# Patient Record
Sex: MALE | Race: WHITE | NOT HISPANIC OR LATINO | Employment: FULL TIME | URBAN - METROPOLITAN AREA
[De-identification: names, ages, dates, MRNs, and addresses within clinical notes are randomized per-mention and may not be internally consistent; named-entity substitution may affect disease eponyms.]

---

## 2021-01-01 ENCOUNTER — VIRTUAL VISIT (OUTPATIENT)
Dept: ONCOLOGY | Facility: CLINIC | Age: 61
End: 2021-01-01
Payer: COMMERCIAL

## 2021-01-01 ENCOUNTER — PATIENT OUTREACH (OUTPATIENT)
Dept: ONCOLOGY | Facility: CLINIC | Age: 61
End: 2021-01-01
Payer: COMMERCIAL

## 2021-01-01 ENCOUNTER — PATIENT OUTREACH (OUTPATIENT)
Dept: ONCOLOGY | Facility: CLINIC | Age: 61
End: 2021-01-01

## 2021-01-01 ENCOUNTER — LAB (OUTPATIENT)
Dept: ONCOLOGY | Facility: CLINIC | Age: 61
End: 2021-01-01
Payer: COMMERCIAL

## 2021-01-01 ENCOUNTER — INFUSION THERAPY VISIT (OUTPATIENT)
Dept: ONCOLOGY | Facility: CLINIC | Age: 61
End: 2021-01-01
Attending: PHYSICIAN ASSISTANT
Payer: COMMERCIAL

## 2021-01-01 ENCOUNTER — HEALTH MAINTENANCE LETTER (OUTPATIENT)
Age: 61
End: 2021-01-01

## 2021-01-01 ENCOUNTER — INFUSION THERAPY VISIT (OUTPATIENT)
Dept: INFUSION THERAPY | Facility: CLINIC | Age: 61
End: 2021-01-01
Payer: COMMERCIAL

## 2021-01-01 ENCOUNTER — APPOINTMENT (OUTPATIENT)
Dept: GENERAL RADIOLOGY | Facility: CLINIC | Age: 61
DRG: 436 | End: 2021-01-01
Attending: PHYSICIAN ASSISTANT
Payer: COMMERCIAL

## 2021-01-01 ENCOUNTER — ONCOLOGY VISIT (OUTPATIENT)
Dept: ONCOLOGY | Facility: CLINIC | Age: 61
End: 2021-01-01
Attending: INTERNAL MEDICINE
Payer: COMMERCIAL

## 2021-01-01 ENCOUNTER — HOME INFUSION (PRE-WILLOW HOME INFUSION) (OUTPATIENT)
Dept: PHARMACY | Facility: CLINIC | Age: 61
End: 2021-01-01

## 2021-01-01 ENCOUNTER — RESEARCH ENCOUNTER (OUTPATIENT)
Dept: ONCOLOGY | Facility: CLINIC | Age: 61
End: 2021-01-01

## 2021-01-01 ENCOUNTER — INFUSION THERAPY VISIT (OUTPATIENT)
Dept: ONCOLOGY | Facility: CLINIC | Age: 61
End: 2021-01-01
Payer: COMMERCIAL

## 2021-01-01 ENCOUNTER — TELEPHONE (OUTPATIENT)
Dept: ONCOLOGY | Facility: CLINIC | Age: 61
End: 2021-01-01

## 2021-01-01 ENCOUNTER — DOCUMENTATION ONLY (OUTPATIENT)
Dept: PHARMACY | Facility: CLINIC | Age: 61
End: 2021-01-01

## 2021-01-01 ENCOUNTER — HOME INFUSION (PRE-WILLOW HOME INFUSION) (OUTPATIENT)
Dept: PHARMACY | Facility: CLINIC | Age: 61
End: 2021-01-01
Payer: COMMERCIAL

## 2021-01-01 ENCOUNTER — MYC REFILL (OUTPATIENT)
Dept: ONCOLOGY | Facility: CLINIC | Age: 61
End: 2021-01-01

## 2021-01-01 ENCOUNTER — ANCILLARY PROCEDURE (OUTPATIENT)
Dept: CT IMAGING | Facility: CLINIC | Age: 61
End: 2021-01-01
Attending: INTERNAL MEDICINE
Payer: COMMERCIAL

## 2021-01-01 ENCOUNTER — ANCILLARY PROCEDURE (OUTPATIENT)
Dept: GENERAL RADIOLOGY | Facility: CLINIC | Age: 61
End: 2021-01-01
Payer: COMMERCIAL

## 2021-01-01 ENCOUNTER — MYC MEDICAL ADVICE (OUTPATIENT)
Dept: ONCOLOGY | Facility: CLINIC | Age: 61
End: 2021-01-01
Payer: COMMERCIAL

## 2021-01-01 ENCOUNTER — DOCUMENTATION ONLY (OUTPATIENT)
Facility: CLINIC | Age: 61
End: 2021-01-01
Payer: COMMERCIAL

## 2021-01-01 ENCOUNTER — APPOINTMENT (OUTPATIENT)
Dept: LAB | Facility: CLINIC | Age: 61
End: 2021-01-01
Attending: INTERNAL MEDICINE
Payer: COMMERCIAL

## 2021-01-01 ENCOUNTER — NURSE TRIAGE (OUTPATIENT)
Dept: NURSING | Facility: CLINIC | Age: 61
End: 2021-01-01

## 2021-01-01 ENCOUNTER — DOCUMENTATION ONLY (OUTPATIENT)
Dept: ONCOLOGY | Facility: CLINIC | Age: 61
End: 2021-01-01

## 2021-01-01 ENCOUNTER — APPOINTMENT (OUTPATIENT)
Dept: LAB | Facility: CLINIC | Age: 61
End: 2021-01-01
Payer: COMMERCIAL

## 2021-01-01 ENCOUNTER — APPOINTMENT (OUTPATIENT)
Dept: LAB | Facility: CLINIC | Age: 61
End: 2021-01-01
Attending: PHYSICIAN ASSISTANT
Payer: COMMERCIAL

## 2021-01-01 ENCOUNTER — PRE VISIT (OUTPATIENT)
Dept: ONCOLOGY | Facility: CLINIC | Age: 61
End: 2021-01-01

## 2021-01-01 ENCOUNTER — MYC MEDICAL ADVICE (OUTPATIENT)
Dept: ONCOLOGY | Facility: CLINIC | Age: 61
End: 2021-01-01

## 2021-01-01 ENCOUNTER — HOSPITAL ENCOUNTER (INPATIENT)
Facility: CLINIC | Age: 61
LOS: 5 days | Discharge: HOME | DRG: 436 | End: 2021-12-04
Attending: STUDENT IN AN ORGANIZED HEALTH CARE EDUCATION/TRAINING PROGRAM | Admitting: STUDENT IN AN ORGANIZED HEALTH CARE EDUCATION/TRAINING PROGRAM
Payer: COMMERCIAL

## 2021-01-01 ENCOUNTER — LAB (OUTPATIENT)
Dept: LAB | Facility: CLINIC | Age: 61
End: 2021-01-01
Payer: COMMERCIAL

## 2021-01-01 ENCOUNTER — PATIENT OUTREACH (OUTPATIENT)
Dept: CARE COORDINATION | Facility: CLINIC | Age: 61
End: 2021-01-01

## 2021-01-01 ENCOUNTER — TRANSCRIBE ORDERS (OUTPATIENT)
Dept: OTHER | Age: 61
End: 2021-01-01

## 2021-01-01 ENCOUNTER — NURSE TRIAGE (OUTPATIENT)
Dept: NURSING | Facility: CLINIC | Age: 61
End: 2021-01-01
Payer: COMMERCIAL

## 2021-01-01 ENCOUNTER — APPOINTMENT (OUTPATIENT)
Dept: CT IMAGING | Facility: CLINIC | Age: 61
DRG: 436 | End: 2021-01-01
Attending: STUDENT IN AN ORGANIZED HEALTH CARE EDUCATION/TRAINING PROGRAM
Payer: COMMERCIAL

## 2021-01-01 ENCOUNTER — NURSE TRIAGE (OUTPATIENT)
Dept: ONCOLOGY | Facility: CLINIC | Age: 61
End: 2021-01-01

## 2021-01-01 ENCOUNTER — HOSPITAL ENCOUNTER (OUTPATIENT)
Facility: CLINIC | Age: 61
Discharge: HOME | End: 2021-06-04
Attending: RADIOLOGY | Admitting: RADIOLOGY
Payer: COMMERCIAL

## 2021-01-01 ENCOUNTER — APPOINTMENT (OUTPATIENT)
Dept: INTERVENTIONAL RADIOLOGY/VASCULAR | Facility: CLINIC | Age: 61
End: 2021-01-01
Attending: INTERNAL MEDICINE
Payer: COMMERCIAL

## 2021-01-01 ENCOUNTER — APPOINTMENT (OUTPATIENT)
Dept: GENERAL RADIOLOGY | Facility: CLINIC | Age: 61
End: 2021-01-01
Payer: COMMERCIAL

## 2021-01-01 ENCOUNTER — HOSPITAL ENCOUNTER (OUTPATIENT)
Dept: PHYSICAL THERAPY | Facility: CLINIC | Age: 61
Setting detail: THERAPIES SERIES
End: 2021-07-15
Attending: INTERNAL MEDICINE
Payer: COMMERCIAL

## 2021-01-01 ENCOUNTER — HOSPITAL ENCOUNTER (OUTPATIENT)
Facility: CLINIC | Age: 61
Discharge: HOME | End: 2021-06-14
Payer: COMMERCIAL

## 2021-01-01 ENCOUNTER — HOSPITAL ENCOUNTER (OUTPATIENT)
Facility: AMBULATORY SURGERY CENTER | Age: 61
End: 2021-01-01
Attending: RADIOLOGY
Payer: COMMERCIAL

## 2021-01-01 VITALS
WEIGHT: 187 LBS | OXYGEN SATURATION: 97 % | BODY MASS INDEX: 25.36 KG/M2 | SYSTOLIC BLOOD PRESSURE: 108 MMHG | RESPIRATION RATE: 18 BRPM | HEART RATE: 77 BPM | DIASTOLIC BLOOD PRESSURE: 70 MMHG | TEMPERATURE: 98.4 F

## 2021-01-01 VITALS
TEMPERATURE: 97.6 F | RESPIRATION RATE: 16 BRPM | SYSTOLIC BLOOD PRESSURE: 116 MMHG | DIASTOLIC BLOOD PRESSURE: 80 MMHG | HEART RATE: 57 BPM | OXYGEN SATURATION: 99 %

## 2021-01-01 VITALS
DIASTOLIC BLOOD PRESSURE: 74 MMHG | OXYGEN SATURATION: 96 % | TEMPERATURE: 98.2 F | WEIGHT: 195.8 LBS | RESPIRATION RATE: 16 BRPM | BODY MASS INDEX: 25.83 KG/M2 | SYSTOLIC BLOOD PRESSURE: 114 MMHG | HEART RATE: 65 BPM

## 2021-01-01 VITALS
SYSTOLIC BLOOD PRESSURE: 111 MMHG | WEIGHT: 199.5 LBS | HEART RATE: 60 BPM | OXYGEN SATURATION: 95 % | DIASTOLIC BLOOD PRESSURE: 74 MMHG | BODY MASS INDEX: 27.06 KG/M2 | TEMPERATURE: 97.4 F | RESPIRATION RATE: 17 BRPM

## 2021-01-01 VITALS
SYSTOLIC BLOOD PRESSURE: 109 MMHG | DIASTOLIC BLOOD PRESSURE: 72 MMHG | HEART RATE: 98 BPM | OXYGEN SATURATION: 95 % | TEMPERATURE: 97.8 F | WEIGHT: 179 LBS | RESPIRATION RATE: 16 BRPM | BODY MASS INDEX: 24.28 KG/M2

## 2021-01-01 VITALS
WEIGHT: 166.89 LBS | RESPIRATION RATE: 16 BRPM | BODY MASS INDEX: 22.63 KG/M2 | SYSTOLIC BLOOD PRESSURE: 101 MMHG | TEMPERATURE: 97.4 F | OXYGEN SATURATION: 94 % | DIASTOLIC BLOOD PRESSURE: 59 MMHG | HEART RATE: 93 BPM

## 2021-01-01 VITALS
TEMPERATURE: 97.4 F | RESPIRATION RATE: 16 BRPM | DIASTOLIC BLOOD PRESSURE: 67 MMHG | HEART RATE: 88 BPM | WEIGHT: 189 LBS | SYSTOLIC BLOOD PRESSURE: 102 MMHG | OXYGEN SATURATION: 96 % | BODY MASS INDEX: 25.63 KG/M2

## 2021-01-01 VITALS
TEMPERATURE: 97.5 F | WEIGHT: 190.7 LBS | RESPIRATION RATE: 18 BRPM | SYSTOLIC BLOOD PRESSURE: 111 MMHG | OXYGEN SATURATION: 98 % | HEART RATE: 78 BPM | BODY MASS INDEX: 25.86 KG/M2 | DIASTOLIC BLOOD PRESSURE: 68 MMHG

## 2021-01-01 VITALS
OXYGEN SATURATION: 95 % | RESPIRATION RATE: 18 BRPM | DIASTOLIC BLOOD PRESSURE: 78 MMHG | SYSTOLIC BLOOD PRESSURE: 125 MMHG | HEART RATE: 69 BPM | BODY MASS INDEX: 26.04 KG/M2 | TEMPERATURE: 98 F | WEIGHT: 197.4 LBS

## 2021-01-01 VITALS
DIASTOLIC BLOOD PRESSURE: 71 MMHG | TEMPERATURE: 98 F | OXYGEN SATURATION: 98 % | HEART RATE: 75 BPM | WEIGHT: 197.2 LBS | SYSTOLIC BLOOD PRESSURE: 115 MMHG | HEIGHT: 73 IN | RESPIRATION RATE: 16 BRPM | BODY MASS INDEX: 26.14 KG/M2

## 2021-01-01 VITALS
HEART RATE: 81 BPM | DIASTOLIC BLOOD PRESSURE: 68 MMHG | TEMPERATURE: 98.2 F | SYSTOLIC BLOOD PRESSURE: 102 MMHG | WEIGHT: 188.4 LBS | OXYGEN SATURATION: 97 % | BODY MASS INDEX: 25.55 KG/M2

## 2021-01-01 VITALS
DIASTOLIC BLOOD PRESSURE: 80 MMHG | TEMPERATURE: 98.2 F | SYSTOLIC BLOOD PRESSURE: 123 MMHG | WEIGHT: 189.1 LBS | BODY MASS INDEX: 24.95 KG/M2 | HEART RATE: 82 BPM | OXYGEN SATURATION: 97 %

## 2021-01-01 VITALS — DIASTOLIC BLOOD PRESSURE: 79 MMHG | OXYGEN SATURATION: 100 % | SYSTOLIC BLOOD PRESSURE: 121 MMHG | HEART RATE: 70 BPM

## 2021-01-01 VITALS
RESPIRATION RATE: 16 BRPM | TEMPERATURE: 98.1 F | SYSTOLIC BLOOD PRESSURE: 98 MMHG | OXYGEN SATURATION: 99 % | HEART RATE: 73 BPM | DIASTOLIC BLOOD PRESSURE: 65 MMHG

## 2021-01-01 VITALS
BODY MASS INDEX: 24.79 KG/M2 | OXYGEN SATURATION: 96 % | TEMPERATURE: 97.5 F | DIASTOLIC BLOOD PRESSURE: 74 MMHG | RESPIRATION RATE: 16 BRPM | HEIGHT: 72 IN | SYSTOLIC BLOOD PRESSURE: 110 MMHG | HEART RATE: 94 BPM | WEIGHT: 183 LBS

## 2021-01-01 VITALS
SYSTOLIC BLOOD PRESSURE: 106 MMHG | HEIGHT: 72 IN | HEART RATE: 70 BPM | OXYGEN SATURATION: 97 % | RESPIRATION RATE: 16 BRPM | BODY MASS INDEX: 25.73 KG/M2 | WEIGHT: 190 LBS | DIASTOLIC BLOOD PRESSURE: 74 MMHG | TEMPERATURE: 98 F

## 2021-01-01 VITALS
BODY MASS INDEX: 25.79 KG/M2 | HEART RATE: 71 BPM | OXYGEN SATURATION: 97 % | DIASTOLIC BLOOD PRESSURE: 77 MMHG | WEIGHT: 195.5 LBS | RESPIRATION RATE: 16 BRPM | SYSTOLIC BLOOD PRESSURE: 119 MMHG | TEMPERATURE: 98 F

## 2021-01-01 VITALS
SYSTOLIC BLOOD PRESSURE: 110 MMHG | WEIGHT: 187.6 LBS | HEART RATE: 74 BPM | DIASTOLIC BLOOD PRESSURE: 72 MMHG | RESPIRATION RATE: 12 BRPM | TEMPERATURE: 98 F | OXYGEN SATURATION: 98 % | BODY MASS INDEX: 25.44 KG/M2

## 2021-01-01 VITALS — BODY MASS INDEX: 27.09 KG/M2 | HEIGHT: 72 IN | WEIGHT: 200 LBS

## 2021-01-01 VITALS
TEMPERATURE: 97.7 F | BODY MASS INDEX: 26.1 KG/M2 | HEART RATE: 69 BPM | SYSTOLIC BLOOD PRESSURE: 131 MMHG | OXYGEN SATURATION: 99 % | DIASTOLIC BLOOD PRESSURE: 78 MMHG | WEIGHT: 197.8 LBS

## 2021-01-01 DIAGNOSIS — R11.2 NAUSEA AND VOMITING, INTRACTABILITY OF VOMITING NOT SPECIFIED, UNSPECIFIED VOMITING TYPE: ICD-10-CM

## 2021-01-01 DIAGNOSIS — C78.7 LIVER METASTASES: ICD-10-CM

## 2021-01-01 DIAGNOSIS — C25.2 MALIGNANT NEOPLASM OF TAIL OF PANCREAS (H): Primary | ICD-10-CM

## 2021-01-01 DIAGNOSIS — E83.52 HYPERCALCEMIA: ICD-10-CM

## 2021-01-01 DIAGNOSIS — T45.1X5A MOUTH SORE SECONDARY TO CHEMOTHERAPY: Primary | ICD-10-CM

## 2021-01-01 DIAGNOSIS — C79.9 METASTASIS FROM PANCREATIC CANCER (H): Primary | ICD-10-CM

## 2021-01-01 DIAGNOSIS — K13.79 MOUTH SORE SECONDARY TO CHEMOTHERAPY: Primary | ICD-10-CM

## 2021-01-01 DIAGNOSIS — C25.2 MALIGNANT NEOPLASM OF TAIL OF PANCREAS (H): ICD-10-CM

## 2021-01-01 DIAGNOSIS — T45.1X5A CHEMOTHERAPY-INDUCED NEUTROPENIA (H): ICD-10-CM

## 2021-01-01 DIAGNOSIS — C25.9 PANCREATIC CANCER METASTASIZED TO LIVER (H): Primary | ICD-10-CM

## 2021-01-01 DIAGNOSIS — C25.9 PRIMARY MALIGNANT NEOPLASM OF PANCREAS WITH METASTASIS TO OTHER SITE (H): ICD-10-CM

## 2021-01-01 DIAGNOSIS — C78.7 PANCREATIC CANCER METASTASIZED TO LIVER (H): Primary | ICD-10-CM

## 2021-01-01 DIAGNOSIS — Z11.59 ENCOUNTER FOR SCREENING FOR OTHER VIRAL DISEASES: ICD-10-CM

## 2021-01-01 DIAGNOSIS — R65.10 SIRS (SYSTEMIC INFLAMMATORY RESPONSE SYNDROME) (H): ICD-10-CM

## 2021-01-01 DIAGNOSIS — C78.7 PANCREATIC CANCER METASTASIZED TO LIVER (H): ICD-10-CM

## 2021-01-01 DIAGNOSIS — R19.7 DIARRHEA, UNSPECIFIED TYPE: ICD-10-CM

## 2021-01-01 DIAGNOSIS — C25.9 PANCREATIC CANCER (H): Primary | ICD-10-CM

## 2021-01-01 DIAGNOSIS — C78.7 LIVER METASTASES: Primary | ICD-10-CM

## 2021-01-01 DIAGNOSIS — Z20.822 ENCOUNTER FOR LABORATORY TESTING FOR COVID-19 VIRUS: ICD-10-CM

## 2021-01-01 DIAGNOSIS — R05.9 COUGH: Primary | ICD-10-CM

## 2021-01-01 DIAGNOSIS — T45.1X5A CHEMOTHERAPY-INDUCED NEUTROPENIA (H): Primary | ICD-10-CM

## 2021-01-01 DIAGNOSIS — D64.9 SYMPTOMATIC ANEMIA: ICD-10-CM

## 2021-01-01 DIAGNOSIS — D64.9 SYMPTOMATIC ANEMIA: Primary | ICD-10-CM

## 2021-01-01 DIAGNOSIS — D70.1 CHEMOTHERAPY-INDUCED NEUTROPENIA (H): Primary | ICD-10-CM

## 2021-01-01 DIAGNOSIS — D70.1 CHEMOTHERAPY-INDUCED NEUTROPENIA (H): ICD-10-CM

## 2021-01-01 DIAGNOSIS — K92.0 HEMATEMESIS WITH NAUSEA: ICD-10-CM

## 2021-01-01 DIAGNOSIS — K92.2 ACUTE GI BLEEDING: ICD-10-CM

## 2021-01-01 DIAGNOSIS — C25.9 PANCREATIC CANCER METASTASIZED TO LIVER (H): ICD-10-CM

## 2021-01-01 DIAGNOSIS — C25.9 METASTASIS FROM PANCREATIC CANCER (H): Primary | ICD-10-CM

## 2021-01-01 DIAGNOSIS — C25.9 PRIMARY MALIGNANT NEOPLASM OF PANCREAS WITH METASTASIS TO OTHER SITE (H): Primary | ICD-10-CM

## 2021-01-01 DIAGNOSIS — Z20.822 LAB TEST NEGATIVE FOR COVID-19 VIRUS: ICD-10-CM

## 2021-01-01 DIAGNOSIS — G47.09 OTHER INSOMNIA: ICD-10-CM

## 2021-01-01 DIAGNOSIS — K92.1 MELENA: ICD-10-CM

## 2021-01-01 LAB
ABO/RH(D): NORMAL
ABO/RH(D): NORMAL
ALBUMIN SERPL-MCNC: 2 G/DL (ref 3.4–5)
ALBUMIN SERPL-MCNC: 2 G/DL (ref 3.4–5)
ALBUMIN SERPL-MCNC: 2.2 G/DL (ref 3.4–5)
ALBUMIN SERPL-MCNC: 2.8 G/DL (ref 3.4–5)
ALBUMIN SERPL-MCNC: 2.8 G/DL (ref 3.4–5)
ALBUMIN SERPL-MCNC: 3 G/DL (ref 3.4–5)
ALBUMIN SERPL-MCNC: 3.1 G/DL (ref 3.4–5)
ALBUMIN SERPL-MCNC: 3.2 G/DL (ref 3.4–5)
ALBUMIN SERPL-MCNC: 3.3 G/DL (ref 3.4–5)
ALBUMIN SERPL-MCNC: 3.4 G/DL (ref 3.4–5)
ALBUMIN UR-MCNC: NEGATIVE MG/DL
ALBUMIN UR-MCNC: NEGATIVE MG/DL
ALP SERPL-CCNC: 100 U/L (ref 40–150)
ALP SERPL-CCNC: 104 U/L (ref 40–150)
ALP SERPL-CCNC: 122 U/L (ref 40–150)
ALP SERPL-CCNC: 132 U/L (ref 40–150)
ALP SERPL-CCNC: 277 U/L (ref 40–150)
ALP SERPL-CCNC: 71 U/L (ref 40–150)
ALP SERPL-CCNC: 80 U/L (ref 40–150)
ALP SERPL-CCNC: 85 U/L (ref 40–150)
ALP SERPL-CCNC: 90 U/L (ref 40–150)
ALP SERPL-CCNC: 92 U/L (ref 40–150)
ALP SERPL-CCNC: 93 U/L (ref 40–150)
ALP SERPL-CCNC: 95 U/L (ref 40–150)
ALT SERPL W P-5'-P-CCNC: 14 U/L (ref 0–70)
ALT SERPL W P-5'-P-CCNC: 18 U/L (ref 0–70)
ALT SERPL W P-5'-P-CCNC: 23 U/L (ref 0–70)
ALT SERPL W P-5'-P-CCNC: 26 U/L (ref 0–70)
ALT SERPL W P-5'-P-CCNC: 26 U/L (ref 0–70)
ALT SERPL W P-5'-P-CCNC: 27 U/L (ref 0–70)
ALT SERPL W P-5'-P-CCNC: 28 U/L (ref 0–70)
ALT SERPL W P-5'-P-CCNC: 29 U/L (ref 0–70)
ALT SERPL W P-5'-P-CCNC: 31 U/L (ref 0–70)
ALT SERPL W P-5'-P-CCNC: 31 U/L (ref 0–70)
ALT SERPL W P-5'-P-CCNC: 32 U/L (ref 0–70)
ALT SERPL W P-5'-P-CCNC: 33 U/L (ref 0–70)
ALT SERPL W P-5'-P-CCNC: 33 U/L (ref 0–70)
AMORPH CRY #/AREA URNS HPF: ABNORMAL /HPF
ANION GAP SERPL CALCULATED.3IONS-SCNC: 2 MMOL/L (ref 3–14)
ANION GAP SERPL CALCULATED.3IONS-SCNC: 3 MMOL/L (ref 3–14)
ANION GAP SERPL CALCULATED.3IONS-SCNC: 4 MMOL/L (ref 3–14)
ANION GAP SERPL CALCULATED.3IONS-SCNC: 5 MMOL/L (ref 3–14)
ANION GAP SERPL CALCULATED.3IONS-SCNC: 6 MMOL/L (ref 3–14)
ANION GAP SERPL CALCULATED.3IONS-SCNC: 6 MMOL/L (ref 3–14)
ANION GAP SERPL CALCULATED.3IONS-SCNC: 7 MMOL/L (ref 3–14)
ANION GAP SERPL CALCULATED.3IONS-SCNC: 8 MMOL/L (ref 3–14)
ANTIBODY SCREEN: NEGATIVE
ANTIBODY SCREEN: NEGATIVE
APPEARANCE UR: CLEAR
APPEARANCE UR: CLEAR
APTT PPP: 24 SECONDS (ref 22–38)
AST SERPL W P-5'-P-CCNC: 17 U/L (ref 0–45)
AST SERPL W P-5'-P-CCNC: 19 U/L (ref 0–45)
AST SERPL W P-5'-P-CCNC: 21 U/L (ref 0–45)
AST SERPL W P-5'-P-CCNC: 23 U/L (ref 0–45)
AST SERPL W P-5'-P-CCNC: 24 U/L (ref 0–45)
AST SERPL W P-5'-P-CCNC: 24 U/L (ref 0–45)
AST SERPL W P-5'-P-CCNC: 25 U/L (ref 0–45)
AST SERPL W P-5'-P-CCNC: 26 U/L (ref 0–45)
AST SERPL W P-5'-P-CCNC: 26 U/L (ref 0–45)
AST SERPL W P-5'-P-CCNC: 27 U/L (ref 0–45)
AST SERPL W P-5'-P-CCNC: 27 U/L (ref 0–45)
AST SERPL W P-5'-P-CCNC: 29 U/L (ref 0–45)
AST SERPL W P-5'-P-CCNC: 37 U/L (ref 0–45)
ATRIAL RATE - MUSE: 83 BPM
BACTERIA BLD CULT: NO GROWTH
BASOPHILS # BLD AUTO: 0 10E3/UL (ref 0–0.2)
BASOPHILS # BLD AUTO: 0 10E9/L (ref 0–0.2)
BASOPHILS # BLD AUTO: 0.1 10E3/UL (ref 0–0.2)
BASOPHILS # BLD MANUAL: 0 10E3/UL (ref 0–0.2)
BASOPHILS NFR BLD AUTO: 0 %
BASOPHILS NFR BLD AUTO: 0.3 %
BASOPHILS NFR BLD AUTO: 0.3 %
BASOPHILS NFR BLD AUTO: 0.7 %
BASOPHILS NFR BLD AUTO: 1 %
BASOPHILS NFR BLD MANUAL: 0 %
BILIRUB SERPL-MCNC: 0.2 MG/DL (ref 0.2–1.3)
BILIRUB SERPL-MCNC: 0.3 MG/DL (ref 0.2–1.3)
BILIRUB SERPL-MCNC: 0.3 MG/DL (ref 0.2–1.3)
BILIRUB SERPL-MCNC: 0.4 MG/DL (ref 0.2–1.3)
BILIRUB SERPL-MCNC: 0.5 MG/DL (ref 0.2–1.3)
BILIRUB SERPL-MCNC: 0.5 MG/DL (ref 0.2–1.3)
BILIRUB SERPL-MCNC: 0.6 MG/DL (ref 0.2–1.3)
BILIRUB SERPL-MCNC: 0.6 MG/DL (ref 0.2–1.3)
BILIRUB SERPL-MCNC: 0.7 MG/DL (ref 0.2–1.3)
BILIRUB SERPL-MCNC: 0.7 MG/DL (ref 0.2–1.3)
BILIRUB UR QL STRIP: NEGATIVE
BILIRUB UR QL STRIP: NEGATIVE
BKR LAB AP TR RXN INTERPRETATION: NORMAL
BKR LAB AP TR RXN INTERPRETATION: NORMAL
BKR LAB AP TRAN RXN RECOMMENDATION: NORMAL
BKR LAB AP TRAN RXN RECOMMENDATION: NORMAL
BKR LAB AP TRANS SIGNS AND SX: NORMAL
BKR LAB AP TRANS SIGNS AND SX: NORMAL
BKR LAB AP TRANSFUSION REACTION: NORMAL
BKR LAB AP TRANSFUSION REACTION: NORMAL
BLD PROD TYP BPU: NORMAL
BLOOD COMPONENT TYPE: NORMAL
BUN SERPL-MCNC: 11 MG/DL (ref 7–30)
BUN SERPL-MCNC: 12 MG/DL (ref 7–30)
BUN SERPL-MCNC: 14 MG/DL (ref 7–30)
BUN SERPL-MCNC: 15 MG/DL (ref 7–30)
BUN SERPL-MCNC: 15 MG/DL (ref 7–30)
BUN SERPL-MCNC: 17 MG/DL (ref 7–30)
BUN SERPL-MCNC: 17 MG/DL (ref 7–30)
BUN SERPL-MCNC: 18 MG/DL (ref 7–30)
BUN SERPL-MCNC: 19 MG/DL (ref 7–30)
BUN SERPL-MCNC: 20 MG/DL (ref 7–30)
BUN SERPL-MCNC: 21 MG/DL (ref 7–30)
BUN SERPL-MCNC: 23 MG/DL (ref 7–30)
BUN SERPL-MCNC: 30 MG/DL (ref 7–30)
CALCIUM SERPL-MCNC: 10.4 MG/DL (ref 8.5–10.1)
CALCIUM SERPL-MCNC: 10.6 MG/DL (ref 8.5–10.1)
CALCIUM SERPL-MCNC: 11 MG/DL (ref 8.5–10.1)
CALCIUM SERPL-MCNC: 11 MG/DL (ref 8.5–10.1)
CALCIUM SERPL-MCNC: 11.4 MG/DL (ref 8.5–10.1)
CALCIUM SERPL-MCNC: 11.6 MG/DL (ref 8.5–10.1)
CALCIUM SERPL-MCNC: 12.7 MG/DL (ref 8.5–10.1)
CALCIUM SERPL-MCNC: 8.1 MG/DL (ref 8.5–10.1)
CALCIUM SERPL-MCNC: 8.3 MG/DL (ref 8.5–10.1)
CALCIUM SERPL-MCNC: 8.6 MG/DL (ref 8.5–10.1)
CALCIUM SERPL-MCNC: 8.7 MG/DL (ref 8.5–10.1)
CALCIUM SERPL-MCNC: 8.8 MG/DL (ref 8.5–10.1)
CALCIUM SERPL-MCNC: 8.9 MG/DL (ref 8.5–10.1)
CALCIUM SERPL-MCNC: 9.1 MG/DL (ref 8.5–10.1)
CANCER AG19-9 SERPL IA-ACNC: 240 U/ML
CANCER AG19-9 SERPL IA-ACNC: 242 U/ML
CANCER AG19-9 SERPL IA-ACNC: 252 U/ML
CANCER AG19-9 SERPL IA-ACNC: 283 U/ML
CANCER AG19-9 SERPL IA-ACNC: 457 U/ML
CANCER AG19-9 SERPL IA-ACNC: 569 U/ML
CANCER AG19-9 SERPL-ACNC: 1153 U/ML (ref 0–37)
CANCER AG19-9 SERPL-ACNC: 1186 U/ML (ref 0–37)
CANCER AG19-9 SERPL-ACNC: 661 U/ML (ref 0–37)
CHLORIDE BLD-SCNC: 100 MMOL/L (ref 94–109)
CHLORIDE BLD-SCNC: 101 MMOL/L (ref 94–109)
CHLORIDE BLD-SCNC: 102 MMOL/L (ref 94–109)
CHLORIDE BLD-SCNC: 103 MMOL/L (ref 94–109)
CHLORIDE BLD-SCNC: 105 MMOL/L (ref 94–109)
CHLORIDE BLD-SCNC: 105 MMOL/L (ref 94–109)
CHLORIDE BLD-SCNC: 108 MMOL/L (ref 94–109)
CHLORIDE BLD-SCNC: 109 MMOL/L (ref 94–109)
CHLORIDE BLD-SCNC: 109 MMOL/L (ref 94–109)
CHLORIDE BLD-SCNC: 110 MMOL/L (ref 94–109)
CHLORIDE BLD-SCNC: 99 MMOL/L (ref 94–109)
CHLORIDE SERPL-SCNC: 103 MMOL/L (ref 94–109)
CHLORIDE SERPL-SCNC: 106 MMOL/L (ref 94–109)
CHLORIDE SERPL-SCNC: 109 MMOL/L (ref 94–109)
CO COMPONENTS: NORMAL
CO COMPONENTS: NORMAL
CO2 SERPL-SCNC: 27 MMOL/L (ref 20–32)
CO2 SERPL-SCNC: 27 MMOL/L (ref 20–32)
CO2 SERPL-SCNC: 28 MMOL/L (ref 20–32)
CO2 SERPL-SCNC: 29 MMOL/L (ref 20–32)
CO2 SERPL-SCNC: 30 MMOL/L (ref 20–32)
CODING SYSTEM: NORMAL
COLOR UR AUTO: YELLOW
COLOR UR AUTO: YELLOW
COMMENTS: NORMAL
COMMENTS: NORMAL
COPATH REPORT: NORMAL
CREAT BLD-MCNC: 0.8 MG/DL (ref 0.66–1.25)
CREAT SERPL-MCNC: 0.69 MG/DL (ref 0.66–1.25)
CREAT SERPL-MCNC: 0.69 MG/DL (ref 0.66–1.25)
CREAT SERPL-MCNC: 0.72 MG/DL (ref 0.66–1.25)
CREAT SERPL-MCNC: 0.75 MG/DL (ref 0.66–1.25)
CREAT SERPL-MCNC: 0.78 MG/DL (ref 0.66–1.25)
CREAT SERPL-MCNC: 0.79 MG/DL (ref 0.66–1.25)
CREAT SERPL-MCNC: 0.8 MG/DL (ref 0.66–1.25)
CREAT SERPL-MCNC: 0.8 MG/DL (ref 0.66–1.25)
CREAT SERPL-MCNC: 0.82 MG/DL (ref 0.66–1.25)
CREAT SERPL-MCNC: 0.84 MG/DL (ref 0.66–1.25)
CREAT SERPL-MCNC: 0.84 MG/DL (ref 0.66–1.25)
CREAT SERPL-MCNC: 0.85 MG/DL (ref 0.66–1.25)
CREAT SERPL-MCNC: 0.88 MG/DL (ref 0.66–1.25)
CREAT SERPL-MCNC: 0.9 MG/DL (ref 0.66–1.25)
CREAT SERPL-MCNC: 0.91 MG/DL (ref 0.66–1.25)
CREAT SERPL-MCNC: 0.92 MG/DL (ref 0.66–1.25)
CROSSMATCH: NORMAL
DIASTOLIC BLOOD PRESSURE - MUSE: NORMAL MMHG
DIFFERENTIAL METHOD BLD: ABNORMAL
ELLIPTOCYTES BLD QL SMEAR: SLIGHT
EOSINOPHIL # BLD AUTO: 0 10E3/UL (ref 0–0.7)
EOSINOPHIL # BLD AUTO: 0 10E9/L (ref 0–0.7)
EOSINOPHIL # BLD AUTO: 0.1 10E3/UL (ref 0–0.7)
EOSINOPHIL # BLD AUTO: 0.1 10E9/L (ref 0–0.7)
EOSINOPHIL # BLD AUTO: 0.1 10E9/L (ref 0–0.7)
EOSINOPHIL # BLD AUTO: 0.2 10E3/UL (ref 0–0.7)
EOSINOPHIL # BLD MANUAL: 0 10E3/UL (ref 0–0.7)
EOSINOPHIL NFR BLD AUTO: 0 %
EOSINOPHIL NFR BLD AUTO: 1 %
EOSINOPHIL NFR BLD AUTO: 1.4 %
EOSINOPHIL NFR BLD AUTO: 1.8 %
EOSINOPHIL NFR BLD AUTO: 2 %
EOSINOPHIL NFR BLD AUTO: 2 %
EOSINOPHIL NFR BLD AUTO: 3 %
EOSINOPHIL NFR BLD MANUAL: 0 %
ERYTHROCYTE [DISTWIDTH] IN BLOOD BY AUTOMATED COUNT: 12.5 % (ref 10–15)
ERYTHROCYTE [DISTWIDTH] IN BLOOD BY AUTOMATED COUNT: 12.6 % (ref 10–15)
ERYTHROCYTE [DISTWIDTH] IN BLOOD BY AUTOMATED COUNT: 12.7 % (ref 10–15)
ERYTHROCYTE [DISTWIDTH] IN BLOOD BY AUTOMATED COUNT: 13.4 % (ref 10–15)
ERYTHROCYTE [DISTWIDTH] IN BLOOD BY AUTOMATED COUNT: 14.2 % (ref 10–15)
ERYTHROCYTE [DISTWIDTH] IN BLOOD BY AUTOMATED COUNT: 15.2 % (ref 10–15)
ERYTHROCYTE [DISTWIDTH] IN BLOOD BY AUTOMATED COUNT: 15.6 % (ref 10–15)
ERYTHROCYTE [DISTWIDTH] IN BLOOD BY AUTOMATED COUNT: 15.7 % (ref 10–15)
ERYTHROCYTE [DISTWIDTH] IN BLOOD BY AUTOMATED COUNT: 15.8 % (ref 10–15)
ERYTHROCYTE [DISTWIDTH] IN BLOOD BY AUTOMATED COUNT: 15.9 % (ref 10–15)
ERYTHROCYTE [DISTWIDTH] IN BLOOD BY AUTOMATED COUNT: 16.1 % (ref 10–15)
ERYTHROCYTE [DISTWIDTH] IN BLOOD BY AUTOMATED COUNT: 16.2 % (ref 10–15)
ERYTHROCYTE [DISTWIDTH] IN BLOOD BY AUTOMATED COUNT: 16.4 % (ref 10–15)
ERYTHROCYTE [DISTWIDTH] IN BLOOD BY AUTOMATED COUNT: 16.8 % (ref 10–15)
FLUAV RNA SPEC QL NAA+PROBE: NEGATIVE
FLUBV RNA RESP QL NAA+PROBE: NEGATIVE
GFR SERPL CREATININE-BSD FRML MDRD: 90 ML/MIN/1.73M2
GFR SERPL CREATININE-BSD FRML MDRD: >90 ML/MIN/1.73M2
GFR SERPL CREATININE-BSD FRML MDRD: >90 ML/MIN/{1.73_M2}
GLUCOSE BLD-MCNC: 106 MG/DL (ref 70–99)
GLUCOSE BLD-MCNC: 108 MG/DL (ref 70–99)
GLUCOSE BLD-MCNC: 108 MG/DL (ref 70–99)
GLUCOSE BLD-MCNC: 109 MG/DL (ref 70–99)
GLUCOSE BLD-MCNC: 111 MG/DL (ref 70–99)
GLUCOSE BLD-MCNC: 118 MG/DL (ref 70–99)
GLUCOSE BLD-MCNC: 134 MG/DL (ref 70–99)
GLUCOSE BLD-MCNC: 169 MG/DL (ref 70–99)
GLUCOSE BLD-MCNC: 88 MG/DL (ref 70–99)
GLUCOSE BLD-MCNC: 88 MG/DL (ref 70–99)
GLUCOSE BLD-MCNC: 91 MG/DL (ref 70–99)
GLUCOSE BLD-MCNC: 93 MG/DL (ref 70–99)
GLUCOSE BLD-MCNC: 96 MG/DL (ref 70–99)
GLUCOSE SERPL-MCNC: 106 MG/DL (ref 70–99)
GLUCOSE SERPL-MCNC: 114 MG/DL (ref 70–99)
GLUCOSE SERPL-MCNC: 86 MG/DL (ref 70–99)
GLUCOSE UR STRIP-MCNC: NEGATIVE MG/DL
GLUCOSE UR STRIP-MCNC: NEGATIVE MG/DL
GRAM STAIN RESULT: NORMAL
GRAM STAIN RESULT: NORMAL
GRAM/CULTURE INDICATED?: YES
GRAM/CULTURE INDICATED?: YES
HCT VFR BLD AUTO: 20.2 % (ref 40–53)
HCT VFR BLD AUTO: 22.2 % (ref 40–53)
HCT VFR BLD AUTO: 22.4 % (ref 40–53)
HCT VFR BLD AUTO: 23.8 % (ref 40–53)
HCT VFR BLD AUTO: 24 % (ref 40–53)
HCT VFR BLD AUTO: 24.7 % (ref 40–53)
HCT VFR BLD AUTO: 24.7 % (ref 40–53)
HCT VFR BLD AUTO: 25.7 % (ref 40–53)
HCT VFR BLD AUTO: 25.7 % (ref 40–53)
HCT VFR BLD AUTO: 28.6 % (ref 40–53)
HCT VFR BLD AUTO: 30.8 % (ref 40–53)
HCT VFR BLD AUTO: 31.1 % (ref 40–53)
HCT VFR BLD AUTO: 32.8 % (ref 40–53)
HCT VFR BLD AUTO: 33.2 % (ref 40–53)
HCT VFR BLD AUTO: 33.8 % (ref 40–53)
HCT VFR BLD AUTO: 34.7 % (ref 40–53)
HCT VFR BLD AUTO: 36.1 % (ref 40–53)
HCT VFR BLD AUTO: 36.4 % (ref 40–53)
HCT VFR BLD AUTO: 36.7 % (ref 40–53)
HCT VFR BLD AUTO: 37.5 % (ref 40–53)
HCT VFR BLD AUTO: 37.6 % (ref 40–53)
HCT VFR BLD AUTO: 37.9 % (ref 40–53)
HCT VFR BLD AUTO: 41.3 % (ref 40–53)
HCT VFR BLD AUTO: 41.9 % (ref 40–53)
HGB BLD-MCNC: 10.4 G/DL (ref 13.3–17.7)
HGB BLD-MCNC: 10.5 G/DL (ref 13.3–17.7)
HGB BLD-MCNC: 11 G/DL (ref 13.3–17.7)
HGB BLD-MCNC: 11.2 G/DL (ref 13.3–17.7)
HGB BLD-MCNC: 11.2 G/DL (ref 13.3–17.7)
HGB BLD-MCNC: 11.4 G/DL (ref 13.3–17.7)
HGB BLD-MCNC: 11.6 G/DL (ref 13.3–17.7)
HGB BLD-MCNC: 11.9 G/DL (ref 13.3–17.7)
HGB BLD-MCNC: 12 G/DL (ref 13.3–17.7)
HGB BLD-MCNC: 12.3 G/DL (ref 13.3–17.7)
HGB BLD-MCNC: 12.7 G/DL (ref 13.3–17.7)
HGB BLD-MCNC: 12.9 G/DL (ref 13.3–17.7)
HGB BLD-MCNC: 14 G/DL (ref 13.3–17.7)
HGB BLD-MCNC: 14.1 G/DL (ref 13.3–17.7)
HGB BLD-MCNC: 6 G/DL (ref 13.3–17.7)
HGB BLD-MCNC: 6.7 G/DL (ref 13.3–17.7)
HGB BLD-MCNC: 6.9 G/DL (ref 13.3–17.7)
HGB BLD-MCNC: 7.1 G/DL (ref 13.3–17.7)
HGB BLD-MCNC: 7.2 G/DL (ref 13.3–17.7)
HGB BLD-MCNC: 7.2 G/DL (ref 13.3–17.7)
HGB BLD-MCNC: 7.5 G/DL (ref 13.3–17.7)
HGB BLD-MCNC: 7.5 G/DL (ref 13.3–17.7)
HGB BLD-MCNC: 7.7 G/DL (ref 13.3–17.7)
HGB BLD-MCNC: 7.8 G/DL (ref 13.3–17.7)
HGB BLD-MCNC: 7.8 G/DL (ref 13.3–17.7)
HGB BLD-MCNC: 8.2 G/DL (ref 13.3–17.7)
HGB BLD-MCNC: 8.6 G/DL (ref 13.3–17.7)
HGB UR QL STRIP: NEGATIVE
HGB UR QL STRIP: NEGATIVE
HOLD SPECIMEN: NORMAL
IMM GRANULOCYTES # BLD: 0 10E3/UL
IMM GRANULOCYTES # BLD: 0 10E9/L (ref 0–0.4)
IMM GRANULOCYTES # BLD: 0.1 10E3/UL
IMM GRANULOCYTES # BLD: 0.2 10E3/UL
IMM GRANULOCYTES # BLD: 0.6 10E3/UL
IMM GRANULOCYTES NFR BLD: 0 %
IMM GRANULOCYTES NFR BLD: 0 %
IMM GRANULOCYTES NFR BLD: 0.3 %
IMM GRANULOCYTES NFR BLD: 0.5 %
IMM GRANULOCYTES NFR BLD: 0.6 %
IMM GRANULOCYTES NFR BLD: 1 %
IMM GRANULOCYTES NFR BLD: 2 %
IMM GRANULOCYTES NFR BLD: 4 %
INR PPP: 1.03 (ref 0.86–1.14)
INR PPP: 1.23 (ref 0.85–1.15)
INR PPP: 1.26 (ref 0.85–1.15)
INTERPRETATION ECG - MUSE: NORMAL
ISSUE DATE AND TIME: NORMAL
KETONES UR STRIP-MCNC: 10 MG/DL
KETONES UR STRIP-MCNC: NEGATIVE MG/DL
LAB SCANNED RESULT: NORMAL
LABORATORY COMMENT REPORT: NORMAL
LACTATE SERPL-SCNC: 0.9 MMOL/L (ref 0.7–2)
LACTATE SERPL-SCNC: 1 MMOL/L (ref 0.7–2)
LACTATE SERPL-SCNC: 1.2 MMOL/L (ref 0.7–2)
LACTATE SERPL-SCNC: 1.9 MMOL/L (ref 0.7–2)
LEUKOCYTE ESTERASE UR QL STRIP: NEGATIVE
LEUKOCYTE ESTERASE UR QL STRIP: NEGATIVE
LYMPHOCYTES # BLD AUTO: 0.8 10E3/UL (ref 0.8–5.3)
LYMPHOCYTES # BLD AUTO: 0.8 10E9/L (ref 0.8–5.3)
LYMPHOCYTES # BLD AUTO: 0.9 10E9/L (ref 0.8–5.3)
LYMPHOCYTES # BLD AUTO: 1 10E3/UL (ref 0.8–5.3)
LYMPHOCYTES # BLD AUTO: 1.1 10E3/UL (ref 0.8–5.3)
LYMPHOCYTES # BLD AUTO: 1.2 10E3/UL (ref 0.8–5.3)
LYMPHOCYTES # BLD AUTO: 1.2 10E9/L (ref 0.8–5.3)
LYMPHOCYTES # BLD AUTO: 1.3 10E3/UL (ref 0.8–5.3)
LYMPHOCYTES # BLD AUTO: 1.4 10E3/UL (ref 0.8–5.3)
LYMPHOCYTES # BLD AUTO: 1.4 10E3/UL (ref 0.8–5.3)
LYMPHOCYTES # BLD AUTO: 1.6 10E3/UL (ref 0.8–5.3)
LYMPHOCYTES # BLD AUTO: 1.7 10E3/UL (ref 0.8–5.3)
LYMPHOCYTES # BLD AUTO: 1.8 10E3/UL (ref 0.8–5.3)
LYMPHOCYTES # BLD AUTO: 1.9 10E3/UL (ref 0.8–5.3)
LYMPHOCYTES # BLD AUTO: 1.9 10E3/UL (ref 0.8–5.3)
LYMPHOCYTES # BLD AUTO: 2.1 10E3/UL (ref 0.8–5.3)
LYMPHOCYTES # BLD MANUAL: 1 10E3/UL (ref 0.8–5.3)
LYMPHOCYTES NFR BLD AUTO: 11 %
LYMPHOCYTES NFR BLD AUTO: 13 %
LYMPHOCYTES NFR BLD AUTO: 13 %
LYMPHOCYTES NFR BLD AUTO: 18.3 %
LYMPHOCYTES NFR BLD AUTO: 19 %
LYMPHOCYTES NFR BLD AUTO: 20.1 %
LYMPHOCYTES NFR BLD AUTO: 22 %
LYMPHOCYTES NFR BLD AUTO: 25.8 %
LYMPHOCYTES NFR BLD AUTO: 26 %
LYMPHOCYTES NFR BLD AUTO: 26 %
LYMPHOCYTES NFR BLD AUTO: 27 %
LYMPHOCYTES NFR BLD AUTO: 30 %
LYMPHOCYTES NFR BLD AUTO: 30 %
LYMPHOCYTES NFR BLD AUTO: 36 %
LYMPHOCYTES NFR BLD AUTO: 8 %
LYMPHOCYTES NFR BLD AUTO: 9 %
LYMPHOCYTES NFR BLD MANUAL: 53 %
Lab: NORMAL
MAGNESIUM SERPL-MCNC: 2.2 MG/DL (ref 1.6–2.3)
MAGNESIUM SERPL-MCNC: 2.3 MG/DL (ref 1.6–2.3)
MAGNESIUM SERPL-MCNC: 2.3 MG/DL (ref 1.6–2.3)
MCH RBC QN AUTO: 26.2 PG (ref 26.5–33)
MCH RBC QN AUTO: 27.2 PG (ref 26.5–33)
MCH RBC QN AUTO: 27.3 PG (ref 26.5–33)
MCH RBC QN AUTO: 27.3 PG (ref 26.5–33)
MCH RBC QN AUTO: 27.5 PG (ref 26.5–33)
MCH RBC QN AUTO: 27.7 PG (ref 26.5–33)
MCH RBC QN AUTO: 27.8 PG (ref 26.5–33)
MCH RBC QN AUTO: 28 PG (ref 26.5–33)
MCH RBC QN AUTO: 29 PG (ref 26.5–33)
MCH RBC QN AUTO: 29.6 PG (ref 26.5–33)
MCH RBC QN AUTO: 29.6 PG (ref 26.5–33)
MCH RBC QN AUTO: 29.7 PG (ref 26.5–33)
MCH RBC QN AUTO: 29.9 PG (ref 26.5–33)
MCH RBC QN AUTO: 30 PG (ref 26.5–33)
MCH RBC QN AUTO: 30.1 PG (ref 26.5–33)
MCH RBC QN AUTO: 30.2 PG (ref 26.5–33)
MCH RBC QN AUTO: 30.3 PG (ref 26.5–33)
MCH RBC QN AUTO: 30.4 PG (ref 26.5–33)
MCH RBC QN AUTO: 30.6 PG (ref 26.5–33)
MCH RBC QN AUTO: 30.8 PG (ref 26.5–33)
MCHC RBC AUTO-ENTMCNC: 29.7 G/DL (ref 31.5–36.5)
MCHC RBC AUTO-ENTMCNC: 29.8 G/DL (ref 31.5–36.5)
MCHC RBC AUTO-ENTMCNC: 30 G/DL (ref 31.5–36.5)
MCHC RBC AUTO-ENTMCNC: 30.1 G/DL (ref 31.5–36.5)
MCHC RBC AUTO-ENTMCNC: 30.2 G/DL (ref 31.5–36.5)
MCHC RBC AUTO-ENTMCNC: 30.4 G/DL (ref 31.5–36.5)
MCHC RBC AUTO-ENTMCNC: 30.8 G/DL (ref 31.5–36.5)
MCHC RBC AUTO-ENTMCNC: 31.3 G/DL (ref 31.5–36.5)
MCHC RBC AUTO-ENTMCNC: 31.7 G/DL (ref 31.5–36.5)
MCHC RBC AUTO-ENTMCNC: 33 G/DL (ref 31.5–36.5)
MCHC RBC AUTO-ENTMCNC: 33.1 G/DL (ref 31.5–36.5)
MCHC RBC AUTO-ENTMCNC: 33.4 G/DL (ref 31.5–36.5)
MCHC RBC AUTO-ENTMCNC: 33.4 G/DL (ref 31.5–36.5)
MCHC RBC AUTO-ENTMCNC: 33.5 G/DL (ref 31.5–36.5)
MCHC RBC AUTO-ENTMCNC: 33.5 G/DL (ref 31.5–36.5)
MCHC RBC AUTO-ENTMCNC: 33.7 G/DL (ref 31.5–36.5)
MCHC RBC AUTO-ENTMCNC: 33.8 G/DL (ref 31.5–36.5)
MCHC RBC AUTO-ENTMCNC: 33.8 G/DL (ref 31.5–36.5)
MCHC RBC AUTO-ENTMCNC: 33.9 G/DL (ref 31.5–36.5)
MCHC RBC AUTO-ENTMCNC: 34.1 G/DL (ref 31.5–36.5)
MCHC RBC AUTO-ENTMCNC: 34.3 G/DL (ref 31.5–36.5)
MCV RBC AUTO: 87 FL (ref 78–100)
MCV RBC AUTO: 88 FL (ref 78–100)
MCV RBC AUTO: 89 FL (ref 78–100)
MCV RBC AUTO: 90 FL (ref 78–100)
MCV RBC AUTO: 91 FL (ref 78–100)
MCV RBC AUTO: 92 FL (ref 78–100)
MCV RBC AUTO: 95 FL (ref 78–100)
MCV RBC AUTO: 96 FL (ref 78–100)
MONOCYTES # BLD AUTO: 0.5 10E3/UL (ref 0–1.3)
MONOCYTES # BLD AUTO: 0.5 10E9/L (ref 0–1.3)
MONOCYTES # BLD AUTO: 0.6 10E3/UL (ref 0–1.3)
MONOCYTES # BLD AUTO: 0.6 10E9/L (ref 0–1.3)
MONOCYTES # BLD AUTO: 0.7 10E3/UL (ref 0–1.3)
MONOCYTES # BLD AUTO: 0.7 10E9/L (ref 0–1.3)
MONOCYTES # BLD AUTO: 0.9 10E3/UL (ref 0–1.3)
MONOCYTES # BLD AUTO: 1 10E3/UL (ref 0–1.3)
MONOCYTES # BLD AUTO: 1.2 10E3/UL (ref 0–1.3)
MONOCYTES # BLD AUTO: 1.2 10E3/UL (ref 0–1.3)
MONOCYTES # BLD AUTO: 1.3 10E3/UL (ref 0–1.3)
MONOCYTES # BLD AUTO: 1.3 10E3/UL (ref 0–1.3)
MONOCYTES # BLD AUTO: 1.4 10E3/UL (ref 0–1.3)
MONOCYTES # BLD AUTO: 1.4 10E3/UL (ref 0–1.3)
MONOCYTES # BLD AUTO: 1.5 10E3/UL (ref 0–1.3)
MONOCYTES # BLD AUTO: 1.5 10E3/UL (ref 0–1.3)
MONOCYTES # BLD AUTO: 1.8 10E3/UL (ref 0–1.3)
MONOCYTES # BLD MANUAL: 0.1 10E3/UL (ref 0–1.3)
MONOCYTES NFR BLD AUTO: 10 %
MONOCYTES NFR BLD AUTO: 11.1 %
MONOCYTES NFR BLD AUTO: 12 %
MONOCYTES NFR BLD AUTO: 13 %
MONOCYTES NFR BLD AUTO: 13.6 %
MONOCYTES NFR BLD AUTO: 14.4 %
MONOCYTES NFR BLD AUTO: 15 %
MONOCYTES NFR BLD AUTO: 19 %
MONOCYTES NFR BLD AUTO: 19 %
MONOCYTES NFR BLD AUTO: 20 %
MONOCYTES NFR BLD AUTO: 20 %
MONOCYTES NFR BLD AUTO: 26 %
MONOCYTES NFR BLD AUTO: 7 %
MONOCYTES NFR BLD AUTO: 8 %
MONOCYTES NFR BLD AUTO: 9 %
MONOCYTES NFR BLD MANUAL: 5 %
MUCOUS THREADS #/AREA URNS LPF: PRESENT /LPF
NEUTROPHILS # BLD AUTO: 1.2 10E3/UL (ref 1.6–8.3)
NEUTROPHILS # BLD AUTO: 1.6 10E3/UL (ref 1.6–8.3)
NEUTROPHILS # BLD AUTO: 1.9 10E3/UL (ref 1.6–8.3)
NEUTROPHILS # BLD AUTO: 1.9 10E3/UL (ref 1.6–8.3)
NEUTROPHILS # BLD AUTO: 1.9 10E9/L (ref 1.6–8.3)
NEUTROPHILS # BLD AUTO: 12 10E3/UL (ref 1.6–8.3)
NEUTROPHILS # BLD AUTO: 12 10E3/UL (ref 1.6–8.3)
NEUTROPHILS # BLD AUTO: 12.8 10E3/UL (ref 1.6–8.3)
NEUTROPHILS # BLD AUTO: 13.7 10E3/UL (ref 1.6–8.3)
NEUTROPHILS # BLD AUTO: 14.5 10E3/UL (ref 1.6–8.3)
NEUTROPHILS # BLD AUTO: 15 10E3/UL (ref 1.6–8.3)
NEUTROPHILS # BLD AUTO: 17.2 10E3/UL (ref 1.6–8.3)
NEUTROPHILS # BLD AUTO: 2.5 10E3/UL (ref 1.6–8.3)
NEUTROPHILS # BLD AUTO: 2.7 10E9/L (ref 1.6–8.3)
NEUTROPHILS # BLD AUTO: 3.1 10E3/UL (ref 1.6–8.3)
NEUTROPHILS # BLD AUTO: 3.3 10E3/UL (ref 1.6–8.3)
NEUTROPHILS # BLD AUTO: 3.9 10E9/L (ref 1.6–8.3)
NEUTROPHILS # BLD AUTO: 6.6 10E3/UL (ref 1.6–8.3)
NEUTROPHILS # BLD AUTO: 9.7 10E3/UL (ref 1.6–8.3)
NEUTROPHILS # BLD MANUAL: 0.8 10E3/UL (ref 1.6–8.3)
NEUTROPHILS NFR BLD AUTO: 39 %
NEUTROPHILS NFR BLD AUTO: 44 %
NEUTROPHILS NFR BLD AUTO: 49 %
NEUTROPHILS NFR BLD AUTO: 51 %
NEUTROPHILS NFR BLD AUTO: 53 %
NEUTROPHILS NFR BLD AUTO: 55 %
NEUTROPHILS NFR BLD AUTO: 57.9 %
NEUTROPHILS NFR BLD AUTO: 58 %
NEUTROPHILS NFR BLD AUTO: 65.1 %
NEUTROPHILS NFR BLD AUTO: 66.8 %
NEUTROPHILS NFR BLD AUTO: 68 %
NEUTROPHILS NFR BLD AUTO: 68 %
NEUTROPHILS NFR BLD AUTO: 76 %
NEUTROPHILS NFR BLD AUTO: 79 %
NEUTROPHILS NFR BLD AUTO: 81 %
NEUTROPHILS NFR BLD AUTO: 81 %
NEUTROPHILS NFR BLD AUTO: 82 %
NEUTROPHILS NFR BLD AUTO: 83 %
NEUTROPHILS NFR BLD AUTO: 84 %
NEUTROPHILS NFR BLD MANUAL: 42 %
NITRATE UR QL: NEGATIVE
NITRATE UR QL: NEGATIVE
NRBC # BLD AUTO: 0 10*3/UL
NRBC # BLD AUTO: 0 10E3/UL
NRBC BLD AUTO-RTO: 0 /100
OTHER UNITS TRANSFUSED: NORMAL
OTHER UNITS TRANSFUSED: NORMAL
P AXIS - MUSE: 36 DEGREES
PATH REPORT.COMMENTS IMP SPEC: NORMAL
PH UR STRIP: 5.5 [PH] (ref 5–7)
PH UR STRIP: 5.5 [PH] (ref 5–7)
PLAT MORPH BLD: ABNORMAL
PLAT MORPH BLD: NORMAL
PLATELET # BLD AUTO: 109 10E3/UL (ref 150–450)
PLATELET # BLD AUTO: 111 10E3/UL (ref 150–450)
PLATELET # BLD AUTO: 118 10E9/L (ref 150–450)
PLATELET # BLD AUTO: 126 10E3/UL (ref 150–450)
PLATELET # BLD AUTO: 137 10E3/UL (ref 150–450)
PLATELET # BLD AUTO: 142 10E9/L (ref 150–450)
PLATELET # BLD AUTO: 147 10E9/L (ref 150–450)
PLATELET # BLD AUTO: 168 10E3/UL (ref 150–450)
PLATELET # BLD AUTO: 181 10E3/UL (ref 150–450)
PLATELET # BLD AUTO: 198 10E9/L (ref 150–450)
PLATELET # BLD AUTO: 215 10E3/UL (ref 150–450)
PLATELET # BLD AUTO: 215 10E3/UL (ref 150–450)
PLATELET # BLD AUTO: 218 10E3/UL (ref 150–450)
PLATELET # BLD AUTO: 222 10E3/UL (ref 150–450)
PLATELET # BLD AUTO: 226 10E3/UL (ref 150–450)
PLATELET # BLD AUTO: 234 10E3/UL (ref 150–450)
PLATELET # BLD AUTO: 245 10E3/UL (ref 150–450)
PLATELET # BLD AUTO: 250 10E3/UL (ref 150–450)
PLATELET # BLD AUTO: 257 10E3/UL (ref 150–450)
PLATELET # BLD AUTO: 263 10E3/UL (ref 150–450)
PLATELET # BLD AUTO: 283 10E3/UL (ref 150–450)
PLATELET # BLD AUTO: 66 10E3/UL (ref 150–450)
PLATELET # BLD AUTO: 76 10E3/UL (ref 150–450)
PLATELET # BLD AUTO: 98 10E3/UL (ref 150–450)
POST RXN CLERICAL CHECK: NORMAL
POST RXN CLERICAL CHECK: NORMAL
POST SPECIMEN APPEARANCE: NORMAL
POST SPECIMEN APPEARANCE: NORMAL
POST-RXN ABO/RH: NORMAL
POST-RXN ABO/RH: NORMAL
POST-RXN POLY DAT: NORMAL
POST-RXN POLY DAT: NORMAL
POTASSIUM BLD-SCNC: 3.4 MMOL/L (ref 3.4–5.3)
POTASSIUM BLD-SCNC: 3.7 MMOL/L (ref 3.4–5.3)
POTASSIUM BLD-SCNC: 3.7 MMOL/L (ref 3.4–5.3)
POTASSIUM BLD-SCNC: 3.8 MMOL/L (ref 3.4–5.3)
POTASSIUM BLD-SCNC: 3.9 MMOL/L (ref 3.4–5.3)
POTASSIUM BLD-SCNC: 3.9 MMOL/L (ref 3.4–5.3)
POTASSIUM BLD-SCNC: 4.1 MMOL/L (ref 3.4–5.3)
POTASSIUM BLD-SCNC: 4.2 MMOL/L (ref 3.4–5.3)
POTASSIUM BLD-SCNC: 4.6 MMOL/L (ref 3.4–5.3)
POTASSIUM SERPL-SCNC: 3.5 MMOL/L (ref 3.4–5.3)
POTASSIUM SERPL-SCNC: 3.5 MMOL/L (ref 3.4–5.3)
POTASSIUM SERPL-SCNC: 3.9 MMOL/L (ref 3.4–5.3)
PR INTERVAL - MUSE: 152 MS
PROCALCITONIN SERPL-MCNC: 1.07 NG/ML
PROCALCITONIN SERPL-MCNC: 1.36 NG/ML
PROCALCITONIN SERPL-MCNC: 1.84 NG/ML
PRODUCT CODE: NORMAL
PRODUCT CODE: NORMAL
PROT SERPL-MCNC: 5.6 G/DL (ref 6.8–8.8)
PROT SERPL-MCNC: 5.9 G/DL (ref 6.8–8.8)
PROT SERPL-MCNC: 5.9 G/DL (ref 6.8–8.8)
PROT SERPL-MCNC: 6.1 G/DL (ref 6.8–8.8)
PROT SERPL-MCNC: 6.3 G/DL (ref 6.8–8.8)
PROT SERPL-MCNC: 6.5 G/DL (ref 6.8–8.8)
PROT SERPL-MCNC: 6.6 G/DL (ref 6.8–8.8)
PROT SERPL-MCNC: 6.7 G/DL (ref 6.8–8.8)
PROT SERPL-MCNC: 6.8 G/DL (ref 6.8–8.8)
PROT SERPL-MCNC: 6.8 G/DL (ref 6.8–8.8)
PROT SERPL-MCNC: 6.9 G/DL (ref 6.8–8.8)
PROT SERPL-MCNC: 7 G/DL (ref 6.8–8.8)
QRS DURATION - MUSE: 92 MS
QT - MUSE: 362 MS
QTC - MUSE: 425 MS
R AXIS - MUSE: 57 DEGREES
RADIOLOGIST FLAGS: ABNORMAL
RBC # BLD AUTO: 2.29 10E6/UL (ref 4.4–5.9)
RBC # BLD AUTO: 2.42 10E6/UL (ref 4.4–5.9)
RBC # BLD AUTO: 2.48 10E6/UL (ref 4.4–5.9)
RBC # BLD AUTO: 2.58 10E6/UL (ref 4.4–5.9)
RBC # BLD AUTO: 2.65 10E6/UL (ref 4.4–5.9)
RBC # BLD AUTO: 2.68 10E6/UL (ref 4.4–5.9)
RBC # BLD AUTO: 2.7 10E6/UL (ref 4.4–5.9)
RBC # BLD AUTO: 2.81 10E6/UL (ref 4.4–5.9)
RBC # BLD AUTO: 2.86 10E6/UL (ref 4.4–5.9)
RBC # BLD AUTO: 3.15 10E6/UL (ref 4.4–5.9)
RBC # BLD AUTO: 3.41 10E6/UL (ref 4.4–5.9)
RBC # BLD AUTO: 3.43 10E6/UL (ref 4.4–5.9)
RBC # BLD AUTO: 3.67 10E6/UL (ref 4.4–5.9)
RBC # BLD AUTO: 3.68 10E6/UL (ref 4.4–5.9)
RBC # BLD AUTO: 3.78 10E6/UL (ref 4.4–5.9)
RBC # BLD AUTO: 3.79 10E6/UL (ref 4.4–5.9)
RBC # BLD AUTO: 3.91 10E6/UL (ref 4.4–5.9)
RBC # BLD AUTO: 3.98 10E6/UL (ref 4.4–5.9)
RBC # BLD AUTO: 4.11 10E12/L (ref 4.4–5.9)
RBC # BLD AUTO: 4.15 10E6/UL (ref 4.4–5.9)
RBC # BLD AUTO: 4.16 10E6/UL (ref 4.4–5.9)
RBC # BLD AUTO: 4.34 10E12/L (ref 4.4–5.9)
RBC # BLD AUTO: 4.71 10E12/L (ref 4.4–5.9)
RBC # BLD AUTO: 4.72 10E12/L (ref 4.4–5.9)
RBC #/AREA URNS AUTO: ABNORMAL /HPF
RBC MORPH BLD: ABNORMAL
RBC MORPH BLD: NORMAL
RSV RNA SPEC NAA+PROBE: NEGATIVE
SARS-COV-2 RNA RESP QL NAA+PROBE: NEGATIVE
SARS-COV-2 RNA RESP QL NAA+PROBE: NORMAL
SODIUM SERPL-SCNC: 134 MMOL/L (ref 133–144)
SODIUM SERPL-SCNC: 135 MMOL/L (ref 133–144)
SODIUM SERPL-SCNC: 136 MMOL/L (ref 133–144)
SODIUM SERPL-SCNC: 137 MMOL/L (ref 133–144)
SODIUM SERPL-SCNC: 138 MMOL/L (ref 133–144)
SODIUM SERPL-SCNC: 138 MMOL/L (ref 133–144)
SODIUM SERPL-SCNC: 139 MMOL/L (ref 133–144)
SODIUM SERPL-SCNC: 140 MMOL/L (ref 133–144)
SODIUM SERPL-SCNC: 141 MMOL/L (ref 133–144)
SODIUM SERPL-SCNC: 142 MMOL/L (ref 133–144)
SODIUM SERPL-SCNC: 143 MMOL/L (ref 133–144)
SP GR UR STRIP: 1.02 (ref 1–1.03)
SP GR UR STRIP: 1.02 (ref 1–1.03)
SPECIMEN EXPIRATION DATE: NORMAL
SPECIMEN EXPIRATION DATE: NORMAL
SPECIMEN SOURCE: NORMAL
SPECIMEN SOURCE: NORMAL
SYSTOLIC BLOOD PRESSURE - MUSE: NORMAL MMHG
T AXIS - MUSE: 35 DEGREES
UNIT ABO/RH: NORMAL
UNIT NUMBER: NORMAL
UNIT STATUS: NORMAL
UNIT TYPE ISBT: 5100
UNIT TYPE ISBT: 5100
UNIT TYPE ISBT: 7300
UNIT TYPE ISBT: 7300
UNIT TYPE ISBT: 9500
UPPER GI ENDOSCOPY: NORMAL
UROBILINOGEN UR STRIP-MCNC: NORMAL MG/DL
UROBILINOGEN UR STRIP-MCNC: NORMAL MG/DL
VARIANT LYMPHS BLD QL SMEAR: PRESENT
VENTRICULAR RATE- MUSE: 83 BPM
WBC # BLD AUTO: 1.9 10E3/UL (ref 4–11)
WBC # BLD AUTO: 14.2 10E3/UL (ref 4–11)
WBC # BLD AUTO: 14.3 10E3/UL (ref 4–11)
WBC # BLD AUTO: 14.7 10E3/UL (ref 4–11)
WBC # BLD AUTO: 15.4 10E3/UL (ref 4–11)
WBC # BLD AUTO: 15.6 10E3/UL (ref 4–11)
WBC # BLD AUTO: 15.8 10E3/UL (ref 4–11)
WBC # BLD AUTO: 15.9 10E3/UL (ref 4–11)
WBC # BLD AUTO: 17.2 10E3/UL (ref 4–11)
WBC # BLD AUTO: 17.7 10E3/UL (ref 4–11)
WBC # BLD AUTO: 18 10E3/UL (ref 4–11)
WBC # BLD AUTO: 2.8 10E3/UL (ref 4–11)
WBC # BLD AUTO: 20.6 10E3/UL (ref 4–11)
WBC # BLD AUTO: 3.3 10E9/L (ref 4–11)
WBC # BLD AUTO: 3.5 10E3/UL (ref 4–11)
WBC # BLD AUTO: 3.7 10E3/UL (ref 4–11)
WBC # BLD AUTO: 3.8 10E3/UL (ref 4–11)
WBC # BLD AUTO: 4.2 10E9/L (ref 4–11)
WBC # BLD AUTO: 4.7 10E3/UL (ref 4–11)
WBC # BLD AUTO: 5.1 10E9/L (ref 4–11)
WBC # BLD AUTO: 5.2 10E3/UL (ref 4–11)
WBC # BLD AUTO: 5.9 10E3/UL (ref 4–11)
WBC # BLD AUTO: 5.9 10E9/L (ref 4–11)
WBC # BLD AUTO: 9.7 10E3/UL (ref 4–11)
WBC #/AREA URNS AUTO: ABNORMAL /HPF

## 2021-01-01 PROCEDURE — 74174 CTA ABD&PLVS W/CONTRAST: CPT

## 2021-01-01 PROCEDURE — 99233 SBSQ HOSP IP/OBS HIGH 50: CPT | Performed by: HOSPITALIST

## 2021-01-01 PROCEDURE — C9113 INJ PANTOPRAZOLE SODIUM, VIA: HCPCS | Performed by: STUDENT IN AN ORGANIZED HEALTH CARE EDUCATION/TRAINING PROGRAM

## 2021-01-01 PROCEDURE — 99223 1ST HOSP IP/OBS HIGH 75: CPT | Mod: AI | Performed by: STUDENT IN AN ORGANIZED HEALTH CARE EDUCATION/TRAINING PROGRAM

## 2021-01-01 PROCEDURE — 258N000003 HC RX IP 258 OP 636: Performed by: STUDENT IN AN ORGANIZED HEALTH CARE EDUCATION/TRAINING PROGRAM

## 2021-01-01 PROCEDURE — 85730 THROMBOPLASTIN TIME PARTIAL: CPT | Performed by: STUDENT IN AN ORGANIZED HEALTH CARE EDUCATION/TRAINING PROGRAM

## 2021-01-01 PROCEDURE — 250N000011 HC RX IP 250 OP 636: Mod: JA | Performed by: STUDENT IN AN ORGANIZED HEALTH CARE EDUCATION/TRAINING PROGRAM

## 2021-01-01 PROCEDURE — U0003 INFECTIOUS AGENT DETECTION BY NUCLEIC ACID (DNA OR RNA); SEVERE ACUTE RESPIRATORY SYNDROME CORONAVIRUS 2 (SARS-COV-2) (CORONAVIRUS DISEASE [COVID-19]), AMPLIFIED PROBE TECHNIQUE, MAKING USE OF HIGH THROUGHPUT TECHNOLOGIES AS DESCRIBED BY CMS-2020-01-R: HCPCS | Performed by: STUDENT IN AN ORGANIZED HEALTH CARE EDUCATION/TRAINING PROGRAM

## 2021-01-01 PROCEDURE — 81003 URINALYSIS AUTO W/O SCOPE: CPT | Performed by: PHYSICIAN ASSISTANT

## 2021-01-01 PROCEDURE — 250N000011 HC RX IP 250 OP 636: Performed by: PHYSICIAN ASSISTANT

## 2021-01-01 PROCEDURE — 86850 RBC ANTIBODY SCREEN: CPT | Performed by: HOSPITALIST

## 2021-01-01 PROCEDURE — 99221 1ST HOSP IP/OBS SF/LOW 40: CPT | Mod: GC | Performed by: INTERNAL MEDICINE

## 2021-01-01 PROCEDURE — 83605 ASSAY OF LACTIC ACID: CPT | Performed by: PHYSICIAN ASSISTANT

## 2021-01-01 PROCEDURE — 82040 ASSAY OF SERUM ALBUMIN: CPT

## 2021-01-01 PROCEDURE — 36591 DRAW BLOOD OFF VENOUS DEVICE: CPT | Performed by: NURSE PRACTITIONER

## 2021-01-01 PROCEDURE — 250N000013 HC RX MED GY IP 250 OP 250 PS 637: Performed by: HOSPITALIST

## 2021-01-01 PROCEDURE — G0500 MOD SEDAT ENDO SERVICE >5YRS: HCPCS | Performed by: INTERNAL MEDICINE

## 2021-01-01 PROCEDURE — 85004 AUTOMATED DIFF WBC COUNT: CPT

## 2021-01-01 PROCEDURE — 258N000003 HC RX IP 258 OP 636: Performed by: INTERNAL MEDICINE

## 2021-01-01 PROCEDURE — 36415 COLL VENOUS BLD VENIPUNCTURE: CPT | Performed by: INTERNAL MEDICINE

## 2021-01-01 PROCEDURE — 97162 PT EVAL MOD COMPLEX 30 MIN: CPT | Mod: GP | Performed by: PHYSICAL THERAPIST

## 2021-01-01 PROCEDURE — 86301 IMMUNOASSAY TUMOR CA 19-9: CPT | Performed by: PHYSICIAN ASSISTANT

## 2021-01-01 PROCEDURE — G0463 HOSPITAL OUTPT CLINIC VISIT: HCPCS

## 2021-01-01 PROCEDURE — 97110 THERAPEUTIC EXERCISES: CPT | Mod: GP | Performed by: PHYSICAL THERAPIST

## 2021-01-01 PROCEDURE — 97802 MEDICAL NUTRITION INDIV IN: CPT | Mod: TEL,GT | Performed by: DIETITIAN, REGISTERED

## 2021-01-01 PROCEDURE — 80053 COMPREHEN METABOLIC PANEL: CPT | Performed by: NURSE PRACTITIONER

## 2021-01-01 PROCEDURE — 999N001193 HC VIDEO/TELEPHONE VISIT; NO CHARGE

## 2021-01-01 PROCEDURE — 99232 SBSQ HOSP IP/OBS MODERATE 35: CPT | Performed by: HOSPITALIST

## 2021-01-01 PROCEDURE — 83605 ASSAY OF LACTIC ACID: CPT | Performed by: HOSPITALIST

## 2021-01-01 PROCEDURE — 83605 ASSAY OF LACTIC ACID: CPT | Performed by: INTERNAL MEDICINE

## 2021-01-01 PROCEDURE — 96417 CHEMO IV INFUS EACH ADDL SEQ: CPT

## 2021-01-01 PROCEDURE — 96367 TX/PROPH/DG ADDL SEQ IV INF: CPT | Performed by: NURSE PRACTITIONER

## 2021-01-01 PROCEDURE — 99215 OFFICE O/P EST HI 40 MIN: CPT | Performed by: INTERNAL MEDICINE

## 2021-01-01 PROCEDURE — 36415 COLL VENOUS BLD VENIPUNCTURE: CPT | Performed by: STUDENT IN AN ORGANIZED HEALTH CARE EDUCATION/TRAINING PROGRAM

## 2021-01-01 PROCEDURE — 99215 OFFICE O/P EST HI 40 MIN: CPT | Mod: GT | Performed by: PHYSICIAN ASSISTANT

## 2021-01-01 PROCEDURE — 99233 SBSQ HOSP IP/OBS HIGH 50: CPT | Performed by: STUDENT IN AN ORGANIZED HEALTH CARE EDUCATION/TRAINING PROGRAM

## 2021-01-01 PROCEDURE — 83735 ASSAY OF MAGNESIUM: CPT | Performed by: HOSPITALIST

## 2021-01-01 PROCEDURE — 250N000011 HC RX IP 250 OP 636: Performed by: INTERNAL MEDICINE

## 2021-01-01 PROCEDURE — 85025 COMPLETE CBC W/AUTO DIFF WBC: CPT | Performed by: INTERNAL MEDICINE

## 2021-01-01 PROCEDURE — 96415 CHEMO IV INFUSION ADDL HR: CPT

## 2021-01-01 PROCEDURE — 96413 CHEMO IV INFUSION 1 HR: CPT | Performed by: NURSE PRACTITIONER

## 2021-01-01 PROCEDURE — 85025 COMPLETE CBC W/AUTO DIFF WBC: CPT | Performed by: PHYSICIAN ASSISTANT

## 2021-01-01 PROCEDURE — 81445 SO NEO GSAP 5-50DNA/DNA&RNA: CPT | Performed by: INTERNAL MEDICINE

## 2021-01-01 PROCEDURE — 86301 IMMUNOASSAY TUMOR CA 19-9: CPT | Performed by: INTERNAL MEDICINE

## 2021-01-01 PROCEDURE — 99207 PR NO CHARGE LOS: CPT

## 2021-01-01 PROCEDURE — P9040 RBC LEUKOREDUCED IRRADIATED: HCPCS | Performed by: STUDENT IN AN ORGANIZED HEALTH CARE EDUCATION/TRAINING PROGRAM

## 2021-01-01 PROCEDURE — 96375 TX/PRO/DX INJ NEW DRUG ADDON: CPT

## 2021-01-01 PROCEDURE — 99207 PR NO BILLABLE SERVICE THIS VISIT: CPT

## 2021-01-01 PROCEDURE — 96375 TX/PRO/DX INJ NEW DRUG ADDON: CPT | Performed by: NURSE PRACTITIONER

## 2021-01-01 PROCEDURE — 250N000013 HC RX MED GY IP 250 OP 250 PS 637: Performed by: STUDENT IN AN ORGANIZED HEALTH CARE EDUCATION/TRAINING PROGRAM

## 2021-01-01 PROCEDURE — 96367 TX/PROPH/DG ADDL SEQ IV INF: CPT

## 2021-01-01 PROCEDURE — 97803 MED NUTRITION INDIV SUBSEQ: CPT | Mod: GT | Performed by: DIETITIAN, REGISTERED

## 2021-01-01 PROCEDURE — 86850 RBC ANTIBODY SCREEN: CPT | Performed by: STUDENT IN AN ORGANIZED HEALTH CARE EDUCATION/TRAINING PROGRAM

## 2021-01-01 PROCEDURE — 96367 TX/PROPH/DG ADDL SEQ IV INF: CPT | Performed by: OBSTETRICS & GYNECOLOGY

## 2021-01-01 PROCEDURE — 96413 CHEMO IV INFUSION 1 HR: CPT

## 2021-01-01 PROCEDURE — 99207 PR NO CHARGE NURSE ONLY: CPT

## 2021-01-01 PROCEDURE — 36591 DRAW BLOOD OFF VENOUS DEVICE: CPT | Performed by: PHYSICIAN ASSISTANT

## 2021-01-01 PROCEDURE — 43255 EGD CONTROL BLEEDING ANY: CPT | Performed by: INTERNAL MEDICINE

## 2021-01-01 PROCEDURE — 80053 COMPREHEN METABOLIC PANEL: CPT | Performed by: PHYSICIAN ASSISTANT

## 2021-01-01 PROCEDURE — 80053 COMPREHEN METABOLIC PANEL: CPT | Performed by: STUDENT IN AN ORGANIZED HEALTH CARE EDUCATION/TRAINING PROGRAM

## 2021-01-01 PROCEDURE — 74177 CT ABD & PELVIS W/CONTRAST: CPT | Mod: GC | Performed by: RADIOLOGY

## 2021-01-01 PROCEDURE — 250N000011 HC RX IP 250 OP 636: Performed by: STUDENT IN AN ORGANIZED HEALTH CARE EDUCATION/TRAINING PROGRAM

## 2021-01-01 PROCEDURE — 86923 COMPATIBILITY TEST ELECTRIC: CPT | Performed by: HOSPITALIST

## 2021-01-01 PROCEDURE — 82310 ASSAY OF CALCIUM: CPT | Performed by: HOSPITALIST

## 2021-01-01 PROCEDURE — 96415 CHEMO IV INFUSION ADDL HR: CPT | Performed by: NURSE PRACTITIONER

## 2021-01-01 PROCEDURE — 85027 COMPLETE CBC AUTOMATED: CPT | Performed by: INTERNAL MEDICINE

## 2021-01-01 PROCEDURE — 84145 PROCALCITONIN (PCT): CPT | Performed by: HOSPITALIST

## 2021-01-01 PROCEDURE — U0003 INFECTIOUS AGENT DETECTION BY NUCLEIC ACID (DNA OR RNA); SEVERE ACUTE RESPIRATORY SYNDROME CORONAVIRUS 2 (SARS-COV-2) (CORONAVIRUS DISEASE [COVID-19]), AMPLIFIED PROBE TECHNIQUE, MAKING USE OF HIGH THROUGHPUT TECHNOLOGIES AS DESCRIBED BY CMS-2020-01-R: HCPCS | Performed by: INTERNAL MEDICINE

## 2021-01-01 PROCEDURE — 85610 PROTHROMBIN TIME: CPT | Performed by: STUDENT IN AN ORGANIZED HEALTH CARE EDUCATION/TRAINING PROGRAM

## 2021-01-01 PROCEDURE — C1769 GUIDE WIRE: HCPCS

## 2021-01-01 PROCEDURE — 77001 FLUOROGUIDE FOR VEIN DEVICE: CPT

## 2021-01-01 PROCEDURE — 96413 CHEMO IV INFUSION 1 HR: CPT | Performed by: OBSTETRICS & GYNECOLOGY

## 2021-01-01 PROCEDURE — 999N001020 HC STATISTIC H-SEND OUTS PREP: Performed by: INTERNAL MEDICINE

## 2021-01-01 PROCEDURE — 71260 CT THORAX DX C+: CPT | Performed by: RADIOLOGY

## 2021-01-01 PROCEDURE — 71046 X-RAY EXAM CHEST 2 VIEWS: CPT

## 2021-01-01 PROCEDURE — 96417 CHEMO IV INFUS EACH ADDL SEQ: CPT | Performed by: NURSE PRACTITIONER

## 2021-01-01 PROCEDURE — 80053 COMPREHEN METABOLIC PANEL: CPT | Performed by: INTERNAL MEDICINE

## 2021-01-01 PROCEDURE — 96416 CHEMO PROLONG INFUSE W/PUMP: CPT | Performed by: NURSE PRACTITIONER

## 2021-01-01 PROCEDURE — 99223 1ST HOSP IP/OBS HIGH 75: CPT | Mod: GC | Performed by: INTERNAL MEDICINE

## 2021-01-01 PROCEDURE — 99152 MOD SED SAME PHYS/QHP 5/>YRS: CPT

## 2021-01-01 PROCEDURE — G0498 CHEMO EXTEND IV INFUS W/PUMP: HCPCS

## 2021-01-01 PROCEDURE — 36591 DRAW BLOOD OFF VENOUS DEVICE: CPT | Performed by: HOSPITALIST

## 2021-01-01 PROCEDURE — C9803 HOPD COVID-19 SPEC COLLECT: HCPCS | Performed by: STUDENT IN AN ORGANIZED HEALTH CARE EDUCATION/TRAINING PROGRAM

## 2021-01-01 PROCEDURE — 81301 MICROSATELLITE INSTABILITY: CPT | Performed by: INTERNAL MEDICINE

## 2021-01-01 PROCEDURE — 99239 HOSP IP/OBS DSCHRG MGMT >30: CPT | Performed by: HOSPITALIST

## 2021-01-01 PROCEDURE — 99417 PROLNG OP E/M EACH 15 MIN: CPT | Performed by: INTERNAL MEDICINE

## 2021-01-01 PROCEDURE — 36592 COLLECT BLOOD FROM PICC: CPT | Performed by: STUDENT IN AN ORGANIZED HEALTH CARE EDUCATION/TRAINING PROGRAM

## 2021-01-01 PROCEDURE — 71260 CT THORAX DX C+: CPT | Mod: GC | Performed by: STUDENT IN AN ORGANIZED HEALTH CARE EDUCATION/TRAINING PROGRAM

## 2021-01-01 PROCEDURE — 36415 COLL VENOUS BLD VENIPUNCTURE: CPT | Performed by: HOSPITALIST

## 2021-01-01 PROCEDURE — 96377 APPLICATON ON-BODY INJECTOR: CPT | Mod: 59 | Performed by: NURSE PRACTITIONER

## 2021-01-01 PROCEDURE — 99000 SPECIMEN HANDLING OFFICE-LAB: CPT | Performed by: INTERNAL MEDICINE

## 2021-01-01 PROCEDURE — 74177 CT ABD & PELVIS W/CONTRAST: CPT | Performed by: RADIOLOGY

## 2021-01-01 PROCEDURE — 99417 PROLNG OP E/M EACH 15 MIN: CPT | Performed by: PHYSICIAN ASSISTANT

## 2021-01-01 PROCEDURE — 85025 COMPLETE CBC W/AUTO DIFF WBC: CPT | Performed by: HOSPITALIST

## 2021-01-01 PROCEDURE — C1788 PORT, INDWELLING, IMP: HCPCS

## 2021-01-01 PROCEDURE — 85025 COMPLETE CBC W/AUTO DIFF WBC: CPT | Performed by: NURSE PRACTITIONER

## 2021-01-01 PROCEDURE — G0463 HOSPITAL OUTPT CLINIC VISIT: HCPCS | Mod: 25

## 2021-01-01 PROCEDURE — 36561 INSERT TUNNELED CV CATH: CPT

## 2021-01-01 PROCEDURE — 86301 IMMUNOASSAY TUMOR CA 19-9: CPT | Mod: 90 | Performed by: NURSE PRACTITIONER

## 2021-01-01 PROCEDURE — 81001 URINALYSIS AUTO W/SCOPE: CPT | Performed by: INTERNAL MEDICINE

## 2021-01-01 PROCEDURE — 86301 IMMUNOASSAY TUMOR CA 19-9: CPT | Mod: 90 | Performed by: INTERNAL MEDICINE

## 2021-01-01 PROCEDURE — 88323 CONSLTJ&REPRT MATRL PREP SLD: CPT | Mod: 26 | Performed by: PATHOLOGY

## 2021-01-01 PROCEDURE — XW0G886 INTRODUCTION OF MINERAL-BASED TOPICAL HEMOSTATIC AGENT INTO UPPER GI, VIA NATURAL OR ARTIFICIAL OPENING ENDOSCOPIC, NEW TECHNOLOGY GROUP 6: ICD-10-PCS | Performed by: INTERNAL MEDICINE

## 2021-01-01 PROCEDURE — 86078 PHYS BLOOD BANK SERV REACTJ: CPT | Mod: XS | Performed by: PATHOLOGY

## 2021-01-01 PROCEDURE — 86901 BLOOD TYPING SEROLOGIC RH(D): CPT | Performed by: STUDENT IN AN ORGANIZED HEALTH CARE EDUCATION/TRAINING PROGRAM

## 2021-01-01 PROCEDURE — 93005 ELECTROCARDIOGRAM TRACING: CPT | Performed by: STUDENT IN AN ORGANIZED HEALTH CARE EDUCATION/TRAINING PROGRAM

## 2021-01-01 PROCEDURE — G0452 MOLECULAR PATHOLOGY INTERPR: HCPCS | Mod: 26 | Performed by: PATHOLOGY

## 2021-01-01 PROCEDURE — 999N001032 HC STATISTIC REVIEW OUTSIDE SLIDES TC 88321: Performed by: INTERNAL MEDICINE

## 2021-01-01 PROCEDURE — 71046 X-RAY EXAM CHEST 2 VIEWS: CPT | Mod: 26 | Performed by: STUDENT IN AN ORGANIZED HEALTH CARE EDUCATION/TRAINING PROGRAM

## 2021-01-01 PROCEDURE — 120N000002 HC R&B MED SURG/OB UMMC

## 2021-01-01 PROCEDURE — 96360 HYDRATION IV INFUSION INIT: CPT | Performed by: INTERNAL MEDICINE

## 2021-01-01 PROCEDURE — 250N000011 HC RX IP 250 OP 636: Performed by: HOSPITALIST

## 2021-01-01 PROCEDURE — 85027 COMPLETE CBC AUTOMATED: CPT | Performed by: RADIOLOGY

## 2021-01-01 PROCEDURE — 258N000003 HC RX IP 258 OP 636: Performed by: PHYSICIAN ASSISTANT

## 2021-01-01 PROCEDURE — 272N000504 HC NEEDLE CR4

## 2021-01-01 PROCEDURE — 86301 IMMUNOASSAY TUMOR CA 19-9: CPT

## 2021-01-01 PROCEDURE — 999N001105

## 2021-01-01 PROCEDURE — 96417 CHEMO IV INFUS EACH ADDL SEQ: CPT | Performed by: OBSTETRICS & GYNECOLOGY

## 2021-01-01 PROCEDURE — U0005 INFEC AGEN DETEC AMPLI PROBE: HCPCS | Performed by: INTERNAL MEDICINE

## 2021-01-01 PROCEDURE — 250N000009 HC RX 250: Performed by: INTERNAL MEDICINE

## 2021-01-01 PROCEDURE — 250N000011 HC RX IP 250 OP 636: Performed by: RADIOLOGY

## 2021-01-01 PROCEDURE — 99215 OFFICE O/P EST HI 40 MIN: CPT | Mod: GT | Performed by: INTERNAL MEDICINE

## 2021-01-01 PROCEDURE — 86923 COMPATIBILITY TEST ELECTRIC: CPT | Performed by: STUDENT IN AN ORGANIZED HEALTH CARE EDUCATION/TRAINING PROGRAM

## 2021-01-01 PROCEDURE — 36415 COLL VENOUS BLD VENIPUNCTURE: CPT | Performed by: PHYSICIAN ASSISTANT

## 2021-01-01 PROCEDURE — 93010 ELECTROCARDIOGRAM REPORT: CPT | Performed by: STUDENT IN AN ORGANIZED HEALTH CARE EDUCATION/TRAINING PROGRAM

## 2021-01-01 PROCEDURE — 74177 CT ABD & PELVIS W/CONTRAST: CPT | Mod: GC | Performed by: STUDENT IN AN ORGANIZED HEALTH CARE EDUCATION/TRAINING PROGRAM

## 2021-01-01 PROCEDURE — 99205 OFFICE O/P NEW HI 60 MIN: CPT | Mod: GT | Performed by: INTERNAL MEDICINE

## 2021-01-01 PROCEDURE — 96366 THER/PROPH/DIAG IV INF ADDON: CPT | Performed by: STUDENT IN AN ORGANIZED HEALTH CARE EDUCATION/TRAINING PROGRAM

## 2021-01-01 PROCEDURE — 99000 SPECIMEN HANDLING OFFICE-LAB: CPT | Performed by: NURSE PRACTITIONER

## 2021-01-01 PROCEDURE — 36591 DRAW BLOOD OFF VENOUS DEVICE: CPT

## 2021-01-01 PROCEDURE — 86078 PHYS BLOOD BANK SERV REACTJ: CPT | Performed by: PATHOLOGY

## 2021-01-01 PROCEDURE — 250N000013 HC RX MED GY IP 250 OP 250 PS 637: Performed by: PHYSICIAN ASSISTANT

## 2021-01-01 PROCEDURE — 999N000102 HC STATISTIC NO CHARGE CLINIC VISIT

## 2021-01-01 PROCEDURE — 85014 HEMATOCRIT: CPT | Performed by: HOSPITALIST

## 2021-01-01 PROCEDURE — 99215 OFFICE O/P EST HI 40 MIN: CPT | Performed by: PHYSICIAN ASSISTANT

## 2021-01-01 PROCEDURE — 71260 CT THORAX DX C+: CPT | Mod: GC | Performed by: RADIOLOGY

## 2021-01-01 PROCEDURE — 99232 SBSQ HOSP IP/OBS MODERATE 35: CPT | Performed by: STUDENT IN AN ORGANIZED HEALTH CARE EDUCATION/TRAINING PROGRAM

## 2021-01-01 PROCEDURE — 36591 DRAW BLOOD OFF VENOUS DEVICE: CPT | Performed by: INTERNAL MEDICINE

## 2021-01-01 PROCEDURE — 85610 PROTHROMBIN TIME: CPT | Performed by: RADIOLOGY

## 2021-01-01 PROCEDURE — 83605 ASSAY OF LACTIC ACID: CPT | Performed by: NURSE PRACTITIONER

## 2021-01-01 PROCEDURE — U0003 INFECTIOUS AGENT DETECTION BY NUCLEIC ACID (DNA OR RNA); SEVERE ACUTE RESPIRATORY SYNDROME CORONAVIRUS 2 (SARS-COV-2) (CORONAVIRUS DISEASE [COVID-19]), AMPLIFIED PROBE TECHNIQUE, MAKING USE OF HIGH THROUGHPUT TECHNOLOGIES AS DESCRIBED BY CMS-2020-01-R: HCPCS | Performed by: HOSPITALIST

## 2021-01-01 PROCEDURE — 272N000604 HC WOUND GLUE CR3

## 2021-01-01 PROCEDURE — 97803 MED NUTRITION INDIV SUBSEQ: CPT | Mod: TEL,GT | Performed by: DIETITIAN, REGISTERED

## 2021-01-01 PROCEDURE — 88323 CONSLTJ&REPRT MATRL PREP SLD: CPT | Mod: TC | Performed by: INTERNAL MEDICINE

## 2021-01-01 PROCEDURE — 96365 THER/PROPH/DIAG IV INF INIT: CPT | Performed by: STUDENT IN AN ORGANIZED HEALTH CARE EDUCATION/TRAINING PROGRAM

## 2021-01-01 PROCEDURE — 87040 BLOOD CULTURE FOR BACTERIA: CPT | Performed by: PHYSICIAN ASSISTANT

## 2021-01-01 PROCEDURE — 99153 MOD SED SAME PHYS/QHP EA: CPT | Performed by: INTERNAL MEDICINE

## 2021-01-01 PROCEDURE — 36592 COLLECT BLOOD FROM PICC: CPT | Performed by: PHYSICIAN ASSISTANT

## 2021-01-01 PROCEDURE — 999N000128 HC STATISTIC PERIPHERAL IV START W/O US GUIDANCE

## 2021-01-01 PROCEDURE — 80048 BASIC METABOLIC PNL TOTAL CA: CPT | Performed by: HOSPITALIST

## 2021-01-01 PROCEDURE — P9016 RBC LEUKOCYTES REDUCED: HCPCS | Performed by: HOSPITALIST

## 2021-01-01 PROCEDURE — 85025 COMPLETE CBC W/AUTO DIFF WBC: CPT | Performed by: STUDENT IN AN ORGANIZED HEALTH CARE EDUCATION/TRAINING PROGRAM

## 2021-01-01 PROCEDURE — 74174 CTA ABD&PLVS W/CONTRAST: CPT | Mod: 26 | Performed by: RADIOLOGY

## 2021-01-01 PROCEDURE — 80048 BASIC METABOLIC PNL TOTAL CA: CPT | Performed by: PHYSICIAN ASSISTANT

## 2021-01-01 PROCEDURE — 99223 1ST HOSP IP/OBS HIGH 75: CPT | Performed by: INTERNAL MEDICINE

## 2021-01-01 PROCEDURE — 99285 EMERGENCY DEPT VISIT HI MDM: CPT | Mod: 25 | Performed by: STUDENT IN AN ORGANIZED HEALTH CARE EDUCATION/TRAINING PROGRAM

## 2021-01-01 PROCEDURE — 99222 1ST HOSP IP/OBS MODERATE 55: CPT | Mod: 25 | Performed by: NURSE PRACTITIONER

## 2021-01-01 PROCEDURE — 99222 1ST HOSP IP/OBS MODERATE 55: CPT | Performed by: ANESTHESIOLOGY

## 2021-01-01 PROCEDURE — 99207 PR CDG-MDM COMPONENT: MEETS HIGH - UP CODED: CPT | Performed by: HOSPITALIST

## 2021-01-01 PROCEDURE — 250N000011 HC RX IP 250 OP 636

## 2021-01-01 PROCEDURE — 85004 AUTOMATED DIFF WBC COUNT: CPT | Performed by: HOSPITALIST

## 2021-01-01 PROCEDURE — 96376 TX/PRO/DX INJ SAME DRUG ADON: CPT

## 2021-01-01 PROCEDURE — 85018 HEMOGLOBIN: CPT | Performed by: STUDENT IN AN ORGANIZED HEALTH CARE EDUCATION/TRAINING PROGRAM

## 2021-01-01 PROCEDURE — 96375 TX/PRO/DX INJ NEW DRUG ADDON: CPT | Performed by: STUDENT IN AN ORGANIZED HEALTH CARE EDUCATION/TRAINING PROGRAM

## 2021-01-01 PROCEDURE — 250N000009 HC RX 250: Performed by: RADIOLOGY

## 2021-01-01 PROCEDURE — 99207 PR CDG-MDM COMPONENT: MEETS MODERATE - UP CODED: CPT | Performed by: STUDENT IN AN ORGANIZED HEALTH CARE EDUCATION/TRAINING PROGRAM

## 2021-01-01 PROCEDURE — 87637 SARSCOV2&INF A&B&RSV AMP PRB: CPT | Performed by: HOSPITALIST

## 2021-01-01 PROCEDURE — 82040 ASSAY OF SERUM ALBUMIN: CPT | Performed by: STUDENT IN AN ORGANIZED HEALTH CARE EDUCATION/TRAINING PROGRAM

## 2021-01-01 PROCEDURE — 99291 CRITICAL CARE FIRST HOUR: CPT | Mod: 25 | Performed by: STUDENT IN AN ORGANIZED HEALTH CARE EDUCATION/TRAINING PROGRAM

## 2021-01-01 PROCEDURE — 85014 HEMATOCRIT: CPT | Performed by: STUDENT IN AN ORGANIZED HEALTH CARE EDUCATION/TRAINING PROGRAM

## 2021-01-01 PROCEDURE — 96377 APPLICATON ON-BODY INJECTOR: CPT | Mod: 59 | Performed by: OBSTETRICS & GYNECOLOGY

## 2021-01-01 PROCEDURE — 76937 US GUIDE VASCULAR ACCESS: CPT

## 2021-01-01 RX ORDER — DIPHENHYDRAMINE HYDROCHLORIDE 50 MG/ML
50 INJECTION INTRAMUSCULAR; INTRAVENOUS
Status: CANCELLED
Start: 2021-01-01

## 2021-01-01 RX ORDER — ATROPINE SULFATE 0.4 MG/ML
0.4 AMPUL (ML) INJECTION
Status: CANCELLED | OUTPATIENT
Start: 2021-01-01

## 2021-01-01 RX ORDER — HEPARIN SODIUM,PORCINE 10 UNIT/ML
5 VIAL (ML) INTRAVENOUS
Status: CANCELLED | OUTPATIENT
Start: 2021-01-01

## 2021-01-01 RX ORDER — HEPARIN SODIUM (PORCINE) LOCK FLUSH IV SOLN 100 UNIT/ML 100 UNIT/ML
5 SOLUTION INTRAVENOUS
Status: DISCONTINUED | OUTPATIENT
Start: 2021-01-01 | End: 2021-01-01 | Stop reason: HOSPADM

## 2021-01-01 RX ORDER — POLYETHYLENE GLYCOL 3350 17 G/17G
17 POWDER, FOR SOLUTION ORAL DAILY
Qty: 510 G | Refills: 0 | Status: SHIPPED | OUTPATIENT
Start: 2021-01-01 | End: 2022-01-03

## 2021-01-01 RX ORDER — NALOXONE HYDROCHLORIDE 0.4 MG/ML
0.4 INJECTION, SOLUTION INTRAMUSCULAR; INTRAVENOUS; SUBCUTANEOUS
Status: DISCONTINUED | OUTPATIENT
Start: 2021-01-01 | End: 2021-01-01 | Stop reason: HOSPADM

## 2021-01-01 RX ORDER — PANCRELIPASE LIPASE, PANCRELIPASE PROTEASE, PANCRELIPASE AMYLASE 40000; 126000; 168000 [USP'U]/1; [USP'U]/1; [USP'U]/1
CAPSULE, DELAYED RELEASE ORAL
Qty: 360 CAPSULE | Refills: 1 | Status: SHIPPED | OUTPATIENT
Start: 2021-01-01

## 2021-01-01 RX ORDER — ALBUTEROL SULFATE 5 MG/ML
2.5 SOLUTION RESPIRATORY (INHALATION)
Status: CANCELLED | OUTPATIENT
Start: 2021-01-01

## 2021-01-01 RX ORDER — DEXAMETHASONE 4 MG/1
8 TABLET ORAL DAILY
Qty: 6 TABLET | Refills: 1 | Status: SHIPPED | OUTPATIENT
Start: 2021-01-01 | End: 2021-01-01

## 2021-01-01 RX ORDER — PACLITAXEL 100 MG/20ML
125 INJECTION, POWDER, LYOPHILIZED, FOR SUSPENSION INTRAVENOUS ONCE
Status: CANCELLED | OUTPATIENT
Start: 2021-01-01

## 2021-01-01 RX ORDER — CODEINE PHOSPHATE AND GUAIFENESIN 10; 100 MG/5ML; MG/5ML
1-2 SOLUTION ORAL EVERY 4 HOURS PRN
Qty: 180 ML | Refills: 1 | Status: SHIPPED | OUTPATIENT
Start: 2021-01-01 | End: 2021-01-01

## 2021-01-01 RX ORDER — ZOLEDRONIC ACID 0.04 MG/ML
4 INJECTION, SOLUTION INTRAVENOUS ONCE
Status: CANCELLED
Start: 2021-01-01 | End: 2021-01-01

## 2021-01-01 RX ORDER — SENNOSIDES 8.6 MG
8.6 TABLET ORAL DAILY PRN
Status: DISCONTINUED | OUTPATIENT
Start: 2021-01-01 | End: 2021-01-01 | Stop reason: HOSPADM

## 2021-01-01 RX ORDER — LORAZEPAM 2 MG/ML
0.5 INJECTION INTRAMUSCULAR EVERY 4 HOURS PRN
Status: CANCELLED | OUTPATIENT
Start: 2021-01-01

## 2021-01-01 RX ORDER — PALONOSETRON 0.05 MG/ML
0.25 INJECTION, SOLUTION INTRAVENOUS ONCE
Status: COMPLETED | OUTPATIENT
Start: 2021-01-01 | End: 2021-01-01

## 2021-01-01 RX ORDER — METHYLPREDNISOLONE SODIUM SUCCINATE 125 MG/2ML
125 INJECTION, POWDER, LYOPHILIZED, FOR SOLUTION INTRAMUSCULAR; INTRAVENOUS
Status: CANCELLED
Start: 2021-01-01

## 2021-01-01 RX ORDER — NALOXONE HYDROCHLORIDE 0.4 MG/ML
0.2 INJECTION, SOLUTION INTRAMUSCULAR; INTRAVENOUS; SUBCUTANEOUS
Status: CANCELLED | OUTPATIENT
Start: 2021-01-01

## 2021-01-01 RX ORDER — PALONOSETRON 0.05 MG/ML
0.25 INJECTION, SOLUTION INTRAVENOUS ONCE
Status: CANCELLED
Start: 2021-01-01

## 2021-01-01 RX ORDER — ALBUTEROL SULFATE 90 UG/1
1-2 AEROSOL, METERED RESPIRATORY (INHALATION)
Status: CANCELLED
Start: 2022-06-05

## 2021-01-01 RX ORDER — EPINEPHRINE 1 MG/ML
0.3 INJECTION, SOLUTION INTRAMUSCULAR; SUBCUTANEOUS EVERY 5 MIN PRN
Status: CANCELLED | OUTPATIENT
Start: 2021-01-01

## 2021-01-01 RX ORDER — ALBUTEROL SULFATE 90 UG/1
1-2 AEROSOL, METERED RESPIRATORY (INHALATION)
Status: CANCELLED
Start: 2021-01-01

## 2021-01-01 RX ORDER — POLYETHYLENE GLYCOL 3350 17 G/17G
17 POWDER, FOR SOLUTION ORAL DAILY
Status: DISCONTINUED | OUTPATIENT
Start: 2021-01-01 | End: 2021-01-01 | Stop reason: HOSPADM

## 2021-01-01 RX ORDER — ZOLEDRONIC ACID 0.04 MG/ML
4 INJECTION, SOLUTION INTRAVENOUS ONCE
Status: CANCELLED
Start: 2022-06-05 | End: 2022-06-05

## 2021-01-01 RX ORDER — PANCRELIPASE LIPASE, PANCRELIPASE PROTEASE, PANCRELIPASE AMYLASE 40000; 126000; 168000 [USP'U]/1; [USP'U]/1; [USP'U]/1
CAPSULE, DELAYED RELEASE ORAL
Qty: 360 CAPSULE | Refills: 1 | Status: SHIPPED | OUTPATIENT
Start: 2021-01-01 | End: 2021-01-01

## 2021-01-01 RX ORDER — ALBUTEROL SULFATE 0.83 MG/ML
2.5 SOLUTION RESPIRATORY (INHALATION)
Status: CANCELLED | OUTPATIENT
Start: 2021-01-01

## 2021-01-01 RX ORDER — HEPARIN SODIUM (PORCINE) LOCK FLUSH IV SOLN 100 UNIT/ML 100 UNIT/ML
5 SOLUTION INTRAVENOUS EVERY 8 HOURS PRN
Status: DISCONTINUED | OUTPATIENT
Start: 2021-01-01 | End: 2021-01-01 | Stop reason: HOSPADM

## 2021-01-01 RX ORDER — EPINEPHRINE 1 MG/ML
0.3 INJECTION, SOLUTION INTRAMUSCULAR; SUBCUTANEOUS EVERY 5 MIN PRN
Status: CANCELLED | OUTPATIENT
Start: 2022-06-05

## 2021-01-01 RX ORDER — CIPROFLOXACIN 500 MG/1
500 TABLET, FILM COATED ORAL EVERY 12 HOURS
Qty: 21 TABLET | Refills: 0 | Status: SHIPPED | OUTPATIENT
Start: 2021-01-01 | End: 2021-01-01

## 2021-01-01 RX ORDER — LORAZEPAM 2 MG/ML
0.5 INJECTION INTRAMUSCULAR EVERY 4 HOURS PRN
Status: CANCELLED
Start: 2021-01-01

## 2021-01-01 RX ORDER — HEPARIN SODIUM (PORCINE) LOCK FLUSH IV SOLN 100 UNIT/ML 100 UNIT/ML
5 SOLUTION INTRAVENOUS
Status: CANCELLED | OUTPATIENT
Start: 2021-01-01

## 2021-01-01 RX ORDER — MEPERIDINE HYDROCHLORIDE 25 MG/ML
25 INJECTION INTRAMUSCULAR; INTRAVENOUS; SUBCUTANEOUS EVERY 30 MIN PRN
Status: CANCELLED | OUTPATIENT
Start: 2021-01-01

## 2021-01-01 RX ORDER — PACLITAXEL 100 MG/20ML
125 INJECTION, POWDER, LYOPHILIZED, FOR SUSPENSION INTRAVENOUS ONCE
Status: DISCONTINUED | OUTPATIENT
Start: 2021-01-01 | End: 2021-01-01 | Stop reason: CLARIF

## 2021-01-01 RX ORDER — ACETAMINOPHEN 325 MG/1
650 TABLET ORAL EVERY 4 HOURS PRN
Status: DISCONTINUED | OUTPATIENT
Start: 2021-01-01 | End: 2021-01-01

## 2021-01-01 RX ORDER — PIPERACILLIN SODIUM, TAZOBACTAM SODIUM 3; .375 G/15ML; G/15ML
3.38 INJECTION, POWDER, LYOPHILIZED, FOR SOLUTION INTRAVENOUS EVERY 6 HOURS
Status: COMPLETED | OUTPATIENT
Start: 2021-01-01 | End: 2021-01-01

## 2021-01-01 RX ORDER — HEPARIN SODIUM (PORCINE) LOCK FLUSH IV SOLN 100 UNIT/ML 100 UNIT/ML
500 SOLUTION INTRAVENOUS EVERY 8 HOURS
Status: DISCONTINUED | OUTPATIENT
Start: 2021-01-01 | End: 2021-01-01 | Stop reason: HOSPADM

## 2021-01-01 RX ORDER — ATROPINE SULFATE 0.4 MG/ML
0.4 AMPUL (ML) INJECTION
Status: DISCONTINUED | OUTPATIENT
Start: 2021-01-01 | End: 2021-01-01 | Stop reason: HOSPADM

## 2021-01-01 RX ORDER — CEFAZOLIN SODIUM 2 G/100ML
INJECTION, SOLUTION INTRAVENOUS
Status: DISCONTINUED
Start: 2021-01-01 | End: 2021-01-01 | Stop reason: HOSPADM

## 2021-01-01 RX ORDER — METRONIDAZOLE 500 MG/1
500 TABLET ORAL 3 TIMES DAILY
Status: DISCONTINUED | OUTPATIENT
Start: 2021-01-01 | End: 2021-01-01 | Stop reason: HOSPADM

## 2021-01-01 RX ORDER — PACLITAXEL 100 MG/20ML
125 INJECTION, POWDER, LYOPHILIZED, FOR SUSPENSION INTRAVENOUS ONCE
Status: COMPLETED | OUTPATIENT
Start: 2021-01-01 | End: 2021-01-01

## 2021-01-01 RX ORDER — HEPARIN SODIUM (PORCINE) LOCK FLUSH IV SOLN 100 UNIT/ML 100 UNIT/ML
5 SOLUTION INTRAVENOUS
Status: CANCELLED | OUTPATIENT
Start: 2022-06-05

## 2021-01-01 RX ORDER — IOPAMIDOL 755 MG/ML
115 INJECTION, SOLUTION INTRAVASCULAR ONCE
Status: COMPLETED | OUTPATIENT
Start: 2021-01-01 | End: 2021-01-01

## 2021-01-01 RX ORDER — NALOXONE HYDROCHLORIDE 0.4 MG/ML
0.2 INJECTION, SOLUTION INTRAMUSCULAR; INTRAVENOUS; SUBCUTANEOUS
Status: DISCONTINUED | OUTPATIENT
Start: 2021-01-01 | End: 2021-01-01 | Stop reason: HOSPADM

## 2021-01-01 RX ORDER — FENTANYL CITRATE 50 UG/ML
INJECTION, SOLUTION INTRAMUSCULAR; INTRAVENOUS PRN
Status: COMPLETED | OUTPATIENT
Start: 2021-01-01 | End: 2021-01-01

## 2021-01-01 RX ORDER — CIPROFLOXACIN 500 MG/1
500 TABLET, FILM COATED ORAL EVERY 12 HOURS SCHEDULED
Status: DISCONTINUED | OUTPATIENT
Start: 2021-01-01 | End: 2021-01-01 | Stop reason: HOSPADM

## 2021-01-01 RX ORDER — IOPAMIDOL 755 MG/ML
120 INJECTION, SOLUTION INTRAVASCULAR ONCE
Status: COMPLETED | OUTPATIENT
Start: 2021-01-01 | End: 2021-01-01

## 2021-01-01 RX ORDER — HEPARIN SODIUM (PORCINE) LOCK FLUSH IV SOLN 100 UNIT/ML 100 UNIT/ML
500 SOLUTION INTRAVENOUS
Status: DISCONTINUED | OUTPATIENT
Start: 2021-01-01 | End: 2021-01-01 | Stop reason: HOSPADM

## 2021-01-01 RX ORDER — OLANZAPINE 2.5 MG/1
TABLET, FILM COATED ORAL
Qty: 30 TABLET | Refills: 1 | Status: SHIPPED | OUTPATIENT
Start: 2021-01-01 | End: 2021-01-01

## 2021-01-01 RX ORDER — OXYCODONE HYDROCHLORIDE 5 MG/1
2.5-5 TABLET ORAL EVERY 4 HOURS PRN
Qty: 15 TABLET | Refills: 0 | Status: SHIPPED | OUTPATIENT
Start: 2021-01-01 | End: 2021-01-01

## 2021-01-01 RX ORDER — HEPARIN SODIUM (PORCINE) LOCK FLUSH IV SOLN 100 UNIT/ML 100 UNIT/ML
5 SOLUTION INTRAVENOUS EVERY 8 HOURS
Status: DISCONTINUED | OUTPATIENT
Start: 2021-01-01 | End: 2021-01-01 | Stop reason: HOSPADM

## 2021-01-01 RX ORDER — IOPAMIDOL 755 MG/ML
103 INJECTION, SOLUTION INTRAVASCULAR ONCE
Status: COMPLETED | OUTPATIENT
Start: 2021-01-01 | End: 2021-01-01

## 2021-01-01 RX ORDER — SENNOSIDES 8.6 MG
2 TABLET ORAL AT BEDTIME
Qty: 60 TABLET | Refills: 0 | Status: SHIPPED | OUTPATIENT
Start: 2021-01-01

## 2021-01-01 RX ORDER — ALBUTEROL SULFATE 0.83 MG/ML
2.5 SOLUTION RESPIRATORY (INHALATION)
Status: CANCELLED | OUTPATIENT
Start: 2022-06-05

## 2021-01-01 RX ORDER — ATROPINE SULFATE 0.4 MG/ML
0.4 AMPUL (ML) INJECTION
Status: COMPLETED | OUTPATIENT
Start: 2021-01-01 | End: 2021-01-01

## 2021-01-01 RX ORDER — LOPERAMIDE HCL 2 MG
CAPSULE ORAL
Qty: 60 CAPSULE | Refills: 3 | Status: ON HOLD | OUTPATIENT
Start: 2021-01-01 | End: 2021-01-01

## 2021-01-01 RX ORDER — LIDOCAINE 40 MG/G
CREAM TOPICAL
Status: DISCONTINUED | OUTPATIENT
Start: 2021-01-01 | End: 2021-01-01 | Stop reason: HOSPADM

## 2021-01-01 RX ORDER — DEXAMETHASONE 4 MG/1
8 TABLET ORAL DAILY
Qty: 6 TABLET | Refills: 2 | Status: SHIPPED | OUTPATIENT
Start: 2021-01-01 | End: 2021-01-01

## 2021-01-01 RX ORDER — HYDROMORPHONE HYDROCHLORIDE 2 MG/1
1-2 TABLET ORAL EVERY 6 HOURS PRN
Qty: 15 TABLET | Refills: 0 | Status: SHIPPED | OUTPATIENT
Start: 2021-01-01 | End: 2021-01-01

## 2021-01-01 RX ORDER — NALOXONE HYDROCHLORIDE 0.4 MG/ML
0.2 INJECTION, SOLUTION INTRAMUSCULAR; INTRAVENOUS; SUBCUTANEOUS
Status: CANCELLED | OUTPATIENT
Start: 2022-06-05

## 2021-01-01 RX ORDER — HEPARIN SODIUM,PORCINE 10 UNIT/ML
5 VIAL (ML) INTRAVENOUS
Status: CANCELLED | OUTPATIENT
Start: 2022-06-05

## 2021-01-01 RX ORDER — HEPARIN SODIUM (PORCINE) LOCK FLUSH IV SOLN 100 UNIT/ML 100 UNIT/ML
500 SOLUTION INTRAVENOUS ONCE
Status: COMPLETED | OUTPATIENT
Start: 2021-01-01 | End: 2021-01-01

## 2021-01-01 RX ORDER — DIPHENOXYLATE HCL/ATROPINE 2.5-.025MG
1-2 TABLET ORAL 4 TIMES DAILY PRN
Qty: 60 TABLET | Refills: 0 | Status: ON HOLD | OUTPATIENT
Start: 2021-01-01 | End: 2021-01-01

## 2021-01-01 RX ORDER — BENZONATATE 100 MG/1
100-200 CAPSULE ORAL 3 TIMES DAILY PRN
Qty: 30 CAPSULE | Refills: 1 | Status: ON HOLD | OUTPATIENT
Start: 2021-01-01 | End: 2021-01-01

## 2021-01-01 RX ORDER — VALACYCLOVIR HYDROCHLORIDE 1 G/1
TABLET, FILM COATED ORAL
Status: ON HOLD | COMMUNITY
Start: 2020-08-28 | End: 2021-01-01

## 2021-01-01 RX ORDER — HEPARIN SODIUM (PORCINE) LOCK FLUSH IV SOLN 100 UNIT/ML 100 UNIT/ML
5 SOLUTION INTRAVENOUS ONCE
Status: COMPLETED | OUTPATIENT
Start: 2021-01-01 | End: 2021-01-01

## 2021-01-01 RX ORDER — OLANZAPINE 2.5 MG/1
2.5 TABLET, FILM COATED ORAL AT BEDTIME
Status: CANCELLED | OUTPATIENT
Start: 2021-01-01

## 2021-01-01 RX ORDER — IBUPROFEN 200 MG
TABLET ORAL
Status: ON HOLD | COMMUNITY
End: 2021-01-01

## 2021-01-01 RX ORDER — FENTANYL CITRATE 50 UG/ML
25-50 INJECTION, SOLUTION INTRAMUSCULAR; INTRAVENOUS EVERY 5 MIN PRN
Status: DISCONTINUED | OUTPATIENT
Start: 2021-01-01 | End: 2021-01-01 | Stop reason: HOSPADM

## 2021-01-01 RX ORDER — ONDANSETRON 2 MG/ML
4 INJECTION INTRAMUSCULAR; INTRAVENOUS EVERY 6 HOURS PRN
Status: DISCONTINUED | OUTPATIENT
Start: 2021-01-01 | End: 2021-01-01 | Stop reason: HOSPADM

## 2021-01-01 RX ORDER — LORAZEPAM 0.5 MG/1
0.5 TABLET ORAL EVERY 4 HOURS PRN
Qty: 30 TABLET | Refills: 2 | Status: SHIPPED | OUTPATIENT
Start: 2021-01-01 | End: 2021-01-01

## 2021-01-01 RX ORDER — METHOCARBAMOL 750 MG/1
TABLET, FILM COATED ORAL
Status: ON HOLD | COMMUNITY
Start: 2021-01-01 | End: 2021-01-01

## 2021-01-01 RX ORDER — DIPHENHYDRAMINE HYDROCHLORIDE 50 MG/ML
50 INJECTION INTRAMUSCULAR; INTRAVENOUS
Status: CANCELLED
Start: 2022-06-05

## 2021-01-01 RX ORDER — HYDROMORPHONE HYDROCHLORIDE 1 MG/ML
0.3 INJECTION, SOLUTION INTRAMUSCULAR; INTRAVENOUS; SUBCUTANEOUS ONCE
Status: COMPLETED | OUTPATIENT
Start: 2021-01-01 | End: 2021-01-01

## 2021-01-01 RX ORDER — SODIUM CHLORIDE 9 MG/ML
INJECTION, SOLUTION INTRAVENOUS CONTINUOUS PRN
Status: COMPLETED | OUTPATIENT
Start: 2021-01-01 | End: 2021-01-01

## 2021-01-01 RX ORDER — CEFTRIAXONE 1 G/1
1 INJECTION, POWDER, FOR SOLUTION INTRAMUSCULAR; INTRAVENOUS EVERY 24 HOURS
Status: DISCONTINUED | OUTPATIENT
Start: 2021-01-01 | End: 2021-01-01

## 2021-01-01 RX ORDER — HEPARIN SODIUM (PORCINE) LOCK FLUSH IV SOLN 100 UNIT/ML 100 UNIT/ML
5 SOLUTION INTRAVENOUS DAILY PRN
Status: DISCONTINUED | OUTPATIENT
Start: 2021-01-01 | End: 2021-01-01 | Stop reason: HOSPADM

## 2021-01-01 RX ORDER — LORATADINE 10 MG/1
10 TABLET ORAL DAILY
Status: ON HOLD | COMMUNITY
End: 2021-01-01

## 2021-01-01 RX ORDER — MEPERIDINE HYDROCHLORIDE 25 MG/ML
25 INJECTION INTRAMUSCULAR; INTRAVENOUS; SUBCUTANEOUS EVERY 30 MIN PRN
Status: CANCELLED | OUTPATIENT
Start: 2022-06-05

## 2021-01-01 RX ORDER — SENNOSIDES 8.6 MG
8.6 TABLET ORAL AT BEDTIME
Status: DISCONTINUED | OUTPATIENT
Start: 2021-01-01 | End: 2021-01-01 | Stop reason: HOSPADM

## 2021-01-01 RX ORDER — OLANZAPINE 2.5 MG/1
TABLET, FILM COATED ORAL
Qty: 30 TABLET | Refills: 1 | OUTPATIENT
Start: 2021-01-01

## 2021-01-01 RX ORDER — LOPERAMIDE HCL 2 MG
CAPSULE ORAL
Qty: 30 CAPSULE | Refills: 0 | Status: SHIPPED | OUTPATIENT
Start: 2021-01-01 | End: 2021-01-01

## 2021-01-01 RX ORDER — METRONIDAZOLE 500 MG/1
500 TABLET ORAL 3 TIMES DAILY
Qty: 32 TABLET | Refills: 0 | Status: SHIPPED | OUTPATIENT
Start: 2021-01-01 | End: 2021-01-01

## 2021-01-01 RX ORDER — OLANZAPINE 2.5 MG/1
2.5 TABLET, FILM COATED ORAL AT BEDTIME
Qty: 30 TABLET | Refills: 3 | Status: SHIPPED | OUTPATIENT
Start: 2021-01-01

## 2021-01-01 RX ORDER — ONDANSETRON 8 MG/1
8 TABLET, ORALLY DISINTEGRATING ORAL EVERY 8 HOURS PRN
Qty: 30 TABLET | Refills: 3 | Status: SHIPPED | OUTPATIENT
Start: 2021-01-01

## 2021-01-01 RX ORDER — LIDOCAINE HYDROCHLORIDE 10 MG/ML
INJECTION, SOLUTION EPIDURAL; INFILTRATION; INTRACAUDAL; PERINEURAL
Status: DISCONTINUED
Start: 2021-01-01 | End: 2021-01-01 | Stop reason: HOSPADM

## 2021-01-01 RX ORDER — METRONIDAZOLE 250 MG/1
250 TABLET ORAL 3 TIMES DAILY
Status: DISCONTINUED | OUTPATIENT
Start: 2021-01-01 | End: 2021-01-01

## 2021-01-01 RX ORDER — IOPAMIDOL 755 MG/ML
190 INJECTION, SOLUTION INTRAVASCULAR ONCE
Status: COMPLETED | OUTPATIENT
Start: 2021-01-01 | End: 2021-01-01

## 2021-01-01 RX ORDER — CEFAZOLIN SODIUM 2 G/100ML
2 INJECTION, SOLUTION INTRAVENOUS
Status: COMPLETED | OUTPATIENT
Start: 2021-01-01 | End: 2021-01-01

## 2021-01-01 RX ORDER — PROCHLORPERAZINE MALEATE 10 MG
10 TABLET ORAL EVERY 6 HOURS PRN
Qty: 30 TABLET | Refills: 2 | Status: ON HOLD | OUTPATIENT
Start: 2021-01-01 | End: 2021-01-01

## 2021-01-01 RX ORDER — ACETAMINOPHEN 325 MG/1
975 TABLET ORAL EVERY 8 HOURS
Status: DISCONTINUED | OUTPATIENT
Start: 2021-01-01 | End: 2021-01-01 | Stop reason: HOSPADM

## 2021-01-01 RX ORDER — LIDOCAINE 4 G/G
2 PATCH TOPICAL
Status: DISCONTINUED | OUTPATIENT
Start: 2021-01-01 | End: 2021-01-01 | Stop reason: HOSPADM

## 2021-01-01 RX ORDER — TRAZODONE HYDROCHLORIDE 50 MG/1
50 TABLET, FILM COATED ORAL
Qty: 30 TABLET | Refills: 0 | Status: SHIPPED | OUTPATIENT
Start: 2021-01-01 | End: 2022-01-03

## 2021-01-01 RX ORDER — FENTANYL CITRATE 50 UG/ML
INJECTION, SOLUTION INTRAMUSCULAR; INTRAVENOUS
Status: DISCONTINUED
Start: 2021-01-01 | End: 2021-01-01 | Stop reason: HOSPADM

## 2021-01-01 RX ORDER — METHYLPREDNISOLONE SODIUM SUCCINATE 125 MG/2ML
125 INJECTION, POWDER, LYOPHILIZED, FOR SOLUTION INTRAMUSCULAR; INTRAVENOUS
Status: CANCELLED
Start: 2022-06-05

## 2021-01-01 RX ORDER — HYDROMORPHONE HYDROCHLORIDE 1 MG/ML
0.5 INJECTION, SOLUTION INTRAMUSCULAR; INTRAVENOUS; SUBCUTANEOUS ONCE
Status: COMPLETED | OUTPATIENT
Start: 2021-01-01 | End: 2021-01-01

## 2021-01-01 RX ORDER — LIDOCAINE HYDROCHLORIDE 20 MG/ML
10 SOLUTION OROPHARYNGEAL EVERY 6 HOURS PRN
Qty: 250 ML | Refills: 1 | Status: ON HOLD | OUTPATIENT
Start: 2021-01-01 | End: 2021-01-01

## 2021-01-01 RX ORDER — PANTOPRAZOLE SODIUM 40 MG/1
40 TABLET, DELAYED RELEASE ORAL 2 TIMES DAILY
Qty: 60 TABLET | Refills: 0 | Status: SHIPPED | OUTPATIENT
Start: 2021-01-01

## 2021-01-01 RX ORDER — OXYCODONE HYDROCHLORIDE 5 MG/1
2.5-5 TABLET ORAL EVERY 4 HOURS PRN
Qty: 84 TABLET | Refills: 0 | Status: SHIPPED | OUTPATIENT
Start: 2021-01-01

## 2021-01-01 RX ORDER — OLANZAPINE 2.5 MG/1
2.5 TABLET, FILM COATED ORAL AT BEDTIME
Qty: 30 TABLET | Refills: 1 | Status: SHIPPED | OUTPATIENT
Start: 2021-01-01 | End: 2021-01-01

## 2021-01-01 RX ORDER — HYDROMORPHONE HYDROCHLORIDE 2 MG/1
1-2 TABLET ORAL EVERY 6 HOURS PRN
Qty: 56 TABLET | Refills: 0 | Status: SHIPPED | OUTPATIENT
Start: 2021-01-01 | End: 2021-01-01

## 2021-01-01 RX ORDER — OXYCODONE HYDROCHLORIDE 5 MG/1
5 TABLET ORAL EVERY 4 HOURS PRN
Status: DISCONTINUED | OUTPATIENT
Start: 2021-01-01 | End: 2021-01-01

## 2021-01-01 RX ORDER — FLUMAZENIL 0.1 MG/ML
0.2 INJECTION, SOLUTION INTRAVENOUS
Status: DISCONTINUED | OUTPATIENT
Start: 2021-01-01 | End: 2021-01-01 | Stop reason: HOSPADM

## 2021-01-01 RX ORDER — TRAZODONE HYDROCHLORIDE 50 MG/1
50 TABLET, FILM COATED ORAL
Status: DISCONTINUED | OUTPATIENT
Start: 2021-01-01 | End: 2021-01-01 | Stop reason: HOSPADM

## 2021-01-01 RX ORDER — HEPARIN SODIUM (PORCINE) LOCK FLUSH IV SOLN 100 UNIT/ML 100 UNIT/ML
SOLUTION INTRAVENOUS
Status: COMPLETED
Start: 2021-01-01 | End: 2021-01-01

## 2021-01-01 RX ADMIN — ACETAMINOPHEN 975 MG: 325 TABLET, FILM COATED ORAL at 16:03

## 2021-01-01 RX ADMIN — Medication 5 ML: at 08:37

## 2021-01-01 RX ADMIN — Medication 1000 ML: at 15:32

## 2021-01-01 RX ADMIN — DEXTROSE MONOHYDRATE 320 MG: 50 INJECTION, SOLUTION INTRAVENOUS at 11:52

## 2021-01-01 RX ADMIN — PANTOPRAZOLE SODIUM 40 MG: 40 INJECTION, POWDER, FOR SOLUTION INTRAVENOUS at 08:07

## 2021-01-01 RX ADMIN — FENTANYL CITRATE 25 MCG: 50 INJECTION, SOLUTION INTRAMUSCULAR; INTRAVENOUS at 14:43

## 2021-01-01 RX ADMIN — METRONIDAZOLE 500 MG: 500 TABLET ORAL at 13:30

## 2021-01-01 RX ADMIN — Medication: at 04:43

## 2021-01-01 RX ADMIN — DEXTROSE MONOHYDRATE 320 MG: 50 INJECTION, SOLUTION INTRAVENOUS at 11:11

## 2021-01-01 RX ADMIN — CIPROFLOXACIN HYDROCHLORIDE 500 MG: 500 TABLET, FILM COATED ORAL at 08:59

## 2021-01-01 RX ADMIN — Medication 5 ML: at 06:56

## 2021-01-01 RX ADMIN — HEPARIN SODIUM (PORCINE) LOCK FLUSH IV SOLN 100 UNIT/ML 5 ML: 100 SOLUTION at 08:34

## 2021-01-01 RX ADMIN — PANCRELIPASE LIPASE, PANCRELIPASE PROTEASE, PANCRELIPASE AMYLASE 2 CAPSULE: 20000; 63000; 84000 CAPSULE, DELAYED RELEASE ORAL at 06:21

## 2021-01-01 RX ADMIN — ACETAMINOPHEN 975 MG: 325 TABLET, FILM COATED ORAL at 00:13

## 2021-01-01 RX ADMIN — PACLITAXEL 260 MG: 100 INJECTION, POWDER, LYOPHILIZED, FOR SUSPENSION INTRAVENOUS at 10:19

## 2021-01-01 RX ADMIN — Medication 2.5 MG: at 13:34

## 2021-01-01 RX ADMIN — DEXAMETHASONE SODIUM PHOSPHATE 12 MG: 10 INJECTION, SOLUTION INTRAMUSCULAR; INTRAVENOUS at 08:20

## 2021-01-01 RX ADMIN — FENTANYL CITRATE 50 MCG: 50 INJECTION, SOLUTION INTRAMUSCULAR; INTRAVENOUS at 14:29

## 2021-01-01 RX ADMIN — FENTANYL CITRATE 50 MCG: 50 INJECTION, SOLUTION INTRAMUSCULAR; INTRAVENOUS at 10:21

## 2021-01-01 RX ADMIN — PANTOPRAZOLE SODIUM 40 MG: 40 INJECTION, POWDER, FOR SOLUTION INTRAVENOUS at 08:58

## 2021-01-01 RX ADMIN — ACETAMINOPHEN 650 MG: 325 TABLET, FILM COATED ORAL at 03:32

## 2021-01-01 RX ADMIN — PACLITAXEL 260 MG: 100 INJECTION, POWDER, LYOPHILIZED, FOR SUSPENSION INTRAVENOUS at 09:43

## 2021-01-01 RX ADMIN — FENTANYL CITRATE 50 MCG: 50 INJECTION, SOLUTION INTRAMUSCULAR; INTRAVENOUS at 10:17

## 2021-01-01 RX ADMIN — HEPARIN SODIUM (PORCINE) LOCK FLUSH IV SOLN 100 UNIT/ML 5 ML: 100 SOLUTION at 07:50

## 2021-01-01 RX ADMIN — ACETAMINOPHEN 975 MG: 325 TABLET, FILM COATED ORAL at 08:58

## 2021-01-01 RX ADMIN — PANCRELIPASE LIPASE, PANCRELIPASE PROTEASE, PANCRELIPASE AMYLASE 4 CAPSULE: 20000; 63000; 84000 CAPSULE, DELAYED RELEASE ORAL at 17:59

## 2021-01-01 RX ADMIN — Medication 5 ML: at 07:44

## 2021-01-01 RX ADMIN — PALONOSETRON 0.25 MG: 0.05 INJECTION, SOLUTION INTRAVENOUS at 09:02

## 2021-01-01 RX ADMIN — HYDROMORPHONE HYDROCHLORIDE 0.3 MG: 1 INJECTION, SOLUTION INTRAMUSCULAR; INTRAVENOUS; SUBCUTANEOUS at 11:48

## 2021-01-01 RX ADMIN — PALONOSETRON 0.25 MG: 0.05 INJECTION, SOLUTION INTRAVENOUS at 09:09

## 2021-01-01 RX ADMIN — PALONOSETRON 0.25 MG: 0.05 INJECTION, SOLUTION INTRAVENOUS at 08:34

## 2021-01-01 RX ADMIN — SODIUM CHLORIDE 8 MG/HR: 9 INJECTION, SOLUTION INTRAVENOUS at 18:37

## 2021-01-01 RX ADMIN — HEPARIN SODIUM (PORCINE) LOCK FLUSH IV SOLN 100 UNIT/ML 5 ML: 100 SOLUTION at 08:15

## 2021-01-01 RX ADMIN — ACETAMINOPHEN 650 MG: 325 TABLET, FILM COATED ORAL at 00:19

## 2021-01-01 RX ADMIN — SODIUM CHLORIDE 250 ML: 9 INJECTION, SOLUTION INTRAVENOUS at 09:50

## 2021-01-01 RX ADMIN — OXALIPLATIN 200 MG: 100 INJECTION, SOLUTION, CONCENTRATE INTRAVENOUS at 09:41

## 2021-01-01 RX ADMIN — PIPERACILLIN AND TAZOBACTAM 3.38 G: 3; .375 INJECTION, POWDER, LYOPHILIZED, FOR SOLUTION INTRAVENOUS at 20:20

## 2021-01-01 RX ADMIN — HEPARIN SODIUM (PORCINE) LOCK FLUSH IV SOLN 100 UNIT/ML 5 ML: 100 SOLUTION at 07:57

## 2021-01-01 RX ADMIN — CEFAZOLIN SODIUM 2 G: 2 INJECTION, SOLUTION INTRAVENOUS at 10:12

## 2021-01-01 RX ADMIN — HEPARIN 500 UNITS: 100 SYRINGE at 10:24

## 2021-01-01 RX ADMIN — SODIUM CHLORIDE 8 MG/HR: 9 INJECTION, SOLUTION INTRAVENOUS at 16:45

## 2021-01-01 RX ADMIN — PACLITAXEL 260 MG: 100 INJECTION, POWDER, LYOPHILIZED, FOR SUSPENSION INTRAVENOUS at 10:21

## 2021-01-01 RX ADMIN — Medication 5 ML: at 11:30

## 2021-01-01 RX ADMIN — Medication: at 09:21

## 2021-01-01 RX ADMIN — Medication 0.4 MG: at 12:46

## 2021-01-01 RX ADMIN — PIPERACILLIN AND TAZOBACTAM 3.38 G: 3; .375 INJECTION, POWDER, LYOPHILIZED, FOR SOLUTION INTRAVENOUS at 14:22

## 2021-01-01 RX ADMIN — IOPAMIDOL 119 ML: 755 INJECTION, SOLUTION INTRAVASCULAR at 09:33

## 2021-01-01 RX ADMIN — FENTANYL CITRATE 50 MCG: 50 INJECTION, SOLUTION INTRAMUSCULAR; INTRAVENOUS at 14:33

## 2021-01-01 RX ADMIN — DEXAMETHASONE SODIUM PHOSPHATE: 10 INJECTION, SOLUTION INTRAMUSCULAR; INTRAVENOUS at 09:05

## 2021-01-01 RX ADMIN — ACETAMINOPHEN 650 MG: 325 TABLET, FILM COATED ORAL at 20:07

## 2021-01-01 RX ADMIN — OXALIPLATIN 200 MG: 5 INJECTION, SOLUTION INTRAVENOUS at 10:07

## 2021-01-01 RX ADMIN — HEPARIN SODIUM (PORCINE) LOCK FLUSH IV SOLN 100 UNIT/ML 500 UNITS: 100 SOLUTION at 10:58

## 2021-01-01 RX ADMIN — HEPARIN SODIUM (PORCINE) LOCK FLUSH IV SOLN 100 UNIT/ML 5 ML: 100 SOLUTION at 09:00

## 2021-01-01 RX ADMIN — HEPARIN SODIUM (PORCINE) LOCK FLUSH IV SOLN 100 UNIT/ML 500 UNITS: 100 SOLUTION at 16:42

## 2021-01-01 RX ADMIN — PANCRELIPASE LIPASE, PANCRELIPASE PROTEASE, PANCRELIPASE AMYLASE 3 CAPSULE: 20000; 63000; 84000 CAPSULE, DELAYED RELEASE ORAL at 16:52

## 2021-01-01 RX ADMIN — Medication 2.5 MG: at 14:21

## 2021-01-01 RX ADMIN — DEXTROSE MONOHYDRATE 250 ML: 50 INJECTION, SOLUTION INTRAVENOUS at 08:14

## 2021-01-01 RX ADMIN — LIDOCAINE HYDROCHLORIDE 20 ML: 10 INJECTION, SOLUTION EPIDURAL; INFILTRATION; INTRACAUDAL; PERINEURAL at 10:23

## 2021-01-01 RX ADMIN — PANTOPRAZOLE SODIUM 40 MG: 40 INJECTION, POWDER, FOR SOLUTION INTRAVENOUS at 09:29

## 2021-01-01 RX ADMIN — Medication 5 ML: at 07:00

## 2021-01-01 RX ADMIN — ATROPINE SULFATE 0.4 MG: 0.4 INJECTION INTRAMUSCULAR; INTRAVENOUS; SUBCUTANEOUS at 11:45

## 2021-01-01 RX ADMIN — Medication: at 07:55

## 2021-01-01 RX ADMIN — PALONOSETRON HYDROCHLORIDE 0.25 MG: 0.25 INJECTION, SOLUTION INTRAVENOUS at 08:17

## 2021-01-01 RX ADMIN — HYDROMORPHONE HYDROCHLORIDE 0.5 MG: 1 INJECTION, SOLUTION INTRAMUSCULAR; INTRAVENOUS; SUBCUTANEOUS at 11:00

## 2021-01-01 RX ADMIN — TRAZODONE HYDROCHLORIDE 50 MG: 50 TABLET ORAL at 00:13

## 2021-01-01 RX ADMIN — Medication 2.5 MG: at 15:07

## 2021-01-01 RX ADMIN — ACETAMINOPHEN 650 MG: 325 TABLET, FILM COATED ORAL at 08:24

## 2021-01-01 RX ADMIN — PACLITAXEL 260 MG: 100 INJECTION, POWDER, LYOPHILIZED, FOR SUSPENSION INTRAVENOUS at 09:56

## 2021-01-01 RX ADMIN — Medication 0.4 MG: at 11:43

## 2021-01-01 RX ADMIN — METRONIDAZOLE 250 MG: 250 TABLET ORAL at 20:09

## 2021-01-01 RX ADMIN — DEXAMETHASONE SODIUM PHOSPHATE 12 MG: 10 INJECTION, SOLUTION INTRAMUSCULAR; INTRAVENOUS at 09:50

## 2021-01-01 RX ADMIN — Medication 250 ML: at 08:51

## 2021-01-01 RX ADMIN — ATROPINE SULFATE 0.4 MG: 0.4 INJECTION INTRAMUSCULAR; INTRAVENOUS; SUBCUTANEOUS at 13:41

## 2021-01-01 RX ADMIN — PANCRELIPASE LIPASE, PANCRELIPASE PROTEASE, PANCRELIPASE AMYLASE 4 CAPSULE: 20000; 63000; 84000 CAPSULE, DELAYED RELEASE ORAL at 08:58

## 2021-01-01 RX ADMIN — LIDOCAINE 2 PATCH: 560 PATCH PERCUTANEOUS; TOPICAL; TRANSDERMAL at 08:57

## 2021-01-01 RX ADMIN — GEMCITABINE 2000 MG: 38 INJECTION, SOLUTION INTRAVENOUS at 10:59

## 2021-01-01 RX ADMIN — MIDAZOLAM 2 MG: 1 INJECTION INTRAMUSCULAR; INTRAVENOUS at 10:17

## 2021-01-01 RX ADMIN — DEXAMETHASONE SODIUM PHOSPHATE: 10 INJECTION, SOLUTION INTRAMUSCULAR; INTRAVENOUS at 09:28

## 2021-01-01 RX ADMIN — OCTREOTIDE ACETATE 50 MCG/HR: 200 INJECTION, SOLUTION INTRAVENOUS; SUBCUTANEOUS at 22:33

## 2021-01-01 RX ADMIN — PIPERACILLIN AND TAZOBACTAM 3.38 G: 3; .375 INJECTION, POWDER, LYOPHILIZED, FOR SOLUTION INTRAVENOUS at 09:29

## 2021-01-01 RX ADMIN — IOPAMIDOL 120 ML: 755 INJECTION, SOLUTION INTRAVASCULAR at 09:30

## 2021-01-01 RX ADMIN — Medication: at 01:26

## 2021-01-01 RX ADMIN — Medication 250 ML: at 09:45

## 2021-01-01 RX ADMIN — LIDOCAINE 2 PATCH: 560 PATCH PERCUTANEOUS; TOPICAL; TRANSDERMAL at 11:01

## 2021-01-01 RX ADMIN — PIPERACILLIN AND TAZOBACTAM 3.38 G: 3; .375 INJECTION, POWDER, LYOPHILIZED, FOR SOLUTION INTRAVENOUS at 02:11

## 2021-01-01 RX ADMIN — HEPARIN SODIUM (PORCINE) LOCK FLUSH IV SOLN 100 UNIT/ML 5 ML: 100 SOLUTION at 09:35

## 2021-01-01 RX ADMIN — TRAZODONE HYDROCHLORIDE 50 MG: 50 TABLET ORAL at 21:13

## 2021-01-01 RX ADMIN — ACETAMINOPHEN 650 MG: 325 TABLET, FILM COATED ORAL at 04:39

## 2021-01-01 RX ADMIN — ACETAMINOPHEN 650 MG: 325 TABLET, FILM COATED ORAL at 15:50

## 2021-01-01 RX ADMIN — Medication 5 ML: at 08:45

## 2021-01-01 RX ADMIN — MIDAZOLAM 1 MG: 1 INJECTION INTRAMUSCULAR; INTRAVENOUS at 14:29

## 2021-01-01 RX ADMIN — Medication: at 00:19

## 2021-01-01 RX ADMIN — HEPARIN SODIUM (PORCINE) LOCK FLUSH IV SOLN 100 UNIT/ML 5 ML: 100 SOLUTION at 07:49

## 2021-01-01 RX ADMIN — PALONOSETRON HYDROCHLORIDE 0.25 MG: 0.25 INJECTION, SOLUTION INTRAVENOUS at 09:23

## 2021-01-01 RX ADMIN — ACETAMINOPHEN 650 MG: 325 TABLET, FILM COATED ORAL at 00:25

## 2021-01-01 RX ADMIN — DEXTROSE MONOHYDRATE 250 ML: 50 INJECTION, SOLUTION INTRAVENOUS at 09:04

## 2021-01-01 RX ADMIN — ATROPINE SULFATE 0.4 MG: 0.4 INJECTION INTRAMUSCULAR; INTRAVENOUS; SUBCUTANEOUS at 12:08

## 2021-01-01 RX ADMIN — HEPARIN SODIUM (PORCINE) LOCK FLUSH IV SOLN 100 UNIT/ML 5 ML: 100 SOLUTION at 09:45

## 2021-01-01 RX ADMIN — CIPROFLOXACIN HYDROCHLORIDE 500 MG: 500 TABLET, FILM COATED ORAL at 20:09

## 2021-01-01 RX ADMIN — Medication 2.5 MG: at 05:47

## 2021-01-01 RX ADMIN — GEMCITABINE 2000 MG: 38 INJECTION, SOLUTION INTRAVENOUS at 11:19

## 2021-01-01 RX ADMIN — PANTOPRAZOLE SODIUM 40 MG: 40 INJECTION, POWDER, FOR SOLUTION INTRAVENOUS at 20:20

## 2021-01-01 RX ADMIN — PANCRELIPASE LIPASE, PANCRELIPASE PROTEASE, PANCRELIPASE AMYLASE 4 CAPSULE: 20000; 63000; 84000 CAPSULE, DELAYED RELEASE ORAL at 10:17

## 2021-01-01 RX ADMIN — MIDAZOLAM 1 MG: 1 INJECTION INTRAMUSCULAR; INTRAVENOUS at 14:43

## 2021-01-01 RX ADMIN — ATROPINE SULFATE 0.4 MG: 0.4 INJECTION INTRAMUSCULAR; INTRAVENOUS; SUBCUTANEOUS at 12:24

## 2021-01-01 RX ADMIN — CEFTRIAXONE SODIUM 1 G: 1 INJECTION, POWDER, FOR SOLUTION INTRAMUSCULAR; INTRAVENOUS at 20:10

## 2021-01-01 RX ADMIN — SODIUM CHLORIDE 500 ML: 0.9 INJECTION, SOLUTION INTRAVENOUS at 14:45

## 2021-01-01 RX ADMIN — Medication 250 ML: at 09:17

## 2021-01-01 RX ADMIN — OXALIPLATIN 200 MG: 5 INJECTION, SOLUTION INTRAVENOUS at 09:00

## 2021-01-01 RX ADMIN — PANTOPRAZOLE SODIUM 40 MG: 40 INJECTION, POWDER, FOR SOLUTION INTRAVENOUS at 20:09

## 2021-01-01 RX ADMIN — Medication 5 ML: at 11:52

## 2021-01-01 RX ADMIN — DEXAMETHASONE SODIUM PHOSPHATE 12 MG: 10 INJECTION, SOLUTION INTRAMUSCULAR; INTRAVENOUS at 10:01

## 2021-01-01 RX ADMIN — PANTOPRAZOLE SODIUM 40 MG: 40 INJECTION, POWDER, FOR SOLUTION INTRAVENOUS at 07:58

## 2021-01-01 RX ADMIN — HEPARIN SODIUM (PORCINE) LOCK FLUSH IV SOLN 100 UNIT/ML 5 ML: 100 SOLUTION at 11:15

## 2021-01-01 RX ADMIN — PANTOPRAZOLE SODIUM 40 MG: 40 INJECTION, POWDER, FOR SOLUTION INTRAVENOUS at 18:21

## 2021-01-01 RX ADMIN — HEPARIN SODIUM (PORCINE) LOCK FLUSH IV SOLN 100 UNIT/ML 500 UNITS: 100 SOLUTION at 08:45

## 2021-01-01 RX ADMIN — ACETAMINOPHEN 650 MG: 325 TABLET, FILM COATED ORAL at 20:05

## 2021-01-01 RX ADMIN — DEXTROSE MONOHYDRATE 250 ML: 50 INJECTION, SOLUTION INTRAVENOUS at 09:18

## 2021-01-01 RX ADMIN — SODIUM CHLORIDE 8 MG/HR: 9 INJECTION, SOLUTION INTRAVENOUS at 05:01

## 2021-01-01 RX ADMIN — PALONOSETRON 0.25 MG: 0.05 INJECTION, SOLUTION INTRAVENOUS at 09:05

## 2021-01-01 RX ADMIN — PACLITAXEL 260 MG: 100 INJECTION, POWDER, LYOPHILIZED, FOR SUSPENSION INTRAVENOUS at 10:36

## 2021-01-01 RX ADMIN — LIDOCAINE 2 PATCH: 560 PATCH PERCUTANEOUS; TOPICAL; TRANSDERMAL at 09:30

## 2021-01-01 RX ADMIN — ACETAMINOPHEN 975 MG: 325 TABLET, FILM COATED ORAL at 16:08

## 2021-01-01 RX ADMIN — PANCRELIPASE LIPASE, PANCRELIPASE PROTEASE, PANCRELIPASE AMYLASE 4 CAPSULE: 20000; 63000; 84000 CAPSULE, DELAYED RELEASE ORAL at 09:42

## 2021-01-01 RX ADMIN — SODIUM CHLORIDE 250 ML: 9 INJECTION, SOLUTION INTRAVENOUS at 09:56

## 2021-01-01 RX ADMIN — IOPAMIDOL 115 ML: 755 INJECTION, SOLUTION INTRAVASCULAR at 09:27

## 2021-01-01 RX ADMIN — METRONIDAZOLE 250 MG: 250 TABLET ORAL at 08:59

## 2021-01-01 RX ADMIN — MIDAZOLAM 1 MG: 1 INJECTION INTRAMUSCULAR; INTRAVENOUS at 10:19

## 2021-01-01 RX ADMIN — ACETAMINOPHEN 650 MG: 325 TABLET, FILM COATED ORAL at 09:21

## 2021-01-01 RX ADMIN — ACETAMINOPHEN 975 MG: 325 TABLET, FILM COATED ORAL at 09:29

## 2021-01-01 RX ADMIN — Medication 0.4 MG: at 11:40

## 2021-01-01 RX ADMIN — DEXTROSE MONOHYDRATE 320 MG: 50 INJECTION, SOLUTION INTRAVENOUS at 12:30

## 2021-01-01 RX ADMIN — TOPICAL ANESTHETIC 1 SPRAY: 200 SPRAY DENTAL; PERIODONTAL at 14:29

## 2021-01-01 RX ADMIN — IOPAMIDOL 103 ML: 755 INJECTION, SOLUTION INTRAVENOUS at 19:57

## 2021-01-01 RX ADMIN — CEFTRIAXONE SODIUM 1 G: 1 INJECTION, POWDER, FOR SOLUTION INTRAMUSCULAR; INTRAVENOUS at 22:31

## 2021-01-01 RX ADMIN — Medication 0.4 MG: at 11:32

## 2021-01-01 RX ADMIN — TRAZODONE HYDROCHLORIDE 50 MG: 50 TABLET ORAL at 01:26

## 2021-01-01 RX ADMIN — HEPARIN SODIUM (PORCINE) LOCK FLUSH IV SOLN 100 UNIT/ML 5 ML: 100 SOLUTION at 11:58

## 2021-01-01 RX ADMIN — HEPARIN SODIUM (PORCINE) LOCK FLUSH IV SOLN 100 UNIT/ML 500 UNITS: 100 SOLUTION at 08:50

## 2021-01-01 RX ADMIN — ACETAMINOPHEN 975 MG: 325 TABLET, FILM COATED ORAL at 00:44

## 2021-01-01 RX ADMIN — PIPERACILLIN AND TAZOBACTAM 3.38 G: 3; .375 INJECTION, POWDER, LYOPHILIZED, FOR SOLUTION INTRAVENOUS at 16:03

## 2021-01-01 RX ADMIN — PANTOPRAZOLE SODIUM 40 MG: 40 INJECTION, POWDER, FOR SOLUTION INTRAVENOUS at 22:30

## 2021-01-01 RX ADMIN — ACETAMINOPHEN 650 MG: 325 TABLET, FILM COATED ORAL at 06:28

## 2021-01-01 RX ADMIN — MIDAZOLAM 1 MG: 1 INJECTION INTRAMUSCULAR; INTRAVENOUS at 14:34

## 2021-01-01 RX ADMIN — PANCRELIPASE LIPASE, PANCRELIPASE PROTEASE, PANCRELIPASE AMYLASE 3 CAPSULE: 20000; 63000; 84000 CAPSULE, DELAYED RELEASE ORAL at 13:30

## 2021-01-01 RX ADMIN — Medication: at 15:50

## 2021-01-01 RX ADMIN — PANCRELIPASE LIPASE, PANCRELIPASE PROTEASE, PANCRELIPASE AMYLASE 3 CAPSULE: 20000; 63000; 84000 CAPSULE, DELAYED RELEASE ORAL at 12:09

## 2021-01-01 RX ADMIN — HEPARIN SODIUM (PORCINE) LOCK FLUSH IV SOLN 100 UNIT/ML 500 UNITS: 100 SOLUTION at 07:39

## 2021-01-01 RX ADMIN — STANDARDIZED SENNA CONCENTRATE 8.6 MG: 8.6 TABLET ORAL at 21:13

## 2021-01-01 RX ADMIN — OXYCODONE HYDROCHLORIDE 5 MG: 5 TABLET ORAL at 16:03

## 2021-01-01 ASSESSMENT — ACTIVITIES OF DAILY LIVING (ADL)
ADLS_ACUITY_SCORE: 9
ADLS_ACUITY_SCORE: 7
ADLS_ACUITY_SCORE: 9
ADLS_ACUITY_SCORE: 7
ADLS_ACUITY_SCORE: 9
ADLS_ACUITY_SCORE: 7
ADLS_ACUITY_SCORE: 7
ADLS_ACUITY_SCORE: 9
ADLS_ACUITY_SCORE: 5
ADLS_ACUITY_SCORE: 9
ADLS_ACUITY_SCORE: 7
ADLS_ACUITY_SCORE: 9
ADLS_ACUITY_SCORE: 9
ADLS_ACUITY_SCORE: 7
ADLS_ACUITY_SCORE: 9
ADLS_ACUITY_SCORE: 7
ADLS_ACUITY_SCORE: 9
ADLS_ACUITY_SCORE: 7
ADLS_ACUITY_SCORE: 9
ADLS_ACUITY_SCORE: 7
ADLS_ACUITY_SCORE: 7
ADLS_ACUITY_SCORE: 9
ADLS_ACUITY_SCORE: 9
ADLS_ACUITY_SCORE: 8
ADLS_ACUITY_SCORE: 7
ADLS_ACUITY_SCORE: 9
ADLS_ACUITY_SCORE: 7
ADLS_ACUITY_SCORE: 9
ADLS_ACUITY_SCORE: 7
ADLS_ACUITY_SCORE: 7
ADLS_ACUITY_SCORE: 8
ADLS_ACUITY_SCORE: 7
ADLS_ACUITY_SCORE: 9
ADLS_ACUITY_SCORE: 7
ADLS_ACUITY_SCORE: 7
ADLS_ACUITY_SCORE: 9
ADLS_ACUITY_SCORE: 7
ADLS_ACUITY_SCORE: 9
ADLS_ACUITY_SCORE: 7
ADLS_ACUITY_SCORE: 9
ADLS_ACUITY_SCORE: 7
ADLS_ACUITY_SCORE: 9
ADLS_ACUITY_SCORE: 7
ADLS_ACUITY_SCORE: 9
ADLS_ACUITY_SCORE: 9
ADLS_ACUITY_SCORE: 7
ADLS_ACUITY_SCORE: 9
ADLS_ACUITY_SCORE: 7
ADLS_ACUITY_SCORE: 7
ADLS_ACUITY_SCORE: 9
ADLS_ACUITY_SCORE: 5
ADLS_ACUITY_SCORE: 9
ADLS_ACUITY_SCORE: 9
ADLS_ACUITY_SCORE: 7
ADLS_ACUITY_SCORE: 9
ADLS_ACUITY_SCORE: 7
ADLS_ACUITY_SCORE: 9
ADLS_ACUITY_SCORE: 7
ADLS_ACUITY_SCORE: 9
ADLS_ACUITY_SCORE: 9
ADLS_ACUITY_SCORE: 8
ADLS_ACUITY_SCORE: 9
ADLS_ACUITY_SCORE: 8
ADLS_ACUITY_SCORE: 9
ADLS_ACUITY_SCORE: 5
ADLS_ACUITY_SCORE: 7
ADLS_ACUITY_SCORE: 9
ADLS_ACUITY_SCORE: 8
ADLS_ACUITY_SCORE: 9
ADLS_ACUITY_SCORE: 7
ADLS_ACUITY_SCORE: 9
ADLS_ACUITY_SCORE: 7
ADLS_ACUITY_SCORE: 8
ADLS_ACUITY_SCORE: 8
ADLS_ACUITY_SCORE: 9
ADLS_ACUITY_SCORE: 7
ADLS_ACUITY_SCORE: 9
ADLS_ACUITY_SCORE: 9
ADLS_ACUITY_SCORE: 7
ADLS_ACUITY_SCORE: 9
ADLS_ACUITY_SCORE: 7

## 2021-01-01 ASSESSMENT — PAIN SCALES - GENERAL
PAINLEVEL: NO PAIN (0)
PAINLEVEL: NO PAIN (0)
PAINLEVEL: MILD PAIN (2)
PAINLEVEL: NO PAIN (0)
PAINLEVEL: NO PAIN (0)
PAINLEVEL: MILD PAIN (2)
PAINLEVEL: NO PAIN (0)
PAINLEVEL: MILD PAIN (2)
PAINLEVEL: NO PAIN (0)
PAINLEVEL: MILD PAIN (2)
PAINLEVEL: NO PAIN (0)
PAINLEVEL: MILD PAIN (3)
PAINLEVEL: NO PAIN (0)

## 2021-01-01 ASSESSMENT — MIFFLIN-ST. JEOR
SCORE: 1755.19
SCORE: 1678.08
SCORE: 1709.83
SCORE: 1758.37

## 2021-01-01 ASSESSMENT — ENCOUNTER SYMPTOMS
LIGHT-HEADEDNESS: 1
VOMITING: 1
BLOOD IN STOOL: 1
ABDOMINAL PAIN: 1
CHILLS: 1
NAUSEA: 1
FEVER: 0

## 2021-05-28 PROBLEM — C25.2 MALIGNANT NEOPLASM OF TAIL OF PANCREAS (H): Status: ACTIVE | Noted: 2021-01-01

## 2021-05-28 NOTE — PROGRESS NOTES
RN Care Coordination Note  OUTGOING CALL to Markus VLAD Diannashante after clinic visit/consultation appt with Dr. Kemp.  We reviewed Dr. Kemp's plan of care: Abraxane/Gemzar vs. Folfurinox.       Introduced self and role of [facility] Coord[facility]. Provided my contact information, [facility] phone number (which has options to talk with a Nurse available 24/7 - triage and RNCC via this option during business hours).     Reviewed current adjustments in clinic flow due to Covid-19 pandemic including addition of telemedicine visits, visitor restrictions, RNCCs working remotely at varying intervals, and Covid testing.    Reviewed [facility] care team members including midlevel providers in oncology dept and Dr. Kemp's usual clinic hours.     Patient states he is still deciding between treatment options, he would like information to review. Explanation of treatment options sent via Vigo.       Reviewed appropriate use of MyChart, not to be used for symptom reporting.      Answered questions regarding: Patient questions role of surgery in cancer treatment, how long he can expect to review chemotherapy and follow-up plan.      Patient voiced understanding and appreciation of above information and denies any further questions,.      Mitali Will RN, BSN, OCN   RN Care Coordinator   [facility]

## 2021-05-28 NOTE — PROGRESS NOTES
Markus Stallworth is a new patient with a newly diagnosed metastatic pancreatic cancer.    He is a healthy 62-year-old gentleman who has had modest left upper quadrant pain over the last 3 months that led to an ultrasound and then further imaging demonstrating the presence of a large mass in the tail of his pancreas with associated splenic vascular involvement with splenomegaly and liver metastases.  A biopsy of one of his liver lesions shows an adenocarcinoma consistent with pancreatic primary.  Aside from the left upper quadrant discomfort, he really has had no other symptoms.  He is well enough that he is planning on running a triathlon in the near future.  I could elicit no other significant symptoms.    He has no significant past medical history.    His family history is notable for his father, who was a smoker, dying of lung cancer.  There are 2 uncles on his father's side of the family who  of some sort of cancer at advanced age, he does not know the details.  His mother is alive and well but had a breast cancer resected about age 65 and he has a brother who was just diagnosed with prostate cancer at the age of 54.    He is  and lives with his wife.  He works his  for [employer].  He has 4 grown kids in town and 1 son in the [employer] stationed in [place].  He has several grandchildren.  His wife has been through chemotherapy for a breast cancer and is currently well.    He is a lifelong non-smoker and had chewed tobacco for short time while he was in the [employer] many years ago.  He is a rare consumer of alcohol.    GENERAL: Healthy, alert and no distress  EYES: Eyes grossly normal to inspection.  No discharge or erythema, or obvious scleral/conjunctival abnormalities.  RESP: No audible wheeze, cough, or visible cyanosis.  No visible retractions or increased work of breathing.    SKIN: Visible skin clear. No significant rash, abnormal pigmentation or lesions.  NEURO: Cranial  nerves grossly intact.  Mentation and speech appropriate for age.  PSYCH: Mentation appears normal, affect normal/bright, judgement and insight intact, normal speech and appearance well-groomed.    I personally reviewed his CT scan and went over the images with him via the video link.  He has a large mass in the tail of his pancreas with associated vascular congestion of his spleen.  There are number of large lesions in his liver some of which appear to be cysts but others are clearly metastases.  Biopsy of one of the liver lesion shows an adenocarcinoma with appropriate immunohistochemistries for pancreatic cancer.  Further molecular profiling on this outside specimen appears to be pending.    Assessment/plan: Metastatic adenocarcinoma of the pancreas in a patient with a normal performance status.  We still need a little bit of additional lab work as he does not have any current tumor marker or liver enzymes but otherwise I think his work-up is complete at this point.    I had a long talk with him about the nature of his disease, his prognosis and treatment options.  We discussed his goals of care and he wants to be as aggressive as possible minimal understanding incurable nature of this disease expressed hope that he will still be able to be.  I reviewed with him that without active treatment the median survival is only 4 months with few people making it out to a year.  We discussed that with easy single agent regimens we can add a month or 2 to that time and improve the quality of life, with multiagent therapy speaking get the median survival out close to a year with a significant fraction surviving couple years and occasional patients surviving as long as several years.  I reviewed with him the 2 standard frontline regimens of gemcitabine/Abraxane and FOLFIRINOX.  We discussed the general strategy of starting treatment with assessments every couple months as to its benefit with continued therapy so long as his  disease was controlled without excess toxicity we discussed that 2 and oftentimes 3 lines of therapy would be common practice.  Discussed that his choice of each line of therapy would want to discuss research trials.  We discussed how to choose between the 2 frontline regimens and I explained that the toxicities and outcomes are roughly equivalent with the FOLFIRINOX perhaps being slightly more active but also more toxic.  We discussed that he would be eligible for a large multinational trial trying to address if 1 regimen is better for frontline therapy versus the other.    We discussed the pending molecular profile  This likely will not alter our upfront therapy although if he does harbor a BRCA mutation that would make us more inclined to start with platinum-containing therapy but that that was nonessential and we generally prefer to get started on active treatment rather than wait for the results of that profiling.    We discussed a number of ancillary issues.  I recommended he undergo genetic counseling and testing for germline mutations and we will go ahead and get that set up.  At present he does not have any specific symptomatic needs to be managed.  Talked about his psychosocial needs and he feels he does not need additional help now but did express some concern about his wife and encouraged him to keep us apprised if we need to provide some support for her in the future.  He is getting some advice already about dietary alterations and we discussed the value of maintaining a healthy diet but that there was not a need for more restrictive specific dietary interventions at this point.    At the end of this long discussion he had had all his questions answered and expressed a good understanding.  We will be sending him some more detailed information about the 2 specific frontline chemo regimens and he wants to have some discussions and follow-up with his family about which of those he would like to pursue and  if he was willing to pursue the randomized trial that would compare those 2 regimens.  We will go ahead and get him set up for Port-A-Cath as soon as we can arrange that and will make a tentative plan to get started on active treatment next week.    Total time by me today inclusive of th above visit, record and imaging review, coordination of care and documentation was 90 minutes      Addendum: the patient, having reviewed with with his family and thought about his options declines to enroll in the study and would like to begin therapy with FOLFIRINOX as soon as possible.

## 2021-05-28 NOTE — PROGRESS NOTES
RECORDS STATUS - ALL OTHER DIAGNOSIS      RECORDS RECEIVED FROM:    DATE RECEIVED:    NOTES STATUS DETAILS   OFFICE NOTE from referring provider     OFFICE NOTE from medical oncologist     DISCHARGE SUMMARY from hospital     DISCHARGE REPORT from the ER     OPERATIVE REPORT     MEDICATION LIST     CLINICAL TRIAL TREATMENTS TO DATE     LABS     PATHOLOGY REPORTS [facility], Report in CE, Slides received   [employer] 21: [id number]   ANYTHING RELATED TO DIAGNOSIS     GENONOMIC TESTING     TYPE:     IMAGING (NEED IMAGES & REPORT)     CT SCANS Requested 21: Img [facility]    21: [facility]   MRI     MAMMO     ULTRASOUND     PET

## 2021-06-01 NOTE — PROGRESS NOTES
RN Care Coordination Note  Incoming Call:   Received return call from patient. He states he and his wife have reviewed all the information. He would like to move forward with starting Folfurinox as soon as possible. Reviewed expected side effects, treatment schedule with NEELAM visits. We discussed follow-up with response assessment CTs with Dr Kemp about every 2 months. Answered all questions to his stated satisfaction.         Mitali Will RN, BSN, OCN   RN Care Coordinator   [facility]

## 2021-06-01 NOTE — PROGRESS NOTES
Ordering Clinic: [facility] cancer  Procedure: port placement  Arrival date: 6/4/21  Arrival time: 0900  Covid-19 testing requirements explained: y  NPO explained: y                 Does patient have transportation available pre and post procedure?   y  Check-in procedure explained: y  Allergies reviewed: none  Blood thinners: none  Labs: epic  H&P:  epic  Does patient require ?    n  If so, language?      services called to order ?    date requested:    arrival time requested:    Name of individual from  services assisting with scheduling appointment:    Previous sedation:  Last oral intake:    solid: ________  Liquids: _______

## 2021-06-01 NOTE — PROGRESS NOTES
RN Care Coordination Note  Received voicemail from patient stating he has looked over treatment options. He has decided he would not like to do clinical trial. Is leaning more towards Folfurinox.     Placed return call to patient to further discuss. Unable to reach patient. Left detailed message reviewing treatment plan needs to be placed prior to chemo scheduling. With port placement on 6/4/21, would like to start chemo early next week. Asked patient to return call.          Mitali Will RN, BSN, OCN   RN Care Coordinator   [facility]

## 2021-06-04 NOTE — IP AVS SNAPSHOT
After Visit Summary Template Not Found    This Print Group is only intended to be used in the After Visit Summary and can only be used in a report that uses a released After Visit Summary Template.                       MRN:[id number]                      After Visit Summary   6/4/2021    Markus Stallworth    MRN: [id number]           Visit Information        Department      6/4/2021  8:05 AM [facility] Cath Lab          Review of your medicines      UNREVIEWED medicines. Ask your doctor about these medicines       Dose / Directions   Tylenol 325 MG Caps  Generic drug: Acetaminophen      Dose: 325 tablet  325 tablets by Oral or Feeding Tube route as needed  Refills: 0              Protect others around you: Learn how to safely use, store and throw away your medicines at www.disposemymeds.org.       Follow-ups after your visit       Your next 10 appointments already scheduled    Jun 04, 2021 10:00 AM  IR CHEST PORT PLACEMENT > 5 YRS OF AGE with RHIR11, JERMAIN, RH IR RAD  [facility] Interventional Radiology ([facility] ) [address] [facility] [address] [phone]   How do I prepare for my exam? (Food and drink instructions)  Adults and Children over 2 years old: No eating for 8 hours before your procedure. You may have clear liquids up until 2 hours beforehand. These include water, clear tea, black coffee and fruit juice without pulp.  Children under 2 years old:  No eating or formula for 6 hours before your procedure. You may have clear liquids up until 2 hours beforehand. No breast milk for 4 hours before your procedure.    How do I prepare for my exam? (Other instructions)  We will call you to talk about your procedure and answer your questions. We will tell you what time to arrive. We will also ask about any medicines you are taking.  You will need to have a history and physical within 30 days before this procedure.    What should I wear: Please  wear loose clothing, such as a sweat suit or jogging clothes. You will be asked to undress and put on a hospital gown.    How long does the exam take: You should expect to be at the facility for approximately 4 hours    What should I bring: Please bring any scans or X-rays taken at other hospitals, if similar tests were done. Also bring a list of your medicines, including vitamins, minerals and over-the-counter drugs. It is safest to leave personal items at home.    Do I need a :  Yes, you may not drive or take a bus or taxi by yourself. You will need an adult to take you home. It is recommended that a responsible adult remain with you for 6 hours.    What do I need to tell my doctor:  Tell your doctor if:  * You have ever had an allergic reaction to X-ray dye (contrast fluid).  * If there's any chance you are pregnant.    What should I do after the exam:  Rest and do quiet activities for 24 hours. Avoid any heavy activity (lifting, vacuuming, exercise) on the day of the exam.  Do not make any major decisions and ensure you have a responsible adult with you for the remainder of the day.   Do not drive or use dangerous machines or tools for 24 hours.  Eat small, frequent meals to prevent nausea and vomiting.   Drink liquids as directed. Do not drink alcohol or take medicines that make you drowsy.  You should be able to return to your everyday activities the next day.    Who should I call with questions: If you have any questions, please call the Imaging Department where you will have your exam. Directions, parking instructions, and other information are available on our website, [email or web address].     Jun 07, 2021  6:45 AM  [facility]  [facility] ([facility] ) [address] [place] [address] [phone]      Jun 07, 2021  7:30 AM  Infusion 360 with  ONC INFUSION NURSE,  32 ONC  [facility]  [facility] ([facility] ) [address] [facility] [id number] [phone]         Care Instructions       Further instructions from your care team         Going Home after Your Port Is Inserted  _______________________________________    Patient Name: Markus Stallworth  Today's Date: June 4, 2021    The doctor who inserted your port was:  *** at *** (Hospital)      Have your port site and/or neck wound checked on:  *** (date).      Go to:  {IR RAD SURGERY OR INTERVENTIONAL RADIOLOGY:919103871}    Your port may be accessed right away. A nurse needs to flush your port every 30 days or after each use.     When you get home:    You should have an adult with you for the first {I6 OR 24 HOUR:983627556}    No driving or drinking alcohol until tomorrow. You may still have side effects from the medicine you received today (you may feel drowsy, unsteady or forgetful).     You may go back to your regular diet today.     If you take aspirin or Plavix, you may begin taking it again tomorrow. You may restart all other medicines today. Use pain medicine as directed.    Avoid heavy lifting or the overuse of your shoulder for three days.    Caring for the port site and/or neck wound:    Keep the port site clean and dry. Cover the site with plastic before taking a shower. Do this until the site has healed.     Keep the bandage on your port site for three days. Change it if it gets wet or dirty. After three days, you may use Band-Aids until the wound has healed.    For a neck wound, leave the tape on for three days. You may cover it with a Band-Aid for comfort.     If you have oozing or bleeding from the port site or from the cut in your neck:     Put direct pressure on the wound for 5 to 10 minutes with a gauze pad.  If you still have bleeding after 10 minutes, call your doctor.    If you are bleeding a lot and can't control it with direct pressure, call 911.    Call your doctor at   *** if you have:    Bleeding from a wound after 10 minutes of direct pressure.    Swelling in your neck or over your port site.    Signs of infection: a fever over 100 F (37.8 C) under the tongue; the site is red, tender or draining.      Additional Information About Your Visit       MyChart Information    Kigo gives you secure access to your electronic health record. If you see a primary care provider, you can also send messages to your care team and make appointments. If you have questions, please call your primary care clinic.  If you do not have a primary care provider, please call [phone] and they will assist you.       Care EveryWhere ID    This is your Care EveryWhere ID. This could be used by other organizations to access your [facility] medical records  [id number]       Your Vitals Were  Most recent update: 6/4/2021  8:40 AM    Blood Pressure   127/80 (BP Location: Left arm)    Pulse   60    Temperature   97.5  F (36.4  C) (Temporal)    Respirations   16    Pulse Oximetry   99%          Primary Care Provider Office Phone # Fax #    Cem Herrera [phone]      Equal Access to Services    G[facility]Y [facility] : Hadii tami ku hadasho Soben, waaxda luqadaha, qaybta kaalmada flaco, mansi barton . So [facility] [phone].    ATENCIÓN: Si habla español, tiene a yanes disposición servicios gratuitos de asistencia lingüística. [facility] [phone].    We comply with applicable federal and state civil rights laws, including the [place] Human Rights Act. We do not discriminate on the basis of race, color, creed, Judaism, national origin, marital status, age, disability, sex, sexual orientation, or gender identity.    If you would like an itemization of your charges they will now be available in [facility] 30 days after discharge. To access the itemized statements in Kigo go to billing/billing summary. From there select view account. There will be multiple tabs  showing an overview of your account, detail, payments, and communications. From the communications tab you can see your monthly statements, your itemized statements, and any billing letters generated for your account. If you do not have a [facility] account and need help getting access please contact [employer] at [phone].  If you would prefer to have your itemized statements mailed please contact our automated itemized bill request line at [phone] option  2.       Thank you!    Thank you for choosing [facility] for your care. Our goal is always to provide you with excellent care. Hearing back from our patients is one way we can continue to improve our services. Please take a few minutes to complete the written survey that you may receive in the mail after you visit. If you would like to speak to someone directly about your visit please contact Patient Relations at [phone]. Thank you!              Medication List      ASK your doctor about these medications          Morning Afternoon Evening Bedtime As Needed    Tylenol 325 MG Caps  INSTRUCTIONS: 325 tablets by Oral or Feeding Tube route as needed  Generic drug: Acetaminophen

## 2021-06-04 NOTE — PRE-PROCEDURE
GENERAL PRE-PROCEDURE:   Procedure:  Right IJ port and catheter placement  Date/Time:  6/4/2021 12:36 PM    Verbal consent obtained?: Yes    Written consent obtained?: Yes    Risks and benefits: Risks, benefits and alternatives were discussed    Consent given by:  Patient  Patient states understanding of procedure being performed: Yes    Patient's understanding of procedure matches consent: Yes    Procedure consent matches procedure scheduled: Yes    Expected level of sedation:  Moderate  Appropriately NPO:  Yes  ASA Class:  Class 3- Severe systemic disease, definite functional limitations  Mallampati  :  Grade 3- soft palate visible, posterior pharyngeal wall not visible  Lungs:  Lungs clear with good breath sounds bilaterally  Heart:  Normal heart sounds and rate  History & Physical reviewed:  History and physical reviewed and no updates needed  Statement of review:  I have reviewed the lab findings, diagnostic data, medications, and the plan for sedation

## 2021-06-04 NOTE — PROCEDURES
[facility]    Procedure: Right Int Jug Port and catheter    Date/Time: 6/4/2021 12:37 PM  Performed by: Amrik Hendrickson MD  Authorized by: Amrik Hendrickson MD     UNIVERSAL PROTOCOL   Site Marked: NA  Prior Images Obtained and Reviewed:  Yes  Required items: Required blood products, implants, devices and special equipment available    Patient identity confirmed:  Verbally with patient, arm band, provided demographic data and hospital-assigned identification number  Patient was reevaluated immediately before administering moderate or deep sedation or anesthesia  Confirmation Checklist:  Patient's identity using two indicators, relevant allergies, procedure was appropriate and matched the consent or emergent situation and correct equipment/implants were available  Time out: Immediately prior to the procedure a time out was called    Universal Protocol: the Joint Commission Universal Protocol was followed    Preparation: Patient was prepped and draped in usual sterile fashion    ESBL (mL):  10         ANESTHESIA    Anesthesia: Local infiltration  Local Anesthetic:  Lidocaine 1% without epinephrine      SEDATION    Patient Sedated: Yes    Sedation Type:  Moderate (conscious) sedation  Vital signs: Vital signs monitored during sedation    See dictated procedure note for full details.  Findings: Right Int Jug port and catheter placement.  Tip at the RA/SVC junction.  Heparinized.  Ready for use.    Specimens: none    Complications: None    Condition: Stable    PROCEDURE   Patient Tolerance:  Patient tolerated the procedure well with no immediate complications    Length of time physician/provider present for 1:1 monitoring during sedation: 20

## 2021-06-04 NOTE — IP AVS SNAPSHOT
[facility]  [address] [place]  [facility] [address]  Phone: [phone]                                    After Visit Summary   6/4/2021    Markus Stallworth    MRN: [id number]           After Visit Summary Signature Page    I have received my discharge instructions, and my questions have been answered. I have discussed any challenges I see with this plan with the nurse or doctor.    ..........................................................................................................................................  Patient/Patient Representative Signature      ..........................................................................................................................................  Patient Representative Print Name and Relationship to Patient    ..................................................               ................................................  Date                                   Time    ..........................................................................................................................................  Reviewed by Signature/Title    ...................................................              ..............................................  Date                                               Time          22EPIC Rev 08/18

## 2021-06-04 NOTE — DISCHARGE INSTRUCTIONS
Going Home after Your Port Is Inserted  _______________________________________    Patient Name: Markus Stallworth  Today's Date: June 4, 2021    The doctor who inserted your port was:  *** at *** (Hospital)      Have your port site and/or neck wound checked on:  *** (date).      Go to:  {IR RAD SURGERY OR INTERVENTIONAL RADIOLOGY:670969482}    Your port may be accessed right away. A nurse needs to flush your port every 30 days or after each use.     When you get home:    You should have an adult with you for the first {I6 OR 24 HOUR:420527899}    No driving or drinking alcohol until tomorrow. You may still have side effects from the medicine you received today (you may feel drowsy, unsteady or forgetful).     You may go back to your regular diet today.     If you take aspirin or Plavix, you may begin taking it again tomorrow. You may restart all other medicines today. Use pain medicine as directed.    Avoid heavy lifting or the overuse of your shoulder for three days.    Caring for the port site and/or neck wound:    Keep the port site clean and dry. Cover the site with plastic before taking a shower. Do this until the site has healed.     Keep the bandage on your port site for three days. Change it if it gets wet or dirty. After three days, you may use Band-Aids until the wound has healed.    For a neck wound, leave the tape on for three days. You may cover it with a Band-Aid for comfort.     If you have oozing or bleeding from the port site or from the cut in your neck:     Put direct pressure on the wound for 5 to 10 minutes with a gauze pad.  If you still have bleeding after 10 minutes, call your doctor.    If you are bleeding a lot and can't control it with direct pressure, call 911.    Call your doctor at  *** if you have:    Bleeding from a wound after 10 minutes of direct pressure.    Swelling in your neck or over your port site.    Signs of infection: a fever over 100 F (37.8 C) under the tongue; the site is  red, tender or draining.

## 2021-06-07 NOTE — TELEPHONE ENCOUNTER
Ileana Gaines CPhT  [facility]  Oncology Pharmacy Liaison  [email or web address]  Phone: [phone]  Fax: [phone]

## 2021-06-07 NOTE — NURSING NOTE
Chief Complaint   Patient presents with     Port Draw     Labs drawn via port by RN in lab. VS taken.      Labs drawn via Port accessed using 20g flat needle. Line flushed and Heparin locked. Vital signs taken. Checked into next appointment.       Gaye Castillo RN

## 2021-06-07 NOTE — PATIENT INSTRUCTIONS
June 2021 Sunday Monday Tuesday Wednesday Thursday Friday Saturday             1    PRE-PROCEDURE COVID PCR   1:10 PM   (10 min.)   1, Bk Curbside Covid Md Praful   [facility] [place] 2     3    LAB   2:30 PM   (15 min.)   SPECIMEN MGMT   [facility] 4    Admission   8:05 AM   Amrik Hendrickson MD   [facility] Cath Lab   (Discharge: 6/4/2021)    IR CHEST PORT PLACEMENT >5 YRS  10:00 AM   (90 min.)   [id number]   [facility] Interventional Radiology 5       6     7    LAB CENTRAL   6:45 AM   (15 min.)   [facility]   [facility]    ONC INFUSION 6 HR (360 MIN)   7:30 AM   (360 min.)   [facility] ONC INFUSION NURSE   [facility] 8     9     10     11     12       13     14     15     16     17     18     19       20     21     22    LAB CENTRAL   6:45 AM   (15 min.)   [facility]   [facility]    RETURN   6:45 AM   (45 min.)   Merle Freeman PA-C   [facility]    ONC INFUSION 6 HR (360 MIN)   8:00 AM   (360 min.)   UC ONC INFUSION NURSE   [facility] 23    VIDEO VISIT NEW   8:45 AM   (60 min.)   Penny Acuna RD   [facility] 24     25     26       27     28     29     30                                July 2021 Sunday Monday Tuesday Wednesday Thursday Friday Saturday                       1     2     3       4     5     6     7     8     9     10       11     12     13     14     15     16     17       18     19     20     21     22     23     24       25     26     27     28     29     30    LAB CENTRAL   9:00 AM   (15 min.)   [facility]   [facility]    CT CHEST ABDOMEN PELVIS WWO   9:40 AM   (20 min.)   [facility]   [facility] [place] [address]                      Lab Results:  Recent Results (from the past 12 hour(s))   CBC with platelets differential    Collection Time: 06/07/21  6:49 AM   Result Value Ref Range    WBC 5.9 4.0 - 11.0 10e9/L    RBC Count 4.72 4.4 - 5.9 10e12/L    Hemoglobin 14.1 13.3 - 17.7 g/dL    Hematocrit 41.3 40.0 - 53.0 %    MCV 88 78 - 100 fl    MCH 29.9 26.5 - 33.0 pg    MCHC 34.1 31.5 - 36.5 g/dL    RDW 12.5 10.0 - 15.0 %    Platelet Count 147 (L) 150 - 450 10e9/L    Diff Method Automated Method     % Neutrophils 66.8 %    % Lymphocytes 20.1 %    % Monocytes 11.1 %    % Eosinophils 1.4 %    % Basophils 0.3 %    % Immature Granulocytes 0.3 %    Nucleated RBCs 0 0 /100    Absolute Neutrophil 3.9 1.6 - 8.3 10e9/L    Absolute Lymphocytes 1.2 0.8 - 5.3 10e9/L    Absolute Monocytes 0.7 0.0 - 1.3 10e9/L    Absolute Eosinophils 0.1 0.0 - 0.7 10e9/L    Absolute Basophils 0.0 0.0 - 0.2 10e9/L    Abs Immature Granulocytes 0.0 0 - 0.4 10e9/L    Absolute Nucleated RBC 0.0    Comprehensive metabolic panel    Collection Time: 06/07/21  6:49 AM   Result Value Ref Range    Sodium 141 133 - 144 mmol/L    Potassium 3.9 3.4 - 5.3 mmol/L    Chloride 106 94 - 109 mmol/L    Carbon Dioxide 29 20 - 32 mmol/L    Anion Gap 5 3 - 14 mmol/L    Glucose 86 70 - 99 mg/dL    Urea Nitrogen 12 7 - 30 mg/dL    Creatinine 0.80 0.66 - 1.25 mg/dL    GFR Estimate >90 >60 mL/min/[1.73_m2]    GFR Estimate If Black >90 >60 mL/min/[1.73_m2]    Calcium 8.7 8.5 - 10.1 mg/dL    Bilirubin Total 0.5 0.2 - 1.3 mg/dL    Albumin 3.4 3.4 - 5.0 g/dL    Protein Total 7.0 6.8 - 8.8 g/dL    Alkaline Phosphatase 71 40 - 150 U/L    ALT 23 0 - 70 U/L    AST 23 0 - 45 U/L       [facility] Triage and after hours / weekends / holidays:  [phone]    Please call the triage or after hours line if you experience a temperature greater than or equal to 100.5, shaking chills, have uncontrolled nausea, vomiting and/or diarrhea, dizziness, shortness of breath, chest pain, bleeding, unexplained bruising, or if  you have any other new/concerning symptoms, questions or concerns.      If you are having any concerning symptoms or wish to speak to a provider before your next infusion visit, please call your care coordinator or triage to notify them so we can adequately serve you.     If you need a refill on a narcotic prescription or other medication, please call before your infusion appointment.

## 2021-06-07 NOTE — TELEPHONE ENCOUNTER
PA Initiation    Medication: Lorazepam - Submitted  Insurance Company: [employer] ([facility]) - Phone [phone] Fax [phone]  Pharmacy Filling the Rx:    Filling Pharmacy Phone:    Filling Pharmacy Fax:    Start Date: 6/7/2021        Ileana Gaines CPhT  [facility]  Oncology Pharmacy Liaison  [email or web address]  Phone: [phone]  Fax: [phone]

## 2021-06-07 NOTE — PROGRESS NOTES
Infusion Nursing Note:  Markus Stallworth presents today for Cycle 1 Day 1 Oxaliplatin, Irinotecan and Fluorouracil pump connect.     Patient seen by provider today: No   present during visit today: Not Applicable.    Note: Markus presents to infusion today stating he feels well. He denies any symptoms or concerns today. He rates his chronic pancreatic/ abdominal pain a 2/10, usually takes PRN Tylenol. Denies need for pain intervention while in infusion today. Writer reviewed teaching with patient and printed off chemo care education with patient and wife's verbalized understanding.     Patient is new to the infusion room today and is receiving Oxaliplatin, Irinotecan and Fluorouracil pump for the first time.  Patient oriented to infusion room, location of bathrooms and nutrition stations, and call light.  Verified that patient recieved written chemotherapy information previously.  Verbally reviewed chemotherapy teaching, side effects, take-home medications, and follow-up schedule with patient. Patient instructed to call triage with any questions or if he experiences a temperature >100.5, shaking chills, uncontrolled nausea/vomiting/diarrhea, dizziness, shortness of breath, bleeding not relieved with pressure, or with any other concerns. Please call triage line at [phone] if any changes.     Atropine given x1 for runny nose- towards the end of Irinotecan, denied any abdominal cramps or diarrhea. At the end of Irinotecan infusion, patient stated he felt like he had a lisp and it was hard for him to form words to talk. Vitals stable. Denies any SOB, chest pain or other symptoms. Hot water and blankets given to patient, about 100 ml of NS given via PIV. Likely symptoms from Oxaliplatin infusion earlier in the day- waited about 30 min, patient stated he felt better. Writer advised patient to go to ED if symptoms return or worsens when he gets home, patient verbalized understanding.     Prior to discharge: Port  "is secured in place with tegaderm and flushed with 10cc NS with positive blood return noted.  Continuous home infusion Dosi-Fuser pump connected.    All connectors secured in place and clamps taped open.    Pump started, \"running\" noted on display (CADD): Not Applicable.  Pump Connection double checked with Eva ARZOLA RN.  Patient instructed to call our clinic or [facility] with any questions or concerns at home.  Patient verbalized understanding.    Patient set up for pump disconnect at home with [facility] on Wednesday 6/9/21 at 11:30 am, confirmed with [facility] CATHIE Dacosta.      Intravenous Access:  Implanted Port.    Treatment Conditions:  Lab Results   Component Value Date    HGB 14.1 06/07/2021     Lab Results   Component Value Date    WBC 5.9 06/07/2021      Lab Results   Component Value Date    ANEU 3.9 06/07/2021     Lab Results   Component Value Date     06/07/2021      Lab Results   Component Value Date     06/07/2021                   Lab Results   Component Value Date    POTASSIUM 3.9 06/07/2021           No results found for: MAG         Lab Results   Component Value Date    CR 0.80 06/07/2021                   Lab Results   Component Value Date    CHRISTINA 8.7 06/07/2021                Lab Results   Component Value Date    BILITOTAL 0.5 06/07/2021           Lab Results   Component Value Date    ALBUMIN 3.4 06/07/2021                    Lab Results   Component Value Date    ALT 23 06/07/2021           Lab Results   Component Value Date    AST 23 06/07/2021       Results reviewed, labs MET treatment parameters, ok to proceed with treatment.      Post Infusion Assessment:  Patient tolerated infusion without incident.  Blood return noted pre and post infusion.  Port left in place for chemo pump      Discharge Plan:   Prescription refills given for Ativan, Compazine, Decadron and Irinotecan.  Copy of AVS reviewed with patient and/or family.  Patient will return 6/22 for next NEELAM and " infusion appointment.  Patient discharged in stable condition accompanied by: self.  Departure Mode: Ambulatory.  Face to Face time: 0.      Maira Rosales RN

## 2021-06-09 NOTE — PROGRESS NOTES
Skilled nurse visit in the home, for discontinuation of florouracil. 5160 mg of 5-FU  infused over 46 hours.

## 2021-06-14 NOTE — PROGRESS NOTES
RN Care Coordination Note  Incoming Call:   Received call from patient stating he has had some side effects from first cycle of Folfurinox. Reports intermittent diarrhea and N/V. Starting to improve now.     Patient also reports oncologist from [facility] had ordered [employer]. He was contacted by [employer] rep and they will not be able to run test, insurance will not cover without PA.     RNCC discussed with Dr Kemp. Will order NGS testing in house. NGS panel, Fusion, PDL-1, MSI, BRCA, and PAL-B2. Orders entered and faxed to pathology at [phone]      Outgoing Call:   Placed return call to patient. Unable to reach. Left detailed message informing patient Dr Kemp has ordered NGS testing to be done in house. Reviewed we are not able to do PA for Foundation One since it is ordered by a different provider. Asked patient to return call to discuss other side effects or concerns.     Mitali Will RN, BSN, OCN   RN Care Coordinator   [facility]

## 2021-06-17 NOTE — TELEPHONE ENCOUNTER
"[facility] Triage Telephone Call    Called patient in follow up to my chart message regarding abdominal pain.  Asked for call back to further assess with triage.    Markus called back to discuss pain.     Couple of days last week his \"spleen pain seemed to be gone.\"  The past two days the pain \"feels different and seems a little sharper and feels like it is in slightly different place.\"    Pain today is a zero most of the time, but when he palpates his \"belly\" it can be sharp pain up to 5-6/10.\"  He is not taking anything for pain currently.     He just thought that maybe Dr. Kemp would like to know this.  He would like next visit to be in person with Dr. Kemp in August.    He is losing weight and is worried about this and also is worried about insurance coverage for dietician.     Currently he is eating \"a birds portion for breakfast\" and 4 or 5 saltine cracker for lunch and trying to eat dinner.     He does have nausea but is not taking anything.  He does not feel that Compazine helps with nausea and he has taken the Ativan but \"doesn't like how he feels.\"    Stools - he has been taking Imodium and last was Saturday.  His stool since Saturday was 2 days ago.  He is passing gas that is very foul smelling.      Discussion:  We spent approximately 40 minutes discussing options for eating, assessing symptoms, reviewing medications.  He denies jaundice.       Plan:  Routed message to care team to ask for possible prescription for Zofran and also possible pancreas enzymes.  He keep a journal to discuss with SELVIN Freeman next week at his appt.  I have asked that he call with any additional questions or concerns.    Goldie Che, RN   BSN, HNBC, STAR-T  [facility]      "

## 2021-06-18 NOTE — PROGRESS NOTES
"RN Care Coordination Note  Outgoing Call:   Patient return call. States he is feeling much better today. Reports he was able to eat good meal last night and again today at lunch. Energy is better. He states he does not want to take any more lorazepam. Discussed using zofran for nausea. We also reviewed use of pancreatic enzymes prior to meals.     Patient reports he has had a \"fair amount of headaches after chemo.\" He has been using Tylenol, which worked well. He also reports he has had some trouble sleeping. We reviewed using Melatonin or benadryl.     Answered all questions to patient's stated satisfaction.     Mitali Will RN, BSN, OCN   RN Care Coordinator   [facility]          "

## 2021-06-18 NOTE — PROGRESS NOTES
RN Care Coordination Note  Placed call to patient to review call with triage yesterday. Patient would benefit from zofran and pancreatic enzyme. Unable to reach patient. Asked for return call with preferred pharmacy info. Discussed in detail how to take pancreatic enzyme, 2-3 caps with larger meals and 1-2 caps with snacks and smaller meals.       Mitali Will RN, BSN, OCN   RN Care Coordinator   [facility]

## 2021-06-22 NOTE — TELEPHONE ENCOUNTER
States he is at pharmacy picking up his medication and is wondering if he needs to  his dexamethasone to use as he used it after his first round of chemo. States he is getting the same chemotherapy treatment as the first round. There are 2 refills on his dexamethasone and he should utilize it in the same way that he did after his first round of  chemotherapy. Verbalizes understanding of instructions.

## 2021-06-22 NOTE — PATIENT INSTRUCTIONS
[facility] Triage and after hours / weekends / holidays:  [phone]    Please call the triage or after hours line if you experience a temperature greater than or equal to 100.5, shaking chills, have uncontrolled nausea, vomiting and/or diarrhea, dizziness, shortness of breath, chest pain, bleeding, unexplained bruising, or if you have any other new/concerning symptoms, questions or concerns.      If you are having any concerning symptoms or wish to speak to a provider before your next infusion visit, please call your care coordinator or triage to notify them so we can adequately serve you.     If you need a refill on a narcotic prescription or other medication, please call before your infusion appointment.                 June 2021 Sunday Monday Tuesday Wednesday Thursday Friday Saturday             1    PRE-PROCEDURE COVID PCR   1:10 PM   (10 min.)   1, Bk Curbside Covid Md Praful   [facility] [place] 2     3    LAB   2:30 PM   (15 min.)   SPECIMEN MGMT   [facility] 4    Admission   8:05 AM   Amrik Hendrickson MD   [facility] Cath Lab   (Discharge: 6/4/2021)    IR CHEST PORT PLACEMENT >5 YRS  10:00 AM   (90 min.)   [id number]   [facility] Interventional Radiology 5       6     7    LAB CENTRAL   6:45 AM   (15 min.)   [facility]   [facility]    ONC INFUSION 6 HR (360 MIN)   7:30 AM   (360 min.)    ONC INFUSION NURSE   [facility] 8     9     10     11     12       13     14     15     16     17     18     19       20     21     22    LAB CENTRAL   6:45 AM   (15 min.)   [facility]   [facility]    RETURN   6:45 AM   (45 min.)   Merle Freeman PA-C   [facility]    ONC INFUSION 6 HR (360 MIN)   8:00 AM   (360 min.)    ONC INFUSION NURSE   [facility]  23    VIDEO VISIT NEW   8:45 AM   (60 min.)   Penny Acuna, JAIME   [facility] 24     25     26       27     28     29     30                                July 2021 Sunday Monday Tuesday Wednesday Thursday Friday Saturday                       1     2     3       4     5     6    LAB CENTRAL   7:30 AM   (15 min.)   [facility]   [facility]    ONC INFUSION 6 HR (360 MIN)   8:00 AM   (360 min.)    ONC INFUSION NURSE   [facility] 7     8     9     10       11     12     13     14     15    CANCER REHAB EVAL   7:30 AM   (60 min.)   Varsha Casanova PT   [facility] [place] 16     17       18     19    LAB CENTRAL   7:30 AM   (15 min.)   NURSE ONLY CANCER CENTER   [facility]    INFUSION 6 HR (360 MIN)   8:00 AM   (360 min.)   BAY 6 INFUSION   [facility] 20     21     22     23     24       25     26     27     28     29     30    LAB CENTRAL   9:00 AM   (15 min.)    [facility]   [facility]    CT CHEST ABDOMEN PELVIS WWO   9:40 AM   (20 min.)   [facility]2   [facility] [place] 31                     Lab Results:  Recent Results (from the past 12 hour(s))   CBC with platelets differential    Collection Time: 06/22/21  7:10 AM   Result Value Ref Range    WBC 3.3 (L) 4.0 - 11.0 10e9/L    RBC Count 4.34 (L) 4.4 - 5.9 10e12/L    Hemoglobin 12.9 (L) 13.3 - 17.7 g/dL    Hematocrit 37.6 (L) 40.0 - 53.0 %    MCV 87 78 - 100 fl    MCH 29.7 26.5 - 33.0 pg    MCHC 34.3 31.5 - 36.5 g/dL    RDW 12.7 10.0 - 15.0 %    Platelet Count 198 150 - 450 10e9/L    Diff Method Automated Method     % Neutrophils 57.9 %    % Lymphocytes 25.8 %    % Monocytes 13.6 %    % Eosinophils 1.8 %    % Basophils 0.3 %    % Immature Granulocytes 0.6 %    Nucleated RBCs 0 0 /100     Absolute Neutrophil 1.9 1.6 - 8.3 10e9/L    Absolute Lymphocytes 0.9 0.8 - 5.3 10e9/L    Absolute Monocytes 0.5 0.0 - 1.3 10e9/L    Absolute Eosinophils 0.1 0.0 - 0.7 10e9/L    Absolute Basophils 0.0 0.0 - 0.2 10e9/L    Abs Immature Granulocytes 0.0 0 - 0.4 10e9/L    Absolute Nucleated RBC 0.0    Comprehensive metabolic panel    Collection Time: 06/22/21  7:10 AM   Result Value Ref Range    Sodium 138 133 - 144 mmol/L    Potassium 3.5 3.4 - 5.3 mmol/L    Chloride 103 94 - 109 mmol/L    Carbon Dioxide 30 20 - 32 mmol/L    Anion Gap 5 3 - 14 mmol/L    Glucose 106 (H) 70 - 99 mg/dL    Urea Nitrogen 15 7 - 30 mg/dL    Creatinine 0.91 0.66 - 1.25 mg/dL    GFR Estimate >90 >60 mL/min/[1.73_m2]    GFR Estimate If Black >90 >60 mL/min/[1.73_m2]    Calcium 8.8 8.5 - 10.1 mg/dL    Bilirubin Total 0.4 0.2 - 1.3 mg/dL    Albumin 3.0 (L) 3.4 - 5.0 g/dL    Protein Total 6.7 (L) 6.8 - 8.8 g/dL    Alkaline Phosphatase 93 40 - 150 U/L    ALT 33 0 - 70 U/L    AST 17 0 - 45 U/L   ALT    Collection Time: 06/22/21  8:16 AM   Result Value Ref Range    ALT 27 0 - 70 U/L   AST    Collection Time: 06/22/21  8:16 AM   Result Value Ref Range    AST 25 0 - 45 U/L   Bilirubin  total    Collection Time: 06/22/21  8:16 AM   Result Value Ref Range    Bilirubin Total 0.6 0.2 - 1.3 mg/dL   Creatinine POCT    Collection Time: 06/22/21  8:30 AM   Result Value Ref Range    Creatinine 0.8 0.66 - 1.25 mg/dL    GFR Estimate >90 >60 mL/min/[1.73_m2]    GFR Estimate If Black >90 >60 mL/min/[1.73_m2]

## 2021-06-22 NOTE — PATIENT INSTRUCTIONS
Nausea: We will add in IV Emend today. You should start taking Zyprexa at bedtime on days 3-9.     Diarrhea: If mild, okay to start with Imodium, up to 8/day. If moderate-severe, then try Lomotil. You may take them separately or together if needed.     Fatigue: Recommend daily exercise. Go for short walks on days that you are more fatigued. Also, incorporate some light weights.     Low protein in blood: Recommend increasing protein in diet. Good sources are meat, beans, nuts, and dairy. You can also use protein supplements.

## 2021-06-22 NOTE — PROGRESS NOTES
Oncology/Hematology Visit Note  Jun 22, 2021    Reason for Visit: follow up of metastatic pancreatic cancer    History of Present Illness: Markus Stallworth is a 61 year old male with metastatic pancreatic cancer. He initially presented with modest left upper quadrant pain that led to an ultrasound and then further imaging demonstrating the presence of a large mass in the tail of his pancreas with associated splenic vascular involvement with splenomegaly and liver metastases.  A biopsy of one of his liver lesions shows an adenocarcinoma consistent with pancreatic primary. His port was placed on 6/4/21. He started on treatment with 5FU, irinotecan, and oxaliplatin (FOLFIRINOX) on 6/7/21. Please see previous notes for further details on the patient's history. He comes in today for routine follow up prior to cycle 2.    Interval History:  Patient reports that the beginning and end of his chemotherapy went fine, but midcycle he had more difficulty with symptoms between days 4-9.  He reports about 36 hours of diarrhea.  He initially felt like Imodium was not helpful, but with subsequent diarrhea did seem to help.  He also had constant nausea with 3 episodes of vomiting.  He did get some relief from Compazine.  He reports some headaches.  This did occur around the time when he was not feeling well and he also stopped drinking coffee.  He previously was drinking 5 or 6 cups/day.  He did start on pancreatic enzymes 2 days ago.  He reports he has had some difficulty sleeping and took a dose of Tylenol PM with relief.  He previously had tried a few doses of Ativan, but felt it only lasted for about 3 hours and then felt poorly afterwards.  He did have 2 days of increased epigastric and left upper quadrant pain that was sharp and intermittent.  This has since resolved.  He also was having some rotating abdominal pain.  He has had some taste changes.  He reports that he has a good mood and he feels hopeful.  He has been doing some  "work.  He is concerned about his wife's mood and notes that she has a history of depression.  He historically has gotten regular exercise, but has not been exercising over the last 2 weeks.  Patient has cold sensitivity for a few days after chemo. He denies any neuropathy separate from cold. He denies any mouth sores. He denies other concerns.    Current Outpatient Medications   Medication Sig Dispense Refill     Acetaminophen (TYLENOL) 325 MG CAPS 325 tablets by Oral or Feeding Tube route as needed       diphenhydrAMINE-APAP, sleep, (TYLENOL PM EXTRA STRENGTH PO)        diphenoxylate-atropine (LOMOTIL) 2.5-0.025 MG tablet Take 1-2 tablets by mouth 4 times daily as needed for diarrhea 60 tablet 0     lipase-protease-amylase (ZENPEP) 74194-684443 units CPEP Take 2-3 caps with meals and 1-2 caps with snacks 360 capsule 1     loperamide (IMODIUM) 2 MG capsule 2 caps at 1st sign of diarrhea & 1 cap every 2hrs until 12hrs diarrhea free. During night, 2 caps at bedtime & 2 caps every 4hrs until AM 30 capsule 0     OLANZapine (ZYPREXA) 2.5 MG tablet Take 1 tablet (2.5 mg) by mouth At Bedtime 30 tablet 1     ondansetron (ZOFRAN-ODT) 8 MG ODT tab Take 1 tablet (8 mg) by mouth every 8 hours as needed for nausea 30 tablet 3     prochlorperazine (COMPAZINE) 10 MG tablet Take 1 tablet (10 mg) by mouth every 6 hours as needed (Nausea/Vomiting) 30 tablet 2     dexamethasone (DECADRON) 4 MG tablet Take 2 tablets (8 mg) by mouth daily for 3 days Days 2 through 4. 6 tablet 2     LORazepam (ATIVAN) 0.5 MG tablet Take 1 tablet (0.5 mg) by mouth every 4 hours as needed (Anxiety, Nausea/Vomiting or Sleep) (Patient not taking: Reported on 6/22/2021) 30 tablet 2     Physical Examination:  General: The patient is a pleasant male in no acute distress.  /71   Pulse 75   Temp 98  F (36.7  C) (Oral)   Resp 16   Ht 1.854 m (6' 1\")   Wt 89.4 kg (197 lb 3.2 oz)   SpO2 98%   BMI 26.02 kg/m    Wt Readings from Last 10 Encounters: "   06/22/21 89.4 kg (197 lb 3.2 oz)   06/07/21 90.5 kg (199 lb 8 oz)   05/28/21 90.7 kg (200 lb)   HEENT: EOMI, PERRL. Sclerae are anicteric. Oral mucosa is pink and moist with no lesions or thrush.   Lymph: Neck is supple with no lymphadenopathy in the cervical or supraclavicular areas.   Heart: Regular rate and rhythm.   Lungs: Clear to auscultation bilaterally.   Abdomen: Bowel sounds present, soft, mild epigastric and RUQ tenderness, remainder nontender with no palpable hepatosplenomegaly or masses.   Extremities: No lower extremity edema noted bilaterally.   Neuro: Cranial nerves II through XII are grossly intact.  Skin: No skin changes on palms. No rashes, petechiae, or bruising noted on exposed skin.    Laboratory Data:  Most Recent 3 CBC's:  Recent Labs   Lab Test 06/22/21  0710 06/07/21  0649 06/04/21  0830   WBC 3.3* 5.9 5.1   HGB 12.9* 14.1 14.0   MCV 87 88 89    147* 142*    Most Recent 3 BMP's:  Recent Labs   Lab Test 06/22/21  0710 06/07/21  0649    141   POTASSIUM 3.5 3.9   CHLORIDE 103 106   CO2 30 29   BUN 15 12   CR 0.91 0.80   ANIONGAP 5 5   CHRISTINA 8.8 8.7   * 86    Most Recent 2 LFT's:  Recent Labs   Lab Test 06/22/21  0816 06/22/21  0710 06/07/21  0649   AST 25 17 23   ALT 27 33 23   ALKPHOS  --  93 71   BILITOTAL 0.6 0.4 0.5   I reviewed the above labs today.    Assessment and Plan:  Metastatic pancreatic cancer. Patient tolerated the first cycle of FOLFIRINOX reasonably well with some difficulty with nausea and diarrhea mid-cycle. He will continue with cycle 2 today. I will check on the status of testing with NGS panel, Fusion, PDL-1, MSI, BRCA, and PAL-B2.  He will continue with chemotherapy every 2 weeks and will follow-up with Dr. Kemp in early August with a CT CAP.  He will call sooner for concerns.    Nausea. Secondary to chemotherapy. He does not wish to take Ativan or have it in his house, so will dispose of this with his local 's office. He will try Zyprexa at  bedtime on days 3-9. He may also take Zofran and Compazine as needed.     Diarrhea. Recommend taking Imodium for mild diarrhea and Lomotil for more severe diarrhea.     Fatigue. Recommend resuming daily exercise.     Pancreatic insufficiency. Patient will continue on pancreatic enzymes. He will be meeting with our dietician this week. Of note, his albumin and total protein returned mildly low. He should incorporate more protein into his diet. He will continue with small, frequent meals.     Merle Freeman PA-C  [facility]  [address] [place] [id number] [phone]    70 minutes spent on the date of the encounter doing chart review, review of test results, interpretation of tests, patient visit and documentation

## 2021-06-22 NOTE — TELEPHONE ENCOUNTER
[facility] Triage    Markus is asking if he needs to continue dexamethasone this round of chemo. He did take dexamethasone with his first round of chemo.     Please resend medication to [facility] if the medication is still needed to be taken.    Outcome:    Therapy plan reviewed, dexamethasone is part of plan. Medications resent to requested pharmacy.

## 2021-06-22 NOTE — NURSING NOTE
Oncology Rooming Note    June 22, 2021 7:20 AM   Markus Stallworth is a 61 year old male who presents for:    Chief Complaint   Patient presents with     Port Draw     Labs drawn via port by RN in lab. VS taken.      Oncology Clinic Visit     Patient with liver metastases here for provider visit and lab review      Initial Vitals: /71   Pulse 75   Temp 98  F (36.7  C) (Oral)   Resp 16   Wt 89.4 kg (197 lb 3.2 oz)   SpO2 98%   BMI 26.75 kg/m   Estimated body mass index is 26.75 kg/m  as calculated from the following:    Height as of 5/28/21: 1.829 m (6').    Weight as of this encounter: 89.4 kg (197 lb 3.2 oz). Body surface area is 2.13 meters squared.  No Pain (0) Comment: Data Unavailable   No LMP for male patient.  Allergies reviewed: Yes  Medications reviewed: Yes    Medications: Medication refills not needed today.  Pharmacy name entered into [facility] [facility]: [id number] - [facility] [address] [address]    Clinical concerns:       Jessica Mccall CMA

## 2021-06-22 NOTE — LETTER
6/22/2021         RE: Markus Stallworth  [address]  [facility] [address]      Oncology/Hematology Visit Note  Jun 22, 2021    Reason for Visit: follow up of metastatic pancreatic cancer    History of Present Illness: Markus Stallworth is a 61 year old male with metastatic pancreatic cancer. He initially presented with modest left upper quadrant pain that led to an ultrasound and then further imaging demonstrating the presence of a large mass in the tail of his pancreas with associated splenic vascular involvement with splenomegaly and liver metastases.  A biopsy of one of his liver lesions shows an adenocarcinoma consistent with pancreatic primary. His port was placed on 6/4/21. He started on treatment with 5FU, irinotecan, and oxaliplatin (FOLFIRINOX) on 6/7/21. Please see previous notes for further details on the patient's history. He comes in today for routine follow up prior to cycle 2.    Interval History:  Patient reports that the beginning and end of his chemotherapy went fine, but midcycle he had more difficulty with symptoms between days 4-9.  He reports about 36 hours of diarrhea.  He initially felt like Imodium was not helpful, but with subsequent diarrhea did seem to help.  He also had constant nausea with 3 episodes of vomiting.  He did get some relief from Compazine.  He reports some headaches.  This did occur around the time when he was not feeling well and he also stopped drinking coffee.  He previously was drinking 5 or 6 cups/day.  He did start on pancreatic enzymes 2 days ago.  He reports he has had some difficulty sleeping and took a dose of Tylenol PM with relief.  He previously had tried a few doses of Ativan, but felt it only lasted for about 3 hours and then felt poorly afterwards.  He did have 2 days of increased epigastric and left upper quadrant pain that was sharp and intermittent.  This has since resolved.  He also was having some rotating abdominal pain.  He has had some taste changes.  He  "reports that he has a good mood and he feels hopeful.  He has been doing some work.  He is concerned about his wife's mood and notes that she has a history of depression.  He historically has gotten regular exercise, but has not been exercising over the last 2 weeks.  Patient has cold sensitivity for a few days after chemo. He denies any neuropathy separate from cold. He denies any mouth sores. He denies other concerns.    Current Outpatient Medications   Medication Sig Dispense Refill     Acetaminophen (TYLENOL) 325 MG CAPS 325 tablets by Oral or Feeding Tube route as needed       diphenhydrAMINE-APAP, sleep, (TYLENOL PM EXTRA STRENGTH PO)        diphenoxylate-atropine (LOMOTIL) 2.5-0.025 MG tablet Take 1-2 tablets by mouth 4 times daily as needed for diarrhea 60 tablet 0     lipase-protease-amylase (ZENPEP) 71861-943019 units CPEP Take 2-3 caps with meals and 1-2 caps with snacks 360 capsule 1     loperamide (IMODIUM) 2 MG capsule 2 caps at 1st sign of diarrhea & 1 cap every 2hrs until 12hrs diarrhea free. During night, 2 caps at bedtime & 2 caps every 4hrs until AM 30 capsule 0     OLANZapine (ZYPREXA) 2.5 MG tablet Take 1 tablet (2.5 mg) by mouth At Bedtime 30 tablet 1     ondansetron (ZOFRAN-ODT) 8 MG ODT tab Take 1 tablet (8 mg) by mouth every 8 hours as needed for nausea 30 tablet 3     prochlorperazine (COMPAZINE) 10 MG tablet Take 1 tablet (10 mg) by mouth every 6 hours as needed (Nausea/Vomiting) 30 tablet 2     dexamethasone (DECADRON) 4 MG tablet Take 2 tablets (8 mg) by mouth daily for 3 days Days 2 through 4. 6 tablet 2     LORazepam (ATIVAN) 0.5 MG tablet Take 1 tablet (0.5 mg) by mouth every 4 hours as needed (Anxiety, Nausea/Vomiting or Sleep) (Patient not taking: Reported on 6/22/2021) 30 tablet 2     Physical Examination:  General: The patient is a pleasant male in no acute distress.  /71   Pulse 75   Temp 98  F (36.7  C) (Oral)   Resp 16   Ht 1.854 m (6' 1\")   Wt 89.4 kg (197 lb 3.2 " oz)   SpO2 98%   BMI 26.02 kg/m    Wt Readings from Last 10 Encounters:   06/22/21 89.4 kg (197 lb 3.2 oz)   06/07/21 90.5 kg (199 lb 8 oz)   05/28/21 90.7 kg (200 lb)   HEENT: EOMI, PERRL. Sclerae are anicteric. Oral mucosa is pink and moist with no lesions or thrush.   Lymph: Neck is supple with no lymphadenopathy in the cervical or supraclavicular areas.   Heart: Regular rate and rhythm.   Lungs: Clear to auscultation bilaterally.   Abdomen: Bowel sounds present, soft, mild epigastric and RUQ tenderness, remainder nontender with no palpable hepatosplenomegaly or masses.   Extremities: No lower extremity edema noted bilaterally.   Neuro: Cranial nerves II through XII are grossly intact.  Skin: No skin changes on palms. No rashes, petechiae, or bruising noted on exposed skin.    Laboratory Data:  Most Recent 3 CBC's:  Recent Labs   Lab Test 06/22/21  0710 06/07/21  0649 06/04/21  0830   WBC 3.3* 5.9 5.1   HGB 12.9* 14.1 14.0   MCV 87 88 89    147* 142*    Most Recent 3 BMP's:  Recent Labs   Lab Test 06/22/21  0710 06/07/21  0649    141   POTASSIUM 3.5 3.9   CHLORIDE 103 106   CO2 30 29   BUN 15 12   CR 0.91 0.80   ANIONGAP 5 5   CHRISTINA 8.8 8.7   * 86    Most Recent 2 LFT's:  Recent Labs   Lab Test 06/22/21  0816 06/22/21  0710 06/07/21  0649   AST 25 17 23   ALT 27 33 23   ALKPHOS  --  93 71   BILITOTAL 0.6 0.4 0.5   I reviewed the above labs today.    Assessment and Plan:  Metastatic pancreatic cancer. Patient tolerated the first cycle of FOLFIRINOX reasonably well with some difficulty with nausea and diarrhea mid-cycle. He will continue with cycle 2 today. I will check on the status of testing with NGS panel, Fusion, PDL-1, MSI, BRCA, and PAL-B2.  He will continue with chemotherapy every 2 weeks and will follow-up with Dr. Kemp in early August with a CT CAP.  He will call sooner for concerns.    Nausea. Secondary to chemotherapy. He does not wish to take Ativan or have it in his house, so  will dispose of this with his local 's office. He will try Zyprexa at bedtime on days 3-9. He may also take Zofran and Compazine as needed.     Diarrhea. Recommend taking Imodium for mild diarrhea and Lomotil for more severe diarrhea.     Fatigue. Recommend resuming daily exercise.     Pancreatic insufficiency. Patient will continue on pancreatic enzymes. He will be meeting with our dietician this week. Of note, his albumin and total protein returned mildly low. He should incorporate more protein into his diet. He will continue with small, frequent meals.     Merle Freeman PA-C  [facility]  [address] [place] [id number]  [phone]    70 minutes spent on the date of the encounter doing chart review, review of test results, interpretation of tests, patient visit and documentation           Merle Freeman PA-C

## 2021-06-22 NOTE — NURSING NOTE
Chief Complaint   Patient presents with     Port Draw     Labs drawn via port by RN in lab. VS taken.      Port accessed with 20g flat needle by RN, labs collected, line flushed with saline and heparin.  Vitals taken. Pt checked in for appointment(s).    Skylar MONTALVO RN PHN BSN  BMT/Oncology Lab

## 2021-06-22 NOTE — PROGRESS NOTES
"Infusion Nursing Note:  Markus Stallworth presents today for Day 1 Cycle 2 Oxaliplatin, Irinotecan, Fluorouracil home connect.    Patient seen by provider today: Yes: Merle MARTÍNEZ   present during visit today: Not Applicable.    Note: Patient presents to infusion feeling ok. Patient denies pain and states no acute complaints or concerns not addressed during appointment with Merle MARTÍNEZ. Patient states he did have tolerable intermittent cold sensitivity to hands when touching cold items from fridge and post drinking cold items. Home modifications and reminders to limit cold exposure at home reviewed.    At 1248, patient states tongue feels thick/numb and nose has started to drip. Patient denies troubles swallowing or recent cold exposure. Warm water given to drink with patient stating symptoms improved. Patient appears in non-distress. Vss.     At the end of Irinotecan at 1329, patient states 20 minutes prior to chemo being completed he became sweaty. Vsse with patient stating sweatiness has improved since then and would be considered \"minor\" when it began. Patient also states tongue still feels a little numb, but symptoms continue to improve.    Intravenous Access:  Implanted Port.    Treatment Conditions:  Lab Results   Component Value Date    HGB 12.9 06/22/2021     Lab Results   Component Value Date    WBC 3.3 06/22/2021      Lab Results   Component Value Date    ANEU 1.9 06/22/2021     Lab Results   Component Value Date     06/22/2021      Lab Results   Component Value Date     06/22/2021                   Lab Results   Component Value Date    POTASSIUM 3.5 06/22/2021           No results found for: MAG         Lab Results   Component Value Date    CR 0.91 06/22/2021                   Lab Results   Component Value Date    CHRISTINA 8.8 06/22/2021                Lab Results   Component Value Date    BILITOTAL 0.6 06/22/2021           Lab Results   Component Value Date    ALBUMIN 3.0 06/22/2021 " "                   Lab Results   Component Value Date    ALT 27 06/22/2021           Lab Results   Component Value Date    AST 25 06/22/2021       Results reviewed, labs MET treatment parameters, ok to proceed with treatment.      Post Infusion Assessment:  Patient tolerated infusion without incident.  Blood return noted pre and post infusion.  Site patent and intact, free from redness, edema or discomfort.  No evidence of extravasations.     Prior to discharge: Port is secured in place with tegaderm and flushed with 10cc NS with positive blood return noted.  Continuous home infusion C-series connected.    All connectors secured in place, clamps taped open, heat sensor secured with tegaderm all verified with Angela John RN   Pump started, \"running\" noted on display (CADD): N/A  Patient instructed to call our clinic or [facility] with any questions or concerns at home.  Patient verbalized understanding.    Patient set up for pump disconnect at home with [facility] on 6/24 at 1130.  Appointment verified by Arnold from [facility].  C-series maintenance education reviewed: Keep heat sensor secured to abdomen with tegaderm, maintain consistent body temperature (decrease shivering/cold exposure and decrease sweating/heat exposure) to ensure appropriate infusion rate, assess tubing for any kinks, and notify [facility] of any leaks.    Discharge Plan:   Patient declined prescription refills.  Discharge instructions reviewed with: Patient.  Patient and/or family verbalized understanding of discharge instructions and all questions answered.  Copy of AVS reviewed with patient and/or family.  Patient will return 7/6 for next appointment.  Patient discharged in stable condition accompanied by: self.  Departure Mode: Ambulatory.  Face to Face time: 0 minutes.      Chinmay Kendrick RN                      "

## 2021-06-23 NOTE — PROGRESS NOTES
"Video-Visit Details     Type of service:  Video Visit     Video Start Time (time video started): 8:59am     Video End Time (time video stopped): intermittent loss of connection    Originating Location (pt. Location): Home     Distant Location (provider location):  [facility]      Mode of Communication:  Video Conference via [facility]    CLINICAL NUTRITION SERVICES - ASSESSMENT NOTE    Markus Stallworth 61 year old referred for MNT related to pancreatic cancer    Time Spent: 60 minutes  Visit Type: Video  Pt accompanied by: self  Referring Physician: Viridiana 6/4/21  C25.2 (ICD-10-CM) - Malignant neoplasm of tail of pancreas (H)    NUTRITION HISTORY  Factors affecting nutrition intake include:abdominal pain, constipation and diarrhea  Current diet/appetite: general  Chemotherapy: Folfirinox started 6/7/21    Markus presents today with questions regarding food choices with cancer and cancer treatment.  -He has been eating small portions but eats frequently as he is always hungry  -He is taking Zenpep 40,  2-3 with meals, 1-2 with snacks which he started on Sunday.  -Up until then, he had erratic bowel movements, mostly diarrhea which he took imodium for which led to constipation.  He tells me that her bowels are now back to normal.     Diet Recall  Breakfast Oatmeal and bagel with cream cheese, banana OR toast with jelly, 1 cup peppermint tea   Lunch Mild Chinese take out fried rice with egg, veg chicken   Dinner Grilled burgers with bun, cottage cheese OR    Snacks [place] ice cream bar   Beverages Milk, water      ANTHROPOMETRICS  Height: 6'0\"  Weight: 199 lb/90kg  BMI: 27  Weight Status:  Overweight BMI 25-29.9  IBW: 178 lb (111%)  Weight History:   Wt Readings from Last 6 Encounters:   06/22/21 89.4 kg (197 lb 3.2 oz)   06/07/21 90.5 kg (199 lb 8 oz)   05/28/21 90.7 kg (200 lb)     Dosing Weight: 90kg    Medications/vitamins/minerals/herbals:   Reviewed  Zenpep 40 - taking ~8/day (900 " units lipase/kg/meal wnl for therapeutic dose    Labs:   Labs reviewed    NUTRITION FOCUSED PHYSICAL ASSESSMENT FOR DIAGNOSING MALNUTRITION:  Not observed due to Covid 19 pandemic - no clinic contact  Consult for education only      ASSESSED NUTRITION NEEDS:  Estimated Energy Needs: 1183-8537 kcals (30-35 Kcal/Kg)  Justification: increased needs with chemotherapy  Estimated Protein Needs: 110-135 grams protein (1.2-1.5 g pro/Kg)  Justification: increased needs with chemotherapy  Estimated Fluid Needs: 3000  mL   Justification: 1mL/kcal    NUTRITION DIAGNOSIS:  Food and nutrition-related knowledge deficit related to nutrition needs with cancer/cancer treatment as evidenced by pt with questions regarding food choices with cancer and EPI    INTERVENTIONS  Provided written & verbal education:   - Encouraged to focus on small, frequent meals.  Encouraged to have a protein source with each meal and snack.      - Reviewed nutrition and hydration needs. Advised pt to aim for at least 2700kcal and 110-135g protein daily. Advised pt to aim for 10-12 cups non-caffeine containing beverages (water/electrolytes) daily.  - Reviewed PERT administration, dosing etc.  Encouraged to continue taking 2-3 with meals and 1-2 with snacks. Encouraged to take enzymes throughout meal versus all at the beginning or end of meal.   - Reviewed oral nutrition supplement options (Ensure Enlive, Ensure Plus/Boost Plus, CIB with [employer] milk etc). Encouraged utilizing these ONS in home made shakes/smoothies to prevent flavor fatigue.      Provided pt with corresponding education materials/handouts on:  High Calorie/High Protein Recipe booklet, Tips to Increase the Protein in Your Diet and Sources of Protein    Pt verbalize understanding of materials provided during consult.   Patient Understanding: Excellent  Expected patient engagement: Excellent    Goals  1.  Aim for 5-6 small frequent meals  2.  Aim for 2700kcal and 135g protein/day  3. Weight  maintenance     Follow-Up Plans: Pt has RD contact information for questions.      Penny Ynaes, JAIME, LD

## 2021-06-23 NOTE — LETTER
"    6/23/2021         RE: Markus Stallworth  [address]  [place] [facility] [address]        Dear Colleague,    Thank you for referring your patient, Markus Stallworth, to the [facility]. Please see a copy of my visit note below.    Video-Visit Details     Type of service:  Video Visit     Video Start Time (time video started): 8:59am     Video End Time (time video stopped): intermittent loss of connection    Originating Location (pt. Location): Home     Distant Location (provider location):  [facility] NUTRITION SERVICES      Mode of Communication:  Video Conference via [employer]    CLINICAL NUTRITION SERVICES - ASSESSMENT NOTE    Markus Stallworth 61 year old referred for MNT related to pancreatic cancer    Time Spent: 60 minutes  Visit Type: Video  Pt accompanied by: self  Referring Physician: Viridiana 6/4/21  C25.2 (ICD-10-CM) - Malignant neoplasm of tail of pancreas (H)    NUTRITION HISTORY  Factors affecting nutrition intake include:abdominal pain, constipation and diarrhea  Current diet/appetite: general  Chemotherapy: Folfirinox started 6/7/21    Markus presents today with questions regarding food choices with cancer and cancer treatment.  -He has been eating small portions but eats frequently as he is always hungry  -He is taking Zenpep 40,  2-3 with meals, 1-2 with snacks which he started on Sunday.  -Up until then, he had erratic bowel movements, mostly diarrhea which he took imodium for which led to constipation.  He tells me that her bowels are now back to normal.     Diet Recall  Breakfast Oatmeal and bagel with cream cheese, banana OR toast with jelly, 1 cup peppermint tea   Lunch Mild Chinese take out fried rice with egg, veg chicken   Dinner Grilled burgers with bun, cottage cheese OR    Snacks [place] ice cream bar   Beverages Milk, water      ANTHROPOMETRICS  Height: 6'0\"  Weight: 199 lb/90kg  BMI: 27  Weight Status:  Overweight BMI 25-29.9  IBW: 178 lb (111%)  Weight History: "   Wt Readings from Last 6 Encounters:   06/22/21 89.4 kg (197 lb 3.2 oz)   06/07/21 90.5 kg (199 lb 8 oz)   05/28/21 90.7 kg (200 lb)     Dosing Weight: 90kg    Medications/vitamins/minerals/herbals:   Reviewed  Zenpep 40 - taking ~8/day (900 units lipase/kg/meal wnl for therapeutic dose    Labs:   Labs reviewed    NUTRITION FOCUSED PHYSICAL ASSESSMENT FOR DIAGNOSING MALNUTRITION:  Not observed due to Covid 19 pandemic - no clinic contact  Consult for education only      ASSESSED NUTRITION NEEDS:  Estimated Energy Needs: 2534-5333 kcals (30-35 Kcal/Kg)  Justification: increased needs with chemotherapy  Estimated Protein Needs: 110-135 grams protein (1.2-1.5 g pro/Kg)  Justification: increased needs with chemotherapy  Estimated Fluid Needs: 3000  mL   Justification: 1mL/kcal    NUTRITION DIAGNOSIS:  Food and nutrition-related knowledge deficit related to nutrition needs with cancer/cancer treatment as evidenced by pt with questions regarding food choices with cancer and EPI    INTERVENTIONS  Provided written & verbal education:   - Encouraged to focus on small, frequent meals.  Encouraged to have a protein source with each meal and snack.      - Reviewed nutrition and hydration needs. Advised pt to aim for at least 2700kcal and 110-135g protein daily. Advised pt to aim for 10-12 cups non-caffeine containing beverages (water/electrolytes) daily.  - Reviewed PERT administration, dosing etc.  Encouraged to continue taking 2-3 with meals and 1-2 with snacks. Encouraged to take enzymes throughout meal versus all at the beginning or end of meal.   - Reviewed oral nutrition supplement options (Ensure Enlive, Ensure Plus/Boost Plus, CIB with [employer] milk etc). Encouraged utilizing these ONS in home made shakes/smoothies to prevent flavor fatigue.      Provided pt with corresponding education materials/handouts on:  High Calorie/High Protein Recipe booklet, Tips to Increase the Protein in Your Diet and Sources of  Protein    Pt verbalize understanding of materials provided during consult.   Patient Understanding: Excellent  Expected patient engagement: Excellent    Goals  1.  Aim for 5-6 small frequent meals  2.  Aim for 2700kcal and 135g protein/day  3. Weight maintenance     Follow-Up Plans: Pt has RD contact information for questions.      Penny Yanes RD, LD          Again, thank you for allowing me to participate in the care of your patient.        Sincerely,        Penny Yanes RD

## 2021-06-24 NOTE — PROGRESS NOTES
This is a recent snapshot of the patient's [facility] medical record.  For current drug dose and complete information and questions, call [phone] or In Basket pool, fv home infusion ([id number])  CSN Number:  [id number]

## 2021-06-24 NOTE — PROGRESS NOTES
Skilled nurse visit in the home, for discontinuation of fluorouracil 5160 mg infused over 45 hours. Pt tolerated infusion ok, with some neuropathy in feet with walking, cold neuropathy with swallowing, mild nausea, and constipation. Appetite continues to be poor. Pt educated on symptom management.     Isha Agosto RN BSN  [facility]  Email: [email or web address]  Phone: [phone]

## 2021-06-25 NOTE — PROGRESS NOTES
This is a recent snapshot of the patient's [facility] medical record.  For current drug dose and complete information and questions, call [phone] or In Basket pool, fv home infusion ([id number])  CSN Number:  [id number]

## 2021-07-06 NOTE — PROGRESS NOTES
RN Care Coordination Note  Received call from CATHIE Velasquez with [facility]. She states she has received appeal to review information for patients Folfurinox treatment. States fluorouracil for home infusion has been approved. However, oxaliplatin and irinotecan have been denied. Reports supporting clinically documentation was not received with original approval request. Writer reviewed with CATHIE Velasquez patient has stage 4 pancreatic ca, Folfurinox is NCCN/FDA approved first line treatment for pancreatic cancer. She states urgent appeal will be started today, with hopes of approval today or tomorrow.     Clinical notes, LMN, infusion notes, treatment plan faxed to [facility] at [phone] via [employer]. Case Number: [id number]     Confirmation of fax received via [employer]         Mitali Will RN, BSN, OCN   RN Care Coordinator   [facility]

## 2021-07-06 NOTE — PROGRESS NOTES
Infusion Nursing Note:  Markus Stallworth presents today for Cycle 3 Day 1 Oxaliplatin, Irinotecan, and Fluorouracil pump connect.    Patient seen by provider today: No   present during visit today: Not Applicable.    Note: Patient feels well today.  Patient states that cycle 2 side effects were significantly less than cycle 1.  He thinks the additions of Zofran and pancreatic enzymes were helpful.    About 30 minutes into start of Irinotecan patient experienced hand tingling, leg cramping and a thick tongue sensation.  Warm pack and warm blanket help hand tingling and leg cramping.  Patient drank warm water with no change to thick tongue sensation.  Gave second dose of atropine.  Tongue thickness/numbness remained stable at time of discharge.  Tongue feels the same as it has in the past.  Patient denies any shortness of breath or difficulty swallowing.  All vital signs stable.  Patient knows to call if tongue does not improve as it typically dose or he experiences any other symptoms.    Dr. Kemp updated via page that patient experienced these symptoms similar to previous cycles.    Intravenous Access:  Implanted Port.    Treatment Conditions:  Lab Results   Component Value Date    HGB 11.9 07/06/2021     Lab Results   Component Value Date    WBC 4.2 07/06/2021      Lab Results   Component Value Date    ANEU 2.7 07/06/2021     Lab Results   Component Value Date     07/06/2021      Lab Results   Component Value Date     07/06/2021                   Lab Results   Component Value Date    POTASSIUM 3.5 07/06/2021             Lab Results   Component Value Date    CR 0.88 07/06/2021                   Lab Results   Component Value Date    CHRISTINA 8.3 07/06/2021                Lab Results   Component Value Date    BILITOTAL 0.3 07/06/2021           Lab Results   Component Value Date    ALBUMIN 2.8 07/06/2021                    Lab Results   Component Value Date    ALT 32 07/06/2021           Lab Results  "  Component Value Date    AST 24 07/06/2021       Results reviewed, labs MET treatment parameters, ok to proceed with treatment.      Post Infusion Assessment:  Patient tolerated infusion without incident.  Blood return noted pre and post infusion.  Site patent and intact, free from redness, edema or discomfort.  No evidence of extravasations.  Prior to discharge: Port is secured in place with tegaderm and flushed with 10cc NS with positive blood return noted.  Continuous home infusion Dosi-Fuser pump connected.    All connectors secured in place and clamps taped open.    Pump started, \"running\" noted on display (CADD): Not Applicable.  Pump Connection double checked with Andressa Ball RN.  Patient instructed to call our clinic or [facility] with any questions or concerns at home.  Patient verbalized understanding.    Patient set up for pump disconnect at home with [facility] on 7/8/21 at 1100 (time per patient request).  Spoke with CATHIE Barrett at [facility].      Discharge Plan:   Prescription refills given for dexamethasone. Patient picked up at local pharmacy.  Discharge instructions reviewed with: Patient.  Patient and/or family verbalized understanding of discharge instructions and all questions answered.  AVS to patient via [employer].  Patient will return 7/19/21 for next appointment.   Patient discharged in stable condition accompanied by: self.  Departure Mode: Ambulatory.      Randee Fregoso RN                     "

## 2021-07-06 NOTE — PATIENT INSTRUCTIONS
Contact Numbers  [facility]: [phone] (for symptom and scheduling needs)    Please call the [facility] Triage line if you experience a temperature greater than or equal to 100.4, shaking chills, have uncontrolled nausea, vomiting and/or diarrhea, dizziness, shortness of breath, chest pain, bleeding, unexplained bruising, or if you have any other new/concerning symptoms, questions or concerns.     If you are having any concerning symptoms or wish to speak to a provider before your next infusion visit, please call your care coordinator or triage to notify them so we can adequately serve you.     If you need a refill on a narcotic prescription or other medication, please call triage before your infusion appointment.

## 2021-07-06 NOTE — NURSING NOTE
Chief Complaint   Patient presents with     Port Draw     Labs drawn via port by RN in lab. VS taken.      Port accessed with 20 gauge 3/4 inch flat needle and labs drawn by rn.  Port flushed with saline and heparin.  Pt tolerated well.  VS taken.  Pt checked in for next appt.    Angela Le RN

## 2021-07-08 NOTE — PROGRESS NOTES
This is a recent snapshot of the patient's [facility] medical record.  For current drug dose and complete information and questions, call [phone] or In Basket pool, fv home infusion ([id number])  CSN Number:  [id number]

## 2021-07-08 NOTE — PROGRESS NOTES
This is a recent snapshot of the patient's [facility] medical record.  For current drug dose and complete information and questions, call [phone] or In Basket pool, fv home infusion ([id number])  CSN Number:  [id number]

## 2021-07-13 NOTE — PROGRESS NOTES
This is a recent snapshot of the patient's [facility] medical record.  For current drug dose and complete information and questions, call [phone] or In Basket pool, fv home infusion ([id number])  CSN Number:  [id number]

## 2021-07-15 NOTE — PROGRESS NOTES
This is a recent snapshot of the patient's [facility] medical record.  For current drug dose and complete information and questions, call [phone] or In Basket pool, fv home infusion ([id number])  CSN Number:  [id number]

## 2021-07-15 NOTE — DISCHARGE INSTRUCTIONS
PT Exercises to focus on--    30 minutes a day of walking/biking most days of the week   Strengthening/gym workout 2 times per week     Lower body exercise ideas--   Sit to stand or squats   Lunges   Walking up and down the stairs     Do with 5-10 lbs weights--  Upper body exercises   Biceps   Tricep extension  Overhead press   Lat pull down   Push up (be careful though of port)     Listen to your body and fatigue level-- keep exercise at moderate level      [email or web address]   [phone]

## 2021-07-15 NOTE — PROGRESS NOTES
07/15/21 0700   Quick Adds   Quick Adds Certification   Type of Visit Initial OP PT Evaluation   General Information   Start of Care Date 07/15/21   Referring Physician Luisito Kemp MD   Orders Evaluate and Treat as Indicated   Order Date 06/02/21   Medical Diagnosis Malignant neoplasm of tail of pancreas    Onset of illness/injury or Date of Surgery 06/02/21   Surgical/Medical history reviewed Yes  (pancreatic CA )   Pertinent history of current problem (include personal factors and/or comorbidities that impact the POC) Patient with hx of malignant pancreatic CA dx'd in June. He is doing chemo every other week. Hx of exercising regularily and quite active throughout this life. Less active this last year with the pandemic and now CA dx. He is looking to find out what type of exercise he is able to continue. He has gym membership and he also has weights at home. He is wondering about going to gym and whether this is okay during chemo. Not sleeping well since he started treatment-- steroids and getting up in the night or starting the day early, more fatigued.   Prior level of function comment active at baseline-- weights at the gym, walking, running, biking    Patient role/Employment history Employed  ( )   Living environment [place]   Home/Community Accessibility Comments live with spouse, stairs at home    Patient/Family Goals Statement establish exercise routine    Fall Risk Screen   Fall screen completed by PT   Have you fallen 2 or more times in the past year? No   Have you fallen and had an injury in the past year? No   Timed Up and Go score (seconds) not tested    Is patient a fall risk? No   Abuse Screen (yes response referral indicated)   Feels Unsafe at Home or Work/School no   Feels Threatened by Someone no   Does Anyone Try to Keep You From Having Contact with Others or Doing Things Outside Your Home? no   System Outcome Measures   Outcome Measures Cancer Rehab   FACIT Fatigue  Subscale (score out of 52). The higher the score, the better the QOL. 42   Six Minute Walk (meters). An increase of 70 or more meters indicates statistically significant change.   (deferred today to focus on strengthening program )   Pain   Pain comments denies any pain today    Cognitive Status Examination   Orientation orientation to person, place and time   Level of Consciousness alert   Follows Commands and Answers Questions 100% of the time   Personal Safety and Judgment intact   Memory intact   Integumentary   Integumentary Comments port placement at L shoulder/chest    Posture   Posture Comments WFLs    Range of Motion (ROM)   ROM Comment grossly WFLs    Strength   Strength Comments demo'd lifting 5 lbs overhead, tricep extension, bicep curls; LE strength WNLs    Bed Mobility   Bed Mobility Comments denies issues    Transfer Skills   Transfer Comments no issues with sit to stand    Gait   Gait Comments ambulates with steady gait, no instability, proper foot clearance    Gait Special Tests   Gait Special Tests SIX MINUTE WALK TEST   Gait Special Tests Six Minute Walk Test   Comments deferred today in order to focus on discussion and practice of exercise program for strengthening-- normally able to walk 30+ minutes without issues    Balance   Balance Comments WFLs    Sensory Examination   Sensory Perception Comments reports neuropathy in feet at times    Planned Therapy Interventions   Planned Therapy Interventions ROM;strengthening;stretching  (progressing functional endurance )   Clinical Impression   Criteria for Skilled Therapeutic Interventions Met yes, treatment indicated   PT Diagnosis decreased activity tolerance in setting of CA    Influenced by the following impairments decreased strength and activit tolerance from baseline    Functional limitations due to impairments unable to participate in typical exercise routine, decreased energy for iADLs    Clinical Presentation Evolving/Changing   Clinical  Presentation Rationale type of CA, fatigue, impairments    Clinical Decision Making (Complexity) Moderate complexity   Therapy Frequency other (see comments)  (possibly follow up in 1-2 months depending on needs )   Predicted Duration of Therapy Intervention (days/wks) 60 days   Risk & Benefits of therapy have been explained Yes   Patient, Family & other staff in agreement with plan of care Yes   Clinical Impression Comments Patient presents with dx of pancreatic CA with chemo every other week. Patient wants to conitnue doing some type of exercising as he under gos treatment so session was focused on education around this today. Patient wondering about going to the gym and if this is safe currently with compromised immune system-- I will reach out to team to check on this. He has hx of being a regular exerciser so will do well with HEP provided today and guidelines as he undergoing treatment.    GOALS   PT Eval Goals 1   Goal 1   Goal Identifier HEP    Goal Description Markus will demonstrate understanding with exercise routine focused on strengthening and functional endurance building.    Target Date 07/15/21   Date Met 07/15/21   Total Evaluation Time   PT Linda, Moderate Complexity Minutes (88313) 25

## 2021-07-19 NOTE — PROGRESS NOTES
"Infusion Nursing Note:  Markus Stallworth presents today for C4,D1 of Folfirinox  Patient seen by provider today: No   present during visit today: Not Applicable.    Note: Pt states he has been going to the [facility] for his first three cycles, pt of Dr Kemp who states he's been tolerating the infusions with little side effects, able to manage any nausea or constipation with oral meds, has had a \"fat tongue approximately 30 minutes after the Oxaliplatin, but it doesn't cause distress\" , per pt it hasn't become worse with each infusion, MD is aware and no interventions have been required. Emergency kit kept at chairside PRN, instructed pt to notify RN staff if he has any problems today.  Pt states he would like to have 1 dose of Atropine before irinotecan infusion, \"the 2nd dose doesn't help me, I prefer not to have it\".    Patient did meet criteria for an asymptomatic covid-19 PCR test in infusion today. Patient declined the covid-19 test. COVID negative on 06/01/2021    Intravenous Access:  Implanted Port.    Treatment Conditions:  Lab Results   Component Value Date    HGB 12.3 07/19/2021    HGB 11.9 07/06/2021     Lab Results   Component Value Date    WBC 5.2 07/19/2021    WBC 4.2 07/06/2021      Lab Results   Component Value Date    ANEU 2.7 07/06/2021     Lab Results   Component Value Date     07/19/2021     07/06/2021      Lab Results   Component Value Date     07/19/2021     07/06/2021                   Lab Results   Component Value Date    POTASSIUM 3.8 07/19/2021    POTASSIUM 3.5 07/06/2021           No results found for: MAG         Lab Results   Component Value Date    CR 0.92 07/19/2021    CR 0.88 07/06/2021                   Lab Results   Component Value Date    CHRISTINA 8.6 07/19/2021    CHRISTINA 8.3 07/06/2021                Lab Results   Component Value Date    BILITOTAL 0.3 07/19/2021    BILITOTAL 0.3 07/06/2021           Lab Results   Component Value Date    ALBUMIN 3.1 07/19/2021 " "   ALBUMIN 2.8 07/06/2021                    Lab Results   Component Value Date    ALT 29 07/19/2021    ALT 32 07/06/2021           Lab Results   Component Value Date    AST 26 07/19/2021    AST 24 07/06/2021       Results reviewed, labs MET treatment parameters, ok to proceed with treatment.      Post Infusion Assessment:  Patient tolerated infusion without incident.  Blood return noted pre and post infusion.  No evidence of extravasations.     Prior to discharge: Port is secured in place with tegaderm and flushed with 10cc NS with positive blood return noted.  Continuous home infusion Dosi-Fuser pump connected.    All connectors secured in place and clamps taped open.    Pump started, \"running\" noted on display (CADD): Not Applicable.  Pump Connection double checked with Selena Trujillo RN/Bouchra Farmer RN.  Patient instructed to call our clinic or [facility] with any questions or concerns at home.  Patient verbalized understanding.    Patient set up for pump disconnect at home with [facility] on Wednesday 07/21/2021 at 11:30 am.   .        Discharge Plan:   Infusion appointments scheduled for 08/02/2021 and 08/16/2021  Discharge instructions reviewed with: Patient.  Patient and/or family verbalized understanding of discharge instructions and all questions answered.  Patient discharged in stable condition accompanied by: self.  Departure Mode: Ambulatory.      Chelsea Mehta RN                    "

## 2021-07-19 NOTE — PROGRESS NOTES
Patient's port accessed using sterile procedure. Blood return noted. Gold, green and purple tube drawn, labeled and sent to lab. Port flushed with saline and heparin. Patient tolerated lab draw well.       Lainey Lynn RN

## 2021-07-21 NOTE — PROGRESS NOTES
Skilled nurse visit in the home, for discontinuation of fluorouracil. 5160 mg of fluorouracil  infused over 46 hours.    Flori Dobbs RN  [facility]  [email or web address]  [phone]

## 2021-07-22 NOTE — PROGRESS NOTES
Power Port needle left for access of CT scan, Sterile technique performed and maintained. Patient tolerated procedure well. Transparent dressing placed with use of opsite.      Leona Ahn RN  [facility]

## 2021-07-26 NOTE — NURSING NOTE
"Oncology Rooming Note    July 26, 2021 1:25 PM   Markus Stallworth is a 61 year old male who presents for:    No chief complaint on file.    Initial Vitals: There were no vitals taken for this visit. Estimated body mass index is 26.04 kg/m  as calculated from the following:    Height as of 6/22/21: 1.854 m (6' 1\").    Weight as of 7/19/21: 89.5 kg (197 lb 6.4 oz). There is no height or weight on file to calculate BSA.  Data Unavailable Comment: Data Unavailable   No LMP for male patient.  Allergies reviewed: Yes  Medications reviewed: Yes    Medications: Medication refills not needed today.  Pharmacy name entered into [employer]: [id number] [place] [id number] [place] [id number]    Clinical concerns: New concerns: Continuing cough last 10 days-green/yellow sputum production, worse when lying down, disrupts sleep. Spleen pain-intermittent. Digestive issues-ongoing.        Courtney Catalan LPN July 26, 2021 1:27 PM                  "

## 2021-07-26 NOTE — LETTER
7/26/2021         RE: Markus Stallworth  [address]  [facility] [address]          Markus Stallworth returns today in follow-up of metastatic pancreatic cancer.    He was diagnosed little over 2 months ago with a large tumor in the tail of his pancreas with associated liver metastasis.  He has now received his first 2 months of treatment with FOLFIRINOX and returns for his first response assessment.  Chemotherapy is gone relatively well for him with a moderate amount of diarrhea and initially a lot of nausea and some vomiting but that seems to be doing better now with adjustments in his antiemetics.  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  He is having problems now for a few days with a persistent cough and runny nose.  He has no known Covid exposures and has been vaccinated.  He has had some night sweats but no susan fevers.  He tells me his wife's depression has gotten significantly worse with his illness and he is worried about how she is coping with this.    On exam he appears generally well but is clearly coughing and has conjunctival injection and nasal erythema.    I personally reviewed his CT scan from a few days ago went over the results with him.  He does not have any new sites of disease.  Both his primary and his liver lesions are just a little bit bigger.  His CA 19-9 level which was about 1200 at the start of therapyIs down to 457.  The rest of his lab work shows normal electrolytes and renal function; mildly depressed but improved albumin of 3.1 with normal bilirubin and liver enzymes; and mild cytopenias as expected with his chemotherapy.    Assessment/plan:  1.  Metastatic pancreatic cancer in a patient with an excellent performance status.  He has minor increases in his tumor on his scans but his tumor marker is improved and clinically his overall condition is roughly stable.  After a lot of discussion today  we decided we would try 1 more month of his current therapy before declaring it a failure.  2.  Upper respiratory infection.  He subsequently had a Covid test that was negative and I think this represents some other viral infection that requires only symptomatic management.  3.  Emotional distress/depression and his wife.  We discussed this some today and for now he thinks they have sufficient psychiatric follow-up and other resources to manage this.  Encouraged him to keep us apprised as we would have additional resources we could help with in this regard.        Luisito Kemp MD

## 2021-07-26 NOTE — PROGRESS NOTES
Markus Stallworth returns today in follow-up of metastatic pancreatic cancer.    He was diagnosed little over 2 months ago with a large tumor in the tail of his pancreas with associated liver metastasis.  He has now received his first 2 months of treatment with FOLFIRINOX and returns for his first response assessment.  Chemotherapy is gone relatively well for him with a moderate amount of diarrhea and initially a lot of nausea and some vomiting but that seems to be doing better now with adjustments in his antiemetics.  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  He is having problems now for a few days with a persistent cough and runny nose.  He has no known Covid exposures and has been vaccinated.  He has had some night sweats but no susan fevers.  He tells me his wife's depression has gotten significantly worse with his illness and he is worried about how she is coping with this.    On exam he appears generally well but is clearly coughing and has conjunctival injection and nasal erythema.    I personally reviewed his CT scan from a few days ago went over the results with him.  He does not have any new sites of disease.  Both his primary and his liver lesions are just a little bit bigger.  His CA 19-9 level which was about 1200 at the start of therapyIs down to 457.  The rest of his lab work shows normal electrolytes and renal function; mildly depressed but improved albumin of 3.1 with normal bilirubin and liver enzymes; and mild cytopenias as expected with his chemotherapy.    Assessment/plan:  1.  Metastatic pancreatic cancer in a patient with an excellent performance status.  He has minor increases in his tumor on his scans but his tumor marker is improved and clinically his overall condition is roughly stable.  After a lot of discussion today we decided we would try 1 more month of his current therapy before declaring it  a failure.  2.  Upper respiratory infection.  He subsequently had a Covid test that was negative and I think this represents some other viral infection that requires only symptomatic management.  3.  Emotional distress/depression and his wife.  We discussed this some today and for now he thinks they have sufficient psychiatric follow-up and other resources to manage this.  Encouraged him to keep us apprised as we would have additional resources we could help with in this regard.

## 2021-07-27 NOTE — PROGRESS NOTES
This is a recent snapshot of the patient's [facility] medical record.  For current drug dose and complete information and questions, call [phone] or In Basket pool, fv home infusion ([id number])  CSN Number:  [id number]

## 2021-08-02 NOTE — PROGRESS NOTES
This is a recent snapshot of the patient's [facility] medical record.  For current drug dose and complete information and questions, call [phone] or In Basket pool, fv home infusion ([id number])  CSN Number:  [id number]

## 2021-08-02 NOTE — PROGRESS NOTES
"Infusion Nursing Note:  Markus Stallworth presents today for C5D1 Folfirinox.    Patient seen by provider today: No   present during visit today: Not Applicable.    Note: Pt c/o ongoing cough x 1 month, mentioned to Dr Kemp at last appt, pt requesting an inhaler.  Pt asking this RN to message Viridiana's PA ( Merle Freeman) and ask for an inhaler .Pt denies any SOB, just ongoing cough.  He doesn't like to take the cough medicine with codeine because he feels it doesn't help and makes him feel \"funny.\"  States his appetite is decreased, some nausea that is improved with Zofran.  States he has diarrhea that improves with Immodium, says if he didn't have the diarrhea he would be constipated so doesn't always mind \"cleaning out\" because he otherwise would be too gassy.  See flow sheet for assessment.  Message sent to Dr Kemp and TANYA Smith for inhaler, pt aware.      Intravenous Access:  Implanted Port.    Treatment Conditions:  Lab Results   Component Value Date    HGB 11.2 08/02/2021    HGB 11.9 07/06/2021     Lab Results   Component Value Date    WBC 3.7 08/02/2021    WBC 4.2 07/06/2021      Lab Results   Component Value Date    ANEU 2.7 07/06/2021     Lab Results   Component Value Date     08/02/2021     07/06/2021      Lab Results   Component Value Date     08/02/2021     07/06/2021                   Lab Results   Component Value Date    POTASSIUM 3.4 08/02/2021    POTASSIUM 3.5 07/06/2021           No results found for: MAG         Lab Results   Component Value Date    CR 0.78 08/02/2021    CR 0.88 07/06/2021                   Lab Results   Component Value Date    CHRISTINA 8.1 08/02/2021    CHRISTINA 8.3 07/06/2021                Lab Results   Component Value Date    BILITOTAL 0.2 08/02/2021    BILITOTAL 0.3 07/06/2021           Lab Results   Component Value Date    ALBUMIN 2.8 08/02/2021    ALBUMIN 2.8 07/06/2021                    Lab Results   Component Value Date    ALT 31 08/02/2021 " "   ALT 32 07/06/2021           Lab Results   Component Value Date    AST 26 08/02/2021    AST 24 07/06/2021       Results reviewed, labs MET treatment parameters, ok to proceed with treatment.      Post Infusion Assessment:  Patient tolerated infusion without incident.  Blood return noted pre and post infusion.  Site patent and intact, free from redness, edema or discomfort.  No evidence of extravasations.  Prior to discharge: Port is secured in place with tegaderm and flushed with 10cc NS with positive blood return noted.  Continuous home infusion Dosi-Fuser pump connected.    All connectors secured in place and clamps taped open.    Pump started, \"running\" noted on display (CADD): Not Applicable.  Pump Connection double checked with Chelsea Mehta RN.  Patient instructed to call our clinic or [facility] with any questions or concerns at home.  Patient verbalized understanding.    Patient set up for pump disconnect at home with [facility] on Wednesday,8/4/21 at 11:30am.  Appointment set up with Jacqueline.        Discharge Plan:   Patient discharged in stable condition accompanied by: self.  Departure Mode: Ambulatory.  Pt will RTC 8/16/21 for C6D1 Folfirinox.      Julio Cesar Gil RN                      "

## 2021-08-02 NOTE — PROGRESS NOTES
Power Port needle left for access for treatment, Sterile technique performed and maintained. Patient tolerated procedure well. Tubes drawn in rainbow order: red, green, purple. Transparent dressing placed with use of tegaderm.    Leona Ahn RN  [facility] [place]

## 2021-08-11 NOTE — PROGRESS NOTES
This is a recent snapshot of the patient's [facility] medical record.  For current drug dose and complete information and questions, call [phone] or In Basket pool, fv home infusion ([id number])  CSN Number:  [id number]

## 2021-08-16 NOTE — PROGRESS NOTES
Power Port needle left for access for treatment, Sterile technique performed and maintained. Patient tolerated procedure well. Tubes drawn in rainbow order: red, green, purple. Transparent dressing placed with use of tegaderm.    Leona Ahn RN  [facility]/Infusion [place]

## 2021-08-16 NOTE — PROGRESS NOTES
"Infusion Nursing Note:  Markus Stallworth presents today for Folfirinox.    Patient seen by provider today: No   present during visit today: Not Applicable.    Note: reports diaphoresis on previous infusions.  Atropine given pre - Irinotecan.  Patient reports last cycle going better than previous in terms of diarrhea/n/v.  Did not need to take Immodium.  Did use antiemetics.  [facility] Irinotecan patient c/o abd cramping and tongue felt tingly and \"thick.\"  2nd dose atropine given.  abd cramping resolved.  Patient states he always has tingling during infusion, this symptom did not resolve but did not worsen.      Intravenous Access:  Implanted Port.    Treatment Conditions:  Results reviewed, labs MET treatment parameters, ok to proceed with treatment.      Post Infusion Assessment:  Patient tolerated infusion well with the exception of some tongue tingling       Discharge Plan:   [facility] notified of disconnect time.  .      NEMO LEAL RN                      "

## 2021-08-19 NOTE — PROGRESS NOTES
This is a recent snapshot of the patient's [facility] medical record.  For current drug dose and complete information and questions, call [phone] or In Basket pool, fv home infusion ([id number])  CSN Number:  [id number]

## 2021-08-20 NOTE — PROGRESS NOTES
This is a recent snapshot of the patient's [facility] medical record.  For current drug dose and complete information and questions, call [phone] or In Basket pool, fv home infusion ([id number])  CSN Number:  [id number]

## 2021-08-30 NOTE — NURSING NOTE
Oncology Rooming Note    August 30, 2021 12:58 PM   Markus Stallworth is a 61 year old male who presents for:    Chief Complaint   Patient presents with     Oncology Clinic Visit     [facility] RETURN - PANCREATIC CANCER     Initial Vitals: /74 (BP Location: Right arm, Patient Position: Chair, Cuff Size: Adult Regular)   Pulse 70   Temp 98  F (36.7  C)   Resp 16   Ht 1.829 m (6')   Wt 86.2 kg (190 lb)   SpO2 97%   BMI 25.77 kg/m   Estimated body mass index is 25.77 kg/m  as calculated from the following:    Height as of this encounter: 1.829 m (6').    Weight as of this encounter: 86.2 kg (190 lb). Body surface area is 2.09 meters squared.  Mild Pain (2) Comment: Data Unavailable   No LMP for male patient.  Allergies reviewed: Yes  Medications reviewed: Yes    Medications: Medication refills not needed today.  Pharmacy name entered into [employer]: [id number] - [place] [facility] [address]    Clinical concerns: New pain in LUQ rated at 2/10 today.     Dr. Kemp was notified.      Darwin Nobles LPN

## 2021-08-30 NOTE — PROGRESS NOTES
I am seeing Markus Barry today in follow-up of metastatic pancreatic cancer.    He presented a few months ago with a large tumor in the tail of his pancreas and liver metastasis.  He has now completed 3 months of FOLFIRINOX and is here for another response assessment.  2 months he had some minor evidence of progression on his scan but a markedly improved tumor marker and so we have done another month and are reassessing his response today.  Overall he thinks in the last couple of weeks he has declined a little bit he notes some mild but new left upper quadrant pain that comes and goes at random.  Continues to lose weight and just does not have much appetite.  He notes that he is losing significant muscle mass but gaining some fat around his abdomen.  He still able to work full-time and his spirits are still quite good.  The remainder of his complete review of systems otherwise unremarkable.  He tells me his wife is gotten a new psychiatrist and had some adjustments in her depression meds and seems to be doing better with her depression.    Physical exam Mr. Gage is complete alopecia but otherwise appears well.    I personally reviewed his CT scan and went over the images directly with him.  The tumor in the tail of his pancreas is increased a little bit more from last time and is clearly increased from his baseline scan.  The largest metastasis in the left lobe of the liver is clearly bigger as well.  He has another smaller liver metastasis which actually looks little bit better.  His CA 19-9 today is gone down just a little bit more at 242.  Rest of his lab work shows his electrolytes and renal function to be normal.  His albumin is depressed but stable at 3.1.  His bilirubin and liver enzymes are normal.  He has mild thrombocytopenia and normocytic anemia.    Assessment/plan: Metastatic pancreatic cancer in a patient with a ECOG 1 performance status with clear radiographic progression in spite of the improved  tumor marker and modest symptomatic progression.  We decided today its time to discontinue the FOLFIRINOX and move onto second line treatment.  We discussed a standard regimen of gemcitabine and Abraxane and I reviewed with him the expected toxicity.  He expressed a good understanding wish to proceed with the outlined therapy.  We also spent some time talking about what might come next after this and encouraged him in his thinking about 1 pros and cons of pursuing early phase studies versus spending time at home with his family.    Total time by me today spent on the above visit, review of imaging and prior records, ordering, coordination of care and documentation was 75 minutes.

## 2021-08-30 NOTE — LETTER
8/30/2021         RE: Markus Stallworth  [address]  [facility] [address]          I am seeing Markus Barry today in follow-up of metastatic pancreatic cancer.    He presented a few months ago with a large tumor in the tail of his pancreas and liver metastasis.  He has now completed 3 months of FOLFIRINOX and is here for another response assessment.  2 months he had some minor evidence of progression on his scan but a markedly improved tumor marker and so we have done another month and are reassessing his response today.  Overall he thinks in the last couple of weeks he has declined a little bit he notes some mild but new left upper quadrant pain that comes and goes at random.  Continues to lose weight and just does not have much appetite.  He notes that he is losing significant muscle mass but gaining some fat around his abdomen.  He still able to work full-time and his spirits are still quite good.  The remainder of his complete review of systems otherwise unremarkable.  He tells me his wife is gotten a new psychiatrist and had some adjustments in her depression meds and seems to be doing better with her depression.    Physical exam Mr. Gage is complete alopecia but otherwise appears well.    I personally reviewed his CT scan and went over the images directly with him.  The tumor in the tail of his pancreas is increased a little bit more from last time and is clearly increased from his baseline scan.  The largest metastasis in the left lobe of the liver is clearly bigger as well.  He has another smaller liver metastasis which actually looks little bit better.  His CA 19-9 today is gone down just a little bit more at 242.  Rest of his lab work shows his electrolytes and renal function to be normal.  His albumin is depressed but stable at 3.1.  His bilirubin and liver enzymes are normal.  He has mild thrombocytopenia and normocytic anemia.    Assessment/plan: Metastatic pancreatic cancer in a patient with a ECOG 1  performance status with clear radiographic progression in spite of the improved tumor marker and modest symptomatic progression.  We decided today its time to discontinue the FOLFIRINOX and move onto second line treatment.  We discussed a standard regimen of gemcitabine and Abraxane and I reviewed with him the expected toxicity.  He expressed a good understanding wish to proceed with the outlined therapy.  We also spent some time talking about what might come next after this and encouraged him in his thinking about 1 pros and cons of pursuing early phase studies versus spending time at home with his family.    Total time by me today spent on the above visit, review of imaging and prior records, ordering, coordination of care and documentation was 75 minutes.        Luisito Kemp MD

## 2021-08-31 NOTE — PATIENT INSTRUCTIONS
Contact Numbers    [facility]: [phone]    Call with chills and/or temperature greater than or equal to 100.5, uncontrolled nausea/vomiting, diarrhea, constipation, dizziness, shortness of breath, chest pain, bleeding, unexplained bruising, or any new/concerning symptoms, questions/concerns.     If you are having any concerning symptoms or wish to speak to a provider before your next infusion visit, please call your care coordinator or triage to notify them so we can adequately serve you.       After Hours: [phone]    If after hours, weekends, or holidays, call main hospital  and ask for Oncology doctor on call.       August 2021 Sunday Monday Tuesday Wednesday Thursday Friday Saturday   1     2    LAB CENTRAL   8:00 AM   (15 min.)   NURSE ONLY CANCER CENTER   [facility]    INFUSION 6 HR (360 MIN)   8:30 AM   (360 min.)   BAY 8 INFUSION   [facility] 3     4     5     6     7       8     9     10     11     12     13     14       15     16    LAB CENTRAL   7:30 AM   (15 min.)   NURSE ONLY CANCER CENTER   [facility]    INFUSION 6 HR (360 MIN)   8:00 AM   (360 min.)   [facility] INF[facility]ION   [facility] 17     18     19     20     21       22     23    NURSE ONLY   8:40 AM   (30 min.)   MG IMAGING NURSE   [facility]    CT CHEST ABDOMEN PELVIS WWO   9:00 AM   (20 min.)   MGCT1   [facility] 24     25     26     27     28       29     30    RETURN  12:45 PM   (30 min.)   Luisito Kemp MD   [facility] 31    LAB CENTRAL   8:15 AM   (15 min.)   [facility] LAB DRAW   [facility]    ONC INFUSION 2 HR (120 MIN)   9:00 AM   (120 min.)   [facility] ONC INFUSION NURSE   [facility]                                 September 2021 Sunday  Monday Tuesday Wednesday Thursday Friday Saturday                  1     2     3     4       5     6     7     8     9     10     11       12     13     14    LAB CENTRAL   8:30 AM   (15 min.)   NURSE ONLY CANCER CENTER   [facility]    INFUSION 2 HR (120 MIN)   9:00 AM   (120 min.)   [facility] INFUSION   [facility] 15     16     17     18       19     20     21     22     23     24     25       26     27    LAB CENTRAL   9:00 AM   (15 min.)   NURSE ONLY CANCER CENTER   [facility]    INFUSION 2 HR (120 MIN)   9:30 AM   (120 min.)   [facility] INFUSION   [facility] 28     29     30                               Lab Results:  Recent Results (from the past 12 hour(s))   Comprehensive metabolic panel    Collection Time: 08/31/21  8:51 AM   Result Value Ref Range    Sodium 142 133 - 144 mmol/L    Potassium 3.7 3.4 - 5.3 mmol/L    Chloride 109 94 - 109 mmol/L    Carbon Dioxide (CO2) 28 20 - 32 mmol/L    Anion Gap 5 3 - 14 mmol/L    Urea Nitrogen 18 7 - 30 mg/dL    Creatinine 0.82 0.66 - 1.25 mg/dL    Calcium 9.1 8.5 - 10.1 mg/dL    Glucose 109 (H) 70 - 99 mg/dL    Alkaline Phosphatase 95 40 - 150 U/L    AST 29 0 - 45 U/L    ALT 26 0 - 70 U/L    Protein Total 6.9 6.8 - 8.8 g/dL    Albumin 3.1 (L) 3.4 - 5.0 g/dL    Bilirubin Total 0.6 0.2 - 1.3 mg/dL    GFR Estimate >90 >60 mL/min/1.73m2   CBC with platelets and differential    Collection Time: 08/31/21  8:51 AM   Result Value Ref Range    WBC Count 3.8 (L) 4.0 - 11.0 10e3/uL    RBC Count 4.15 (L) 4.40 - 5.90 10e6/uL    Hemoglobin 12.7 (L) 13.3 - 17.7 g/dL    Hematocrit 37.5 (L) 40.0 - 53.0 %    MCV 90 78 - 100 fL    MCH 30.6 26.5 - 33.0 pg    MCHC 33.9 31.5 - 36.5 g/dL    RDW 16.8 (H) 10.0 - 15.0 %    Platelet Count 126 (L) 150 - 450 10e3/uL    % Neutrophils 49 %    % Lymphocytes 30 %    % Monocytes 19 %    % Eosinophils 1 %    % Basophils 1 %    %  Immature Granulocytes 0 %    NRBCs per 100 WBC 0 <1 /100    Absolute Neutrophils 1.9 1.6 - 8.3 10e3/uL    Absolute Lymphocytes 1.1 0.8 - 5.3 10e3/uL    Absolute Monocytes 0.7 0.0 - 1.3 10e3/uL    Absolute Eosinophils 0.0 0.0 - 0.7 10e3/uL    Absolute Basophils 0.0 0.0 - 0.2 10e3/uL    Absolute Immature Granulocytes 0.0 <=0.0 10e3/uL    Absolute NRBCs 0.0 10e3/uL

## 2021-08-31 NOTE — NURSING NOTE
Chief Complaint   Patient presents with     Port Draw     Labs drawn via PORT by RN in lab. VS taken.      Susan Ly RN

## 2021-08-31 NOTE — PROGRESS NOTES
Infusion Nursing Note:  Markus Stallworth presents today for Cycle 1, day 1 Abraxane and Gemzar.    Patient seen by provider today: No    Note:  New chemotherapy patient teaching done previously per patient.  All medications reviewed prior to administration and all patient's questions answered.  Pt denies any changes to allergies and medications overnight. Patient instructed to call triage with chills and/or temperature greater than or equal to 100.5, uncontrolled nausea/vomiting, diarrhea, constipation, dizziness, shortness of breath, chest pain, bleeding, unexplained bruising, or any new/concerning symptoms, questions/concerns.  Pt did not request or require any intervention for pain today.  Pt declined to collect COVID test today/has completed vaccination.    Intravenous Access:  Implanted Port.    Treatment Conditions:  Lab Results   Component Value Date    HGB 12.7 (L) 08/31/2021    WBC 3.8 (L) 08/31/2021    ANEU 2.7 07/06/2021    ANEUTAUTO 1.9 08/31/2021     (L) 08/31/2021      Lab Results   Component Value Date     08/31/2021    POTASSIUM 3.7 08/31/2021    CR 0.82 08/31/2021    CHRISTINA 9.1 08/31/2021    BILITOTAL 0.6 08/31/2021    ALBUMIN 3.1 (L) 08/31/2021    ALT 26 08/31/2021    AST 29 08/31/2021     Results reviewed, labs MET treatment parameters, ok to proceed with treatment.    Post Infusion Assessment:  Patient tolerated infusion without incident.  Blood return noted pre and post infusion.  Site patent and intact, free from redness, edema or discomfort.  No evidence of extravasations.  Access discontinued per protocol.    Discharge Plan:   Patient declined prescription refills.  Discharge instructions reviewed with: Patient.  Patient and/or family verbalized understanding of discharge instructions and all questions answered.  AVS to patient via [facility].  Patient will return 9/7/2021 for next appointment--yet to be scheduled 2/2 clinic volume limitations.  Pt and RNCC aware will be reviewed and  contact later this week/early next week with appt time.   Patient discharged in stable condition accompanied by: self.  Departure Mode: Ambulatory.    Renee Wild RN

## 2021-08-31 NOTE — TELEPHONE ENCOUNTER
"Patient says he had chemo treatment today (Gemzar) and Abraxane -- this is a new medication that Dr. Kemp put him on after discontinuing the Folfirinox.    Patient want says he used to take dexamethasone on days 2,3, and 4 with the previous chemo med. He wants to know if he need to take dexamethasone tomorrow since it is day 2 after his chemo or not.    Advised will route message to his clinic to call him tomorrow. Patient verbalized understanding.    Michel Vasquez RN/[facility]        Reason for Disposition    [1] Caller has NON-URGENT medication question about med that PCP prescribed AND [2] triager unable to answer question    Additional Information    Negative: MORE THAN A DOUBLE DOSE of a prescription or over-the-counter (OTC) drug    Negative: [1] DOUBLE DOSE (an extra dose or lesser amount) of over-the-counter (OTC) drug AND [2] any symptoms (e.g., dizziness, nausea, pain, sleepiness)    Negative: [1] DOUBLE DOSE (an extra dose or lesser amount) of prescription drug AND [2] any symptoms (e.g., dizziness, nausea, pain, sleepiness)    Negative: Took another person's prescription drug    Negative: [1] DOUBLE DOSE (an extra dose or lesser amount) of prescription drug AND [2] NO symptoms (Exception: a double dose of antibiotics)    Negative: Diabetes drug error or overdose (e.g., took wrong type of insulin or took extra dose)    Negative: [1] Request for URGENT new prescription or refill of \"essential\" medication (i.e., likelihood of harm to patient if not taken) AND [2] triager unable to fill per unit policy    Negative: [1] Prescription not at pharmacy AND [2] was prescribed by PCP recently    Negative: [1] Pharmacy calling with prescription questions AND [2] triager unable to answer question    Negative: [1] Caller has URGENT medication question about med that PCP or specialist prescribed AND [2] triager unable to answer question    Protocols used: MEDICATION QUESTION CALL-A-AH      "

## 2021-09-01 NOTE — TELEPHONE ENCOUNTER
[facility] Triage    Questions on Dexamethasone needing more for his upcoming treatment days 2-3-4.    Has one refill - advised Markus to  at [facility]/Pharmacy.    Left  at 828 am stating this and to call or MyChart with questions.

## 2021-09-08 NOTE — NURSING NOTE
Chief Complaint   Patient presents with     Port Draw     Labs drawn via PORT by RN in labs. VS taken.      Susan Ly RN

## 2021-09-08 NOTE — PATIENT INSTRUCTIONS
Contact Numbers  [facility]: [phone]    After Hours:  [phone]  Triage: [phone]    Please call the [facility] Triage line if you experience a temperature greater than or equal to 100.5, shaking chills, have uncontrolled nausea, vomiting and/or diarrhea, dizziness, shortness of breath, chest pain, bleeding, unexplained bruising, or if you have any other new/concerning symptoms, questions or concerns.     If it is after hours, weekends, or holidays, please call the main hospital  at  [phone] and ask to speak to the Oncology doctor on call.     If you are having any concerning symptoms or wish to speak to a provider before your next infusion visit, please call your care coordinator or triage to notify them so we can adequately serve you.     If you need a refill on a narcotic prescription or other medication, please call triage before your infusion appointment.         September 2021 Sunday Monday Tuesday Wednesday Thursday Friday Saturday                  1     2     3     4       5     6     7     8    LAB CENTRAL   8:15 AM   (15 min.)    [facility] LAB DRAW   [facility]    ONC INFUSION 2 HR (120 MIN)   9:00 AM   (120 min.)    ONC INFUSION NURSE   [facility] 9     10     11       12     13     14    LAB CENTRAL   8:30 AM   (15 min.)   NURSE ONLY CANCER [facility]    INFUSION 2 HR (120 MIN)   9:00 AM   (120 min.)   BAY 6 INFUSION   [facility]    VIDEO VISIT RETURN   9:30 AM   (30 min.)   Penny Acuna RD   [facility] 15     16     17     18       19     20     21     22     23     24     25       26     27    LAB CENTRAL   9:00 AM   (15 min.)   NURSE ONLY CANCER [facility]    INFUSION 2 HR (120 MIN)   9:30 AM   (120 min.)   BAY 5 INFUSION   [facility]  [facility] 28     29    RETURN  12:00 PM   (45 min.)   Merle Freeman PA-C   [facility] 30 October 2021 Sunday Monday Tuesday Wednesday Thursday Friday Saturday                            1     2       3     4    LAB CENTRAL   8:00 AM   (15 min.)   NURSE ONLY CANCER CENTER   [facility]    INFUSION 2 HR (120 MIN)   8:30 AM   (120 min.)   BAY 9 INFUSION   [facility] 5     6     7     8     9       10     11    LAB CENTRAL   8:30 AM   (15 min.)   NURSE ONLY [facility]    INFUSION 2 HR (120 MIN)   9:00 AM   (120 min.)   BAY 10 INFUSION   [facility] 12     13     14     15     16       17     18     19     20     21     22    NURSE ONLY   8:30 AM   (30 min.)   [facility] IMAGING NURSE   [facility]    CT CHEST ABDOMEN PELVIS WWO   9:00 AM   (20 min.)   MGCT1   [facility] 23       24     25     26     27     28     29     30       31                                                   Lab Results:  Recent Results (from the past 12 hour(s))   Extra Green Top (Lithium Heparin) Tube    Collection Time: 09/08/21  8:54 AM   Result Value Ref Range    Hold Specimen JIC    CBC with platelets and differential    Collection Time: 09/08/21  8:55 AM   Result Value Ref Range    WBC Count 3.5 (L) 4.0 - 11.0 10e3/uL    RBC Count 3.67 (L) 4.40 - 5.90 10e6/uL    Hemoglobin 11.0 (L) 13.3 - 17.7 g/dL    Hematocrit 32.8 (L) 40.0 - 53.0 %    MCV 89 78 - 100 fL    MCH 30.0 26.5 - 33.0 pg    MCHC 33.5 31.5 - 36.5 g/dL    RDW 16.2 (H) 10.0 - 15.0 %    Platelet Count 66 (L) 150 - 450 10e3/uL    % Neutrophils 44 %    % Lymphocytes 36 %    % Monocytes 15 %    % Eosinophils 3 %    % Basophils 1 %    % Immature Granulocytes 1 %    NRBCs per 100 WBC 0 <1 /100    Absolute Neutrophils 1.6  1.6 - 8.3 10e3/uL    Absolute Lymphocytes 1.3 0.8 - 5.3 10e3/uL    Absolute Monocytes 0.5 0.0 - 1.3 10e3/uL    Absolute Eosinophils 0.1 0.0 - 0.7 10e3/uL    Absolute Basophils 0.0 0.0 - 0.2 10e3/uL    Absolute Immature Granulocytes 0.0 <=0.0 10e3/uL    Absolute NRBCs 0.0 10e3/uL   Extra Red Top Tube    Collection Time: 09/08/21  8:56 AM   Result Value Ref Range    Hold Specimen JIC

## 2021-09-08 NOTE — PROGRESS NOTES
Infusion Nursing Note:  Markus Stallworth presents today for C1D8 Abraxae/Gemzar.    Patient seen by provider today: No   present during visit today: Not Applicable.    Note: Patient endorses taste issue accompanied with loss of appetite. Denies nausea/vomiting nor chest and abdominal discomfort. Patient had been trying one serving of fairlite 42gm protein once. Encourage patient to have 5-6 small portions of food but frequently. Encourage to push on fluids.Patient states that it is challenging to be able to consume calorie requirement daily. IB sent to Penny Yanes. No new complaint made. Otherwise well.    Patient currently doing salt rinses, symptoms improving. Refused any intervention for pain today.     As per Penny, she will call patient next week during his infusion at [facility]. Instruction given to patient. Verbalized understanding.       Intravenous Access:  Implanted Port.    Treatment Conditions:  Lab Results   Component Value Date    HGB 11.0 (L) 09/08/2021    WBC 3.5 (L) 09/08/2021    ANEU 2.7 07/06/2021    ANEUTAUTO 1.6 09/08/2021    PLT 66 (L) 09/08/2021      Lab Results   Component Value Date     08/31/2021    POTASSIUM 3.7 08/31/2021    CR 0.82 08/31/2021    CHRISTINA 9.1 08/31/2021    BILITOTAL 0.6 08/31/2021    ALBUMIN 3.1 (L) 08/31/2021    ALT 26 08/31/2021    AST 29 08/31/2021     Results reviewed, labs did NOT meet treatment parameters: See TORB.    TORB: 9/8/21/Dr. Kemp/Nerissa Leigh RN/ Plt 66, to proceed with Abraxane/Gemzar as planned.       Post Infusion Assessment:  Patient tolerated infusion without incident.  Blood return noted pre and post infusion.  Site patent and intact, free from redness, edema or discomfort.  No evidence of extravasations.  Access discontinued per protocol.       Discharge Plan:   Patient declined prescription refills.  Discharge instructions reviewed with: Patient.  Patient and/or family verbalized understanding of discharge instructions and all questions  answered.  AVS to patient via [facility].  Patient will return 9/14/21 for next appointment.   Patient discharged in stable condition accompanied by: self.  Departure Mode: Ambulatory.      STAN MONTIEL RN

## 2021-09-11 NOTE — TELEPHONE ENCOUNTER
Mouth sores after Chemo.session   # 2 . He is asking for Magic Mouth wash , reports this was advised by another MD.    RN writer paged ON Call for Dr. Kemp at   [facility].    Patient pharmacy is listed in chart.    Oncall Dr. Nunez called RN writer , and she  Reports she can order this Magic mouth Wash for patient. And reports she will call   The Pharmacist   Rn call to patient to let him know.about RX being sent to pharmacy..    Josefa Leong, RN RN  [facility] Triage/refill nurse      Reason for Disposition    Receiving chemotherapy or radiation therapy    Additional Information    Negative: Severe difficulty breathing (e.g., struggling for each breath, speaks in single words, stridor)    Negative: Sounds like a life-threatening emergency to the triager    Negative: Injury to tooth or teeth    Negative: Injury to mouth    Negative: Canker sore suspected (i.e., aphthous ulcer) in the mouth    Negative: Cold sore suspected (i.e., fever blister sore) on the outer lip    Negative: Tooth is painful or swelling around a tooth    Negative: Mouth is painful    Negative: Throat is painful    Negative: Tongue swelling    Negative: Lip swelling    Negative: [1] Drooling or spitting out saliva (because can't swallow) AND [2] new onset    Negative: [1] Bleeding from mouth AND [2] won't stop after 10 minutes of direct pressure    Negative: Electrical burn of mouth    Negative: Chemical burn of mouth    Negative: [1] Difficulty breathing AND [2] not severe    Negative: Patient sounds very sick or weak to the triager    Protocols used: MOUTH SYMPTOMS-A-AH

## 2021-09-14 NOTE — PROGRESS NOTES
"Infusion Nursing Note:  Markus Stallworth presents today for C1,D15 of Abraxane/Gemzar  - HELD - defer for 1 week.  Patient seen by provider today: No   present during visit today: Not Applicable.    Note: Dr Kemp paged for WBC 1.9, ANC 0.8, PLT 76 - pt denies any problems, afebrile, no s/s of infection. Pt requesting to \"go forward with treatment, please tell the doctor that I want my treatment today, are you going to advocate for me?\" Educated pt that it was up to the MD to decide about treatment today  - will update MD with status.     Dr Kemp paged with no response - Paged NP after approximately 30 minutes.    Per Merle Freeman,NP - HOLD chemotherapy today - If pt would like to push everything back by a week and return on 09/21/2021 that's is fine.  Ok per NP to return in a week or wait 2 weeks to see her on 09/27/2021. Pt states he would like to return in 1 week and continue as originally ordered.  Pt instructed to go to checkout for appointment changes.    Pt expressed frustrations about not having chemotherapy today - \"You do realize that I have stage 4 Pancreatic Cancer and I need this in order to prolong my life?\".  RN verbalized understanding of pt's frustrations, but also emphasized to the patient the current risk of Neutropenic Fever and it's potential complications - educated pt on  when to call the clinic for any s/s of infection, fever of 100.4 or greater or any negative change in status Pt verbalized understanding.        Intravenous Access:  Implanted Port.    Treatment Conditions:  Lab Results   Component Value Date    HGB 10.4 (L) 09/14/2021    WBC 1.9 (LL) 09/14/2021    ANEU 0.8 (L) 09/14/2021    ANEUTAUTO 1.6 09/08/2021    PLT 76 (L) 09/14/2021      Results reviewed, labs did NOT meet treatment parameters: ANC 0.8.      Post Infusion Assessment:  Site patent and intact, free from redness, edema or discomfort.  Access discontinued per protocol.       Discharge Plan:   Return on " 09/20/2021 for C1,D15  - will follow up with Merle Freeman NP on 10/04/2021 for C2,D1 of Gemzar/Abraxane.  Discharge instructions reviewed with: Patient.  Patient and/or family verbalized understanding of discharge instructions and all questions answered.  Patient discharged in stable condition accompanied by: self.  Departure Mode: Ambulatory.      Chelsea Mehta RN

## 2021-09-14 NOTE — PROGRESS NOTES
"Patient's name and  were verified.  See Doc Flowsheet - IV assess for details.  IVAD accessed with 20G 3/4\" krueger gripper plus needle  blood return positive: YES  Site without redness, tenderness or swelling: YES  flushed with 20cc NS and 5cc 100u/ml heparin  Needle: Left accessed for infusion  Comments: Labs drawn.  Patient tolerated procedure without incident.    Briana Gutiérrez RN, BSN, OCN    "

## 2021-09-16 NOTE — PROGRESS NOTES
RN Care Coordination Note  Received call from [facility] pharmacy. Patient was prescribed magic mouthwash for mouth related to chemotherapy. Per pharmacist, not covered by insurance. Request change in RX to viscous lidocaine. Patient has purchased other ingredients and will plan to mix at home by direction of pharmacist.     Reviewed with leigh ann Clemente to send.       Mitali Will RN, BSN, OCN   RN Care Coordinator   [facility]

## 2021-09-20 NOTE — PROGRESS NOTES
RN Care Coordination Note  Received call from patient stating he is needing refill on Zyprexa. Has been using at night and feels it is working well to help with nausea and sleep.     Patient also voices concern that he feels he is not having CA 19-9 drawn with each infusion, as he was with previous treatment plan. Reviewed rationale for tumor markers. RNCC with review with Dr Kemp and NEELAM for adding to D1 of each cycle.     Patient voiced understanding and had no further questions at this time.            Mitali Will RN, BSN, OCN   RN Care Coordinator   [facility]

## 2021-09-20 NOTE — PROGRESS NOTES
Port needle left for access for treatment, Sterile technique performed and maintained. Patient tolerated procedure well. Tubes drawn in rainbow order: red, green, purple. Transparent dressing placed with use of tegaderm.    Leona Ahn RN  [facility]/Infusion [place]

## 2021-09-20 NOTE — PROGRESS NOTES
Infusion Nursing Note:  Markus Stallworth presents today for C1,D15 of Gemzar/Abraxane  Patient seen by provider today: No   present during visit today: Not Applicable.    Note: Pt was deferred last week for Neutropenia - Afebrile, denies any problems with infection, no changes, appetite and fatigue have improved, neuropathy to the bilateral toes has stayed the same - pt anxious to qualify for his  infusion today. ANC improved to 1.2 today, but still below the 1.5 ANC parameters per chemotherapy orders - Paged TANYA Irvin for pt status and update at 0830 am. Dr Kemp paged at 0853 am. Per Merle Freeman NP at 0900 - Ok to treat today.      Intravenous Access:  Implanted Port.    Treatment Conditions:  Lab Results   Component Value Date    HGB 10.5 (L) 09/20/2021    WBC 2.8 (L) 09/20/2021    ANEU 0.8 (L) 09/14/2021    ANEUTAUTO 1.2 (L) 09/20/2021     09/20/2021          Post Infusion Assessment:  Patient tolerated infusion without incident.  Blood return noted pre and post infusion.  Site patent and intact, free from redness, edema or discomfort.  No evidence of extravasations.  Access discontinued per protocol.       Discharge Plan:   Return 10/04/2021 with Merle Freeman.PA - video visit and then infusion at [facility] for C2,D1 of Gemzar/Abraxane, 10/11/2021 C2,D8.  Discharge instructions reviewed with: Patient.  Patient and/or family verbalized understanding of discharge instructions and all questions answered.  Patient discharged in stable condition accompanied by: self.  Departure Mode: Ambulatory.      Chelsea Mehta, RN

## 2021-09-21 NOTE — LETTER
9/21/2021         RE: Markus Stallworth  [address]  [place] [address]        Dear Colleague,    Thank you for referring your patient, Markus Stallworth, to the [facility] [place]. Please see a copy of my visit note below.    Video-Visit Details     Type of service:  Video Visit     Video Start Time (time video started): 11:30AM     Video End Time (time video stopped): 12:00PM    Originating Location (pt. Location): Home     Distant Location (provider location):  [facility]      Mode of Communication:  Video Conference via [facility] - REASSESSMENT NOTE   EVALUATION OF PREVIOUS PLAN OF CARE:   Time Spent: 30minutes  Visit Type: Video  Pt accompanied by: self  Referring Physician: Viridiana 6/4/21  C25.2 (ICD-10-CM) - Malignant neoplasm of tail of pancreas (H)    Chemotherapy: Folfirinox started 6/7/21 - switched to 2nd line of treatment 8/2021 (Gemzar/abraxane)  Monitoring from previous assessment:   -Food intake - Markus's appetite has been decreased due to mouth sores and dygeusia since he started his new line of chemo. He admits to not eating 5-6 smaller meals as it's so difficult or him   B - 3 bb pancakes, sausage alejandro and egg OR rice chex with bb (2-3 zenpep)  L - leftovers - spaghetti and meatballs  D - Meat, grain and +/- vegetable  Snacks - not much of a snacker; peanut dick protein bar  -Liquid meal replacement or supplement - Fairlife shakes with 42 g protein 2-3 times/week  -Weight trends - down ~12 lb x past 3-4 months since previous RD visit  Wt Readings from Last 12 Encounters:   09/20/21 84.8 kg (187 lb)   09/14/21 86.5 kg (190 lb 11.2 oz)   09/08/21 85.7 kg (189 lb)   08/31/21 81.2 kg (179 lb)   08/30/21 86.2 kg (190 lb)   08/16/21 88.7 kg (195 lb 8 oz)   08/02/21 89.7 kg (197 lb 12.8 oz)   07/26/21 85.8 kg (189 lb 1.6 oz)   07/19/21 89.5 kg (197 lb 6.4 oz)   07/06/21 88.8 kg (195 lb 12.8 oz)   06/22/21 89.4 kg (197  lb 3.2 oz)   06/07/21 90.5 kg (199 lb 8 oz)     Previous Goals:   1.  Aim for 5-6 small frequent meals - not met  2.  Aim for 2700kcal and 135g protein/day - not met  3. Weight maintenance - not met  Evaluation: Not met   Previous Nutrition Diagnosis:   Food and nutrition-related knowledge deficit related to nutrition needs with cancer/cancer treatment as evidenced by pt with questions regarding food choices with cancer and EPI  Evaluation: Completed   NEW FINDINGS:   Weight loss   CURRENT NUTRITION DIAGNOSIS   Inadequate oral intake related to decreased appetite with mucositis and dysgeusia as evidenced by pt report and wt down 12 lb x past 3 months   INTERVENTIONS   Recommendations / Nutrition Prescription   1. ONS shake (Sape) - take 1 /day  2. In addition to shake, add 2 snacks throughout the day with >250 patricia, 15g protein each.   Implementation  Composition of Meals and Snacks and Medical Food Supplement - reviewed ways to add snacks/meals throughout the day.  Reviewed snack/meal options. Reviewed ONS options and alternative protein bar options that may be more palatable and higher in calories.   Goals   1. Aim for at least 2500 calories and 100g protein/day   2. Weight stability 187-190 lb     Follow up/Monitoring:   Food intake and Weight          Again, thank you for allowing me to participate in the care of your patient.        Sincerely,        Penny Yanes RD

## 2021-09-21 NOTE — PROGRESS NOTES
Video-Visit Details     Type of service:  Video Visit     Video Start Time (time video started): 11:30AM     Video End Time (time video stopped): 12:00PM    Originating Location (pt. Location): Home     Distant Location (provider location):  [facility] NUTRITION SERVICES      Mode of Communication:  Video Conference via [facility]    CLINICAL NUTRITION SERVICES - REASSESSMENT NOTE   EVALUATION OF PREVIOUS PLAN OF CARE:   Time Spent: 30minutes  Visit Type: Video  Pt accompanied by: self  Referring Physician: Viridiana 6/4/21  C25.2 (ICD-10-CM) - Malignant neoplasm of tail of pancreas (H)    Chemotherapy: Folfirinox started 6/7/21 - switched to 2nd line of treatment 8/2021 (Gemzar/abraxane)  Monitoring from previous assessment:   -Food intake - Markus's appetite has been decreased due to mouth sores and dygeusia since he started his new line of chemo. He admits to not eating 5-6 smaller meals as it's so difficult or him   B - 3 bb pancakes, sausage alejandro and egg OR rice chex with bb (2-3 zenpep)  L - leftovers - spaghetti and meatballs  D - Meat, grain and +/- vegetable  Snacks - not much of a snacker; peanut dick protein bar  -Liquid meal replacement or supplement - Fairlife shakes with 42 g protein 2-3 times/week  -Weight trends - down ~12 lb x past 3-4 months since previous RD visit  Wt Readings from Last 12 Encounters:   09/20/21 84.8 kg (187 lb)   09/14/21 86.5 kg (190 lb 11.2 oz)   09/08/21 85.7 kg (189 lb)   08/31/21 81.2 kg (179 lb)   08/30/21 86.2 kg (190 lb)   08/16/21 88.7 kg (195 lb 8 oz)   08/02/21 89.7 kg (197 lb 12.8 oz)   07/26/21 85.8 kg (189 lb 1.6 oz)   07/19/21 89.5 kg (197 lb 6.4 oz)   07/06/21 88.8 kg (195 lb 12.8 oz)   06/22/21 89.4 kg (197 lb 3.2 oz)   06/07/21 90.5 kg (199 lb 8 oz)     Previous Goals:   1.  Aim for 5-6 small frequent meals - not met  2.  Aim for 2700kcal and 135g protein/day - not met  3. Weight maintenance - not met  Evaluation: Not met   Previous Nutrition  Diagnosis:   Food and nutrition-related knowledge deficit related to nutrition needs with cancer/cancer treatment as evidenced by pt with questions regarding food choices with cancer and EPI  Evaluation: Completed   NEW FINDINGS:   Weight loss   CURRENT NUTRITION DIAGNOSIS   Inadequate oral intake related to decreased appetite with mucositis and dysgeusia as evidenced by pt report and wt down 12 lb x past 3 months   INTERVENTIONS   Recommendations / Nutrition Prescription   1. ONS shake (Colingo) - take 1 /day  2. In addition to shake, add 2 snacks throughout the day with >250 patricia, 15g protein each.   Implementation  Composition of Meals and Snacks and Medical Food Supplement - reviewed ways to add snacks/meals throughout the day.  Reviewed snack/meal options. Reviewed ONS options and alternative protein bar options that may be more palatable and higher in calories.   Goals   1. Aim for at least 2500 calories and 100g protein/day   2. Weight stability 187-190 lb     Follow up/Monitoring:   Food intake and Weight

## 2021-10-01 NOTE — PROGRESS NOTES
Oncology/Hematology Visit Note  Oct 4, 2021    Reason for Visit: follow up of metastatic pancreatic cancer    History of Present Illness: Markus Stallworth is a 61 year old male with metastatic pancreatic cancer. He initially presented with modest left upper quadrant pain that led to an ultrasound and then further imaging demonstrating the presence of a large mass in the tail of his pancreas with associated splenic vascular involvement with splenomegaly and liver metastases.  A biopsy of one of his liver lesions shows an adenocarcinoma consistent with pancreatic primary. Targeted gene fusions of ALK, ROS1, RET, NTRK1, and NTRK3, as well as MET e14 skipping mutations were not detected in the analyzed sample. His port was placed on 6/4/21. He started on treatment with 5FU, irinotecan, and oxaliplatin (FOLFIRINOX) on 6/7/21. He received 6 cycles, last on 8/16/21. CT CAP on 8/23/21 showed disease progression. He then started on treatment with Gemzar and Abraxane on 9/8/21. Due to neutropenia and thrombocytopenia, his treatment was changed from a day 1/8/15 every 28 day schedule to a day 1/15 every 28 day schedule with Neulasta. He is here today for routine follow-up prior to cycle 2 day 1 Gemzar/Abraxane.     Interval History:  -Around day 2.5-3, has had chills and body aches with each round of chemotherapy. Did not take any medication for the aches except took Theraflu once.   -Has had some nausea that started around day 3, associated with no appetite and a weird taste in his mouth.   -Neuropathy in feet in constant in toes and balls of feet. At times has felt off balance. Denies associated pain. Also, has neuropathy in fingertips, but less bothersome. Neuropathy is both numbness and tingling.   -Cold sensation is improving.    -Had mouth sores, using Magic Mouthwash prn.   -Eating and drinking fair. Has continued to lose weight. Has met with our dietician.   -Has not been exercising. Feels leg strength is low.   -Has not  been sleeping well intermittently. Has been taking Zyprexa.   -Has been continuing to work.   -Reports bowels are doing okay.   -Reports a good mood.    Current Outpatient Medications   Medication Sig Dispense Refill     Acetaminophen (TYLENOL) 325 MG CAPS 325 tablets by Oral or Feeding Tube route as needed (Patient not taking: Reported on 8/16/2021)       benzonatate (TESSALON) 100 MG capsule Take 1-2 capsules (100-200 mg) by mouth 3 times daily as needed for cough (Patient not taking: Reported on 8/16/2021) 30 capsule 1     diphenhydrAMINE-APAP, sleep, (TYLENOL PM EXTRA STRENGTH PO)        diphenoxylate-atropine (LOMOTIL) 2.5-0.025 MG tablet Take 1-2 tablets by mouth 4 times daily as needed for diarrhea 60 tablet 0     lidocaine (XYLOCAINE) 2 % solution Swish and swallow 10 mLs in mouth every 6 hours as needed (mouth sores) ; Max 8 doses/24 hour period. 250 mL 1     loperamide (IMODIUM) 2 MG capsule 2 caps at 1st sign of diarrhea & 1 cap every 2hrs until 12hrs diarrhea free. During night, 2 caps at bedtime & 2 caps every 4hrs until AM 60 capsule 3     magic mouthwash suspension (diphenhydrAMINE, lidocaine, aluminum-magnesium & simethicone) Swish and swallow 10 mLs in mouth every 6 hours as needed for mouth sores 250 mL 0     methocarbamol (ROBAXIN) 750 MG tablet TAKE 1 TABLET BY MOUTH EVERY 4 HOURS AS NEEDED (Patient not taking: Reported on 8/2/2021)       OLANZapine (ZYPREXA) 2.5 MG tablet Take 1 tablet (2.5 mg) by mouth At Bedtime 30 tablet 3     ondansetron (ZOFRAN-ODT) 8 MG ODT tab Take 1 tablet (8 mg) by mouth every 8 hours as needed for nausea 30 tablet 3     prochlorperazine (COMPAZINE) 10 MG tablet Take 1 tablet (10 mg) by mouth every 6 hours as needed (Nausea/Vomiting) 30 tablet 2     valACYclovir (VALTREX) 1000 mg tablet TAKE TWO PILLS THEN REPEAT ONE TIME IN 12 HOURS THEN STOP. REPEAT FOR FUTURE OUTBREAKS AS NEEDED. (Patient not taking: Reported on 8/2/2021)       ZENPEP [id number] units CPEP TAKE 2-3  CAPS WITH MEALS AND 1-2 CAPS WITH SNACKS 360 capsule 1     Objective:  General: patient appears well in no acute distress, alert and oriented, speech clear and fluid  Skin: no visualized rash or lesions on visualized skin  Resp: Appears to be breathing comfortably without accessory muscle usage, speaking in full sentences, no audible wheezes or cough.  Psych: Coherent speech, normal rate and volume, able to articulate logical thoughts, able to abstract reason, no tangential thoughts, no hallucinations or delusions  Patient's affect is appropriate.    Laboratory Data:   10/4/2021 08:20   Sodium 142   Potassium 3.8   Chloride 109   Carbon Dioxide 28   Urea Nitrogen 11   Creatinine 0.75   GFR Estimate >90   Calcium 8.8   Anion Gap 5   Albumin 3.2 (L)   Protein Total 6.8   Bilirubin Total 0.4   Alkaline Phosphatase 85   ALT 28   AST 21   Cancer Antigen 19-9 240 (H)   Glucose 96   WBC 5.9   Hemoglobin 11.2 (L)   Hematocrit 33.8 (L)   Platelet Count 168   RBC Count 3.68 (L)   MCV 92   MCH 30.4   MCHC 33.1   RDW 15.7 (H)   % Neutrophils 55   % Lymphocytes 22   % Monocytes 20   % Eosinophils 1   Absolute Basophils 0.1   % Basophils 1   Absolute Eosinophils 0.1   Absolute Immature Granulocytes 0.0   Absolute Lymphocytes 1.3   Absolute Monocytes 1.2   % Immature Granulocytes 1   Absolute Neutrophils 3.3   Absolute NRBCs 0.0   NRBCs per 100 WBC 0     I reviewed the above labs today.    Assessment and Plan:  Metastatic pancreatic cancer. Patient is currently on treatment with Gemzar and Abraxane, which he is tolerating reasonably well. Due to issues with neutropenia and thrombocytopenia, will plan to give treatment on a day 1/15 every 28 day cycle with Neulasta support. He will continue with cycle 2 today. He will have repeat imaging after 2 cycles. He is also interested in clinical trials. Discussed the [email or web address] website. Also, we may consider having him seen in our phase 1 clinical trial clinic.     Nausea.  Secondary to chemotherapy. Managed with Zyprexa. Also, has Compazine and Zofran available. Declines adding in IV Emend.     Peripheral neuropathy. Stable. Discussed that he may try acupuncture and okay to take a vitamin B complex supplement.     Fatigue. Recommend resuming daily exercise. Declines cancer rehab referral.     Pancreatic insufficiency. Patient will continue on pancreatic enzymes. He has met with our dietician this week.     Mucositis. He will continue to use Magic Mouthwash. Recommend salt/soda swishes.     Merle Freeman PA-C  [facility]  [address] [place] [address]  [phone]    60 minutes spent on the date of the encounter doing chart review, review of test results, interpretation of tests, patient visit and documentation

## 2021-10-04 PROBLEM — T45.1X5A CHEMOTHERAPY-INDUCED NEUTROPENIA (H): Status: ACTIVE | Noted: 2021-01-01

## 2021-10-04 PROBLEM — D70.1 CHEMOTHERAPY-INDUCED NEUTROPENIA (H): Status: ACTIVE | Noted: 2021-01-01

## 2021-10-04 NOTE — PROGRESS NOTES
Markus is a 61 year old who is being evaluated via a billable video visit.      How would you like to obtain your AVS? [email or web address]  If the video visit is dropped, the invitation should be resent by: Text to cell phone: [phone]  Will anyone else be joining your video visit? No      Video-Visit Details    Type of service:  Video Visit    Video Start Time: 7:07 AM    Video End Time:7:42 AM    Originating Location (pt. Location): Home    Distant Location (provider location):  [facility]     Platform used for Video Visit: Intense

## 2021-10-04 NOTE — PROGRESS NOTES
Right port accessed with a gripper needle, labs drawn without difficulty, Red/Green/Purple tube collected, flushed with saline and heparin, transparent dressing applied. See flow sheet.    Chelsea Mehta, RN

## 2021-10-04 NOTE — LETTER
10/4/2021         RE: Markus Stallworth  [address]  [facility] [address]      Oncology/Hematology Visit Note  Oct 4, 2021    Reason for Visit: follow up of metastatic pancreatic cancer    History of Present Illness: Markus Stallworth is a 61 year old male with metastatic pancreatic cancer. He initially presented with modest left upper quadrant pain that led to an ultrasound and then further imaging demonstrating the presence of a large mass in the tail of his pancreas with associated splenic vascular involvement with splenomegaly and liver metastases.  A biopsy of one of his liver lesions shows an adenocarcinoma consistent with pancreatic primary. Targeted gene fusions of ALK, ROS1, RET, NTRK1, and NTRK3, as well as MET e14 skipping mutations were not detected in the analyzed sample. His port was placed on 6/4/21. He started on treatment with 5FU, irinotecan, and oxaliplatin (FOLFIRINOX) on 6/7/21. He received 6 cycles, last on 8/16/21. CT CAP on 8/23/21 showed disease progression. He then started on treatment with Gemzar and Abraxane on 9/8/21. Due to neutropenia and thrombocytopenia, his treatment was changed from a day 1/8/15 every 28 day schedule to a day 1/15 every 28 day schedule with Neulasta. He is here today for routine follow-up prior to cycle 2 day 1 Gemzar/Abraxane.     Interval History:  -Around day 2.5-3, has had chills and body aches with each round of chemotherapy. Did not take any medication for the aches except took Theraflu once.   -Has had some nausea that started around day 3, associated with no appetite and a weird taste in his mouth.   -Neuropathy in feet in constant in toes and balls of feet. At times has felt off balance. Denies associated pain. Also, has neuropathy in fingertips, but less bothersome. Neuropathy is both numbness and tingling.   -Cold sensation is improving.    -Had mouth sores, using Magic Mouthwash prn.   -Eating and drinking fair. Has continued to lose weight. Has met with  our dietician.   -Has not been exercising. Feels leg strength is low.   -Has not been sleeping well intermittently. Has been taking Zyprexa.   -Has been continuing to work.   -Reports bowels are doing okay.   -Reports a good mood.    Current Outpatient Medications   Medication Sig Dispense Refill     Acetaminophen (TYLENOL) 325 MG CAPS 325 tablets by Oral or Feeding Tube route as needed (Patient not taking: Reported on 8/16/2021)       benzonatate (TESSALON) 100 MG capsule Take 1-2 capsules (100-200 mg) by mouth 3 times daily as needed for cough (Patient not taking: Reported on 8/16/2021) 30 capsule 1     diphenhydrAMINE-APAP, sleep, (TYLENOL PM EXTRA STRENGTH PO)        diphenoxylate-atropine (LOMOTIL) 2.5-0.025 MG tablet Take 1-2 tablets by mouth 4 times daily as needed for diarrhea 60 tablet 0     lidocaine (XYLOCAINE) 2 % solution Swish and swallow 10 mLs in mouth every 6 hours as needed (mouth sores) ; Max 8 doses/24 hour period. 250 mL 1     loperamide (IMODIUM) 2 MG capsule 2 caps at 1st sign of diarrhea & 1 cap every 2hrs until 12hrs diarrhea free. During night, 2 caps at bedtime & 2 caps every 4hrs until AM 60 capsule 3     magic mouthwash suspension (diphenhydrAMINE, lidocaine, aluminum-magnesium & simethicone) Swish and swallow 10 mLs in mouth every 6 hours as needed for mouth sores 250 mL 0     methocarbamol (ROBAXIN) 750 MG tablet TAKE 1 TABLET BY MOUTH EVERY 4 HOURS AS NEEDED (Patient not taking: Reported on 8/2/2021)       OLANZapine (ZYPREXA) 2.5 MG tablet Take 1 tablet (2.5 mg) by mouth At Bedtime 30 tablet 3     ondansetron (ZOFRAN-ODT) 8 MG ODT tab Take 1 tablet (8 mg) by mouth every 8 hours as needed for nausea 30 tablet 3     prochlorperazine (COMPAZINE) 10 MG tablet Take 1 tablet (10 mg) by mouth every 6 hours as needed (Nausea/Vomiting) 30 tablet 2     valACYclovir (VALTREX) 1000 mg tablet TAKE TWO PILLS THEN REPEAT ONE TIME IN 12 HOURS THEN STOP. REPEAT FOR FUTURE OUTBREAKS AS NEEDED.  (Patient not taking: Reported on 8/2/2021)       ZENPEP [id number] units CPEP TAKE 2-3 CAPS WITH MEALS AND 1-2 CAPS WITH SNACKS 360 capsule 1     Objective:  General: patient appears well in no acute distress, alert and oriented, speech clear and fluid  Skin: no visualized rash or lesions on visualized skin  Resp: Appears to be breathing comfortably without accessory muscle usage, speaking in full sentences, no audible wheezes or cough.  Psych: Coherent speech, normal rate and volume, able to articulate logical thoughts, able to abstract reason, no tangential thoughts, no hallucinations or delusions  Patient's affect is appropriate.    Laboratory Data:   10/4/2021 08:20   Sodium 142   Potassium 3.8   Chloride 109   Carbon Dioxide 28   Urea Nitrogen 11   Creatinine 0.75   GFR Estimate >90   Calcium 8.8   Anion Gap 5   Albumin 3.2 (L)   Protein Total 6.8   Bilirubin Total 0.4   Alkaline Phosphatase 85   ALT 28   AST 21   Cancer Antigen 19-9 240 (H)   Glucose 96   WBC 5.9   Hemoglobin 11.2 (L)   Hematocrit 33.8 (L)   Platelet Count 168   RBC Count 3.68 (L)   MCV 92   MCH 30.4   MCHC 33.1   RDW 15.7 (H)   % Neutrophils 55   % Lymphocytes 22   % Monocytes 20   % Eosinophils 1   Absolute Basophils 0.1   % Basophils 1   Absolute Eosinophils 0.1   Absolute Immature Granulocytes 0.0   Absolute Lymphocytes 1.3   Absolute Monocytes 1.2   % Immature Granulocytes 1   Absolute Neutrophils 3.3   Absolute NRBCs 0.0   NRBCs per 100 WBC 0     I reviewed the above labs today.    Assessment and Plan:  Metastatic pancreatic cancer. Patient is currently on treatment with Gemzar and Abraxane, which he is tolerating reasonably well. Due to issues with neutropenia and thrombocytopenia, will plan to give treatment on a day 1/15 every 28 day cycle with Neulasta support. He will continue with cycle 2 today. He will have repeat imaging after 2 cycles. He is also interested in clinical trials. Discussed the [email or web address] website. Also,  we may consider having him seen in our phase 1 clinical trial clinic.     Nausea. Secondary to chemotherapy. Managed with Zyprexa. Also, has Compazine and Zofran available. Declines adding in IV Emend.     Peripheral neuropathy. Stable. Discussed that he may try acupuncture and okay to take a vitamin B complex supplement.     Fatigue. Recommend resuming daily exercise. Declines cancer rehab referral.     Pancreatic insufficiency. Patient will continue on pancreatic enzymes. He has met with our dietician this week.     Mucositis. He will continue to use Magic Mouthwash. Recommend salt/soda swishes.     Merle Freeman PA-C  [facility]  [address] [place] [address]  [phone]    60 minutes spent on the date of the encounter doing chart review, review of test results, interpretation of tests, patient visit and documentation       Markus is a 61 year old who is being evaluated via a billable video visit.      How would you like to obtain your AVS? MyChart  If the video visit is dropped, the invitation should be resent by: Text to cell phone: [phone]  Will anyone else be joining your video visit? No      Video-Visit Details    Type of service:  Video Visit    Video Start Time: 7:07 AM    Video End Time:7:42 AM    Originating Location (pt. Location): Home    Distant Location (provider location):  [facility]     Platform used for Video Visit: Kaushal Freeman PA-C

## 2021-10-04 NOTE — Clinical Note
10/4/2021         RE: Markus Stallworth  [address]  [place] [facility] [address]        Dear Colleague,    Thank you for referring your patient, Markus Stallworth, to the [facility]. Please see a copy of my visit note below.    Oncology/Hematology Visit Note  Oct 4, 2021    Reason for Visit: follow up of metastatic pancreatic cancer    History of Present Illness: Markus Stallworth is a 61 year old male with metastatic pancreatic cancer. He initially presented with modest left upper quadrant pain that led to an ultrasound and then further imaging demonstrating the presence of a large mass in the tail of his pancreas with associated splenic vascular involvement with splenomegaly and liver metastases.  A biopsy of one of his liver lesions shows an adenocarcinoma consistent with pancreatic primary. His port was placed on 6/4/21. He started on treatment with 5FU, irinotecan, and oxaliplatin (FOLFIRINOX) on 6/7/21. Please see previous notes for further details on the patient's history. He comes in today for routine follow up prior to cycle 2.    C1D1   8/31: 3.8/1.9, 126  C1D8   9/8: 3.5/1.6, 66  Held     9/14: 1.9/0.8, 76  C1D15 9/20: 2.8/1.2, 181    Gemzar/Abraxane C2  Every other week, neulasta    Interval History:  -Around 2.5-3, has had chills and body aches with each round. Did not take any medication for the aches except took Theraflu once.   -Has had some nausea that started around day 3, associated with no appetite and a weird taste in his mouth.   -Neuropathy in feet in constant in toes and balls of feet. At times has felt off balance. Denies associated pain. Also, has neuropathy in fingertips, but less bothersome. Neuropathy is both numbness and tingling.   -Cold sensation is improving.    -Had mouth sores, using Magic Mouthwash prn.   -Eating and drinking fair. Has continued to lose weight. Has met with our dietician.   -Has not been exercising. Feels leg strength is low.   -Has not been sleeping  well intermittently. Has been taking Zyprexa.   -Has been continuing to work.   -Reports bowels are doing okay.   -Reports a good mood.      Emend  Tylenol?  Acupuncture- B complex vitamin   Nutrition  Exercise- cancer rehab declines  Sleep aide-Zyprexa  Mouth sores  Neupogen- self  Phase I clinical trial clinic  Clinicaltrials.gov willing to travel      Current Outpatient Medications   Medication Sig Dispense Refill     Acetaminophen (TYLENOL) 325 MG CAPS 325 tablets by Oral or Feeding Tube route as needed (Patient not taking: Reported on 8/16/2021)       benzonatate (TESSALON) 100 MG capsule Take 1-2 capsules (100-200 mg) by mouth 3 times daily as needed for cough (Patient not taking: Reported on 8/16/2021) 30 capsule 1     diphenhydrAMINE-APAP, sleep, (TYLENOL PM EXTRA STRENGTH PO)        diphenoxylate-atropine (LOMOTIL) 2.5-0.025 MG tablet Take 1-2 tablets by mouth 4 times daily as needed for diarrhea 60 tablet 0     lidocaine (XYLOCAINE) 2 % solution Swish and swallow 10 mLs in mouth every 6 hours as needed (mouth sores) ; Max 8 doses/24 hour period. 250 mL 1     loperamide (IMODIUM) 2 MG capsule 2 caps at 1st sign of diarrhea & 1 cap every 2hrs until 12hrs diarrhea free. During night, 2 caps at bedtime & 2 caps every 4hrs until AM 60 capsule 3     magic mouthwash suspension (diphenhydrAMINE, lidocaine, aluminum-magnesium & simethicone) Swish and swallow 10 mLs in mouth every 6 hours as needed for mouth sores 250 mL 0     methocarbamol (ROBAXIN) 750 MG tablet TAKE 1 TABLET BY MOUTH EVERY 4 HOURS AS NEEDED (Patient not taking: Reported on 8/2/2021)       OLANZapine (ZYPREXA) 2.5 MG tablet Take 1 tablet (2.5 mg) by mouth At Bedtime 30 tablet 3     ondansetron (ZOFRAN-ODT) 8 MG ODT tab Take 1 tablet (8 mg) by mouth every 8 hours as needed for nausea 30 tablet 3     prochlorperazine (COMPAZINE) 10 MG tablet Take 1 tablet (10 mg) by mouth every 6 hours as needed (Nausea/Vomiting) 30 tablet 2     valACYclovir  (VALTREX) 1000 mg tablet TAKE TWO PILLS THEN REPEAT ONE TIME IN 12 HOURS THEN STOP. REPEAT FOR FUTURE OUTBREAKS AS NEEDED. (Patient not taking: Reported on 8/2/2021)       ZENPEP 93151-595294 units CPEP TAKE 2-3 CAPS WITH MEALS AND 1-2 CAPS WITH SNACKS 360 capsule 1     Objective:  General: patient appears well in no acute distress, alert and oriented, speech clear and fluid  Skin: no visualized rash or lesions on visualized skin  Resp: Appears to be breathing comfortably without accessory muscle usage, speaking in full sentences, no audible wheezes or cough.  Psych: Coherent speech, normal rate and volume, able to articulate logical thoughts, able to abstract reason, no tangential thoughts, no hallucinations or delusions  Patient's affect is appropriate.    Laboratory Data:  Most Recent 3 CBC's:  Recent Labs   Lab Test 09/20/21  0748 09/14/21  0836 09/08/21  0855   WBC 2.8* 1.9* 3.5*   HGB 10.5* 10.4* 11.0*   MCV 91 90 89    76* 66*    Most Recent 3 BMP's:  Recent Labs   Lab Test 08/31/21  0851 08/16/21  0741 08/02/21  0756    139 142   POTASSIUM 3.7 3.9 3.4   CHLORIDE 109 105 110*   CO2 28 29 28   BUN 18 17 18   CR 0.82 0.84 0.78   ANIONGAP 5 5 4   CHRISTINA 9.1 8.8 8.1*   * 91 93    Most Recent 2 LFT's:  Recent Labs   Lab Test 08/31/21  0851 08/16/21  0741   AST 29 27   ALT 26 33   ALKPHOS 95 100   BILITOTAL 0.6 0.4   I reviewed the above labs today.    Assessment and Plan:  Metastatic pancreatic cancer. Patient tolerated the first cycle of FOLFIRINOX reasonably well with some difficulty with nausea and diarrhea mid-cycle. He will continue with cycle 2 today. I will check on the status of testing with NGS panel, Fusion, PDL-1, MSI, BRCA, and PAL-B2.  He will continue with chemotherapy every 2 weeks and will follow-up with Dr. Kemp in early August with a CT CAP.  He will call sooner for concerns.    Nausea. Secondary to chemotherapy. He does not wish to take Ativan or have it in his house, so will  dispose of this with his local 's office. He will try Zyprexa at bedtime on days 3-9. He may also take Zofran and Compazine as needed.     Diarrhea. Recommend taking Imodium for mild diarrhea and Lomotil for more severe diarrhea.     Fatigue. Recommend resuming daily exercise.     Pancreatic insufficiency. Patient will continue on pancreatic enzymes. He will be meeting with our dietician this week. Of note, his albumin and total protein returned mildly low. He should incorporate more protein into his diet. He will continue with small, frequent meals.     Merle Freeman PA-C  [facility]  [address] [place] [id number]  [phone]      Markus is a 61 year old who is being evaluated via a billable video visit.      How would you like to obtain your AVS? MyChart  If the video visit is dropped, the invitation should be resent by: Text to cell phone: [phone]  Will anyone else be joining your video visit? No      Video-Visit Details    Type of service:  Video Visit    Video Start Time: 7:07 AM    Video End Time:7:42 AM    Originating Location (pt. Location): Home    Distant Location (provider location):  [facility]ER [facility]     Platform used for Video Visit: Betterfly      Again, thank you for allowing me to participate in the care of your patient.        Sincerely,        Merle Freeman PA-C

## 2021-10-04 NOTE — PROGRESS NOTES
Infusion Nursing Note:  Markus Stallworth presents today for C2D1 Abraxane/Gemzar.    Patient seen by provider today: Yes: Merle Freeman PA-C.   present during visit today: Not Applicable.    Note: Patient states he has weakness in his legs due to muscle loss. Informed about cancer rehab.  Patient declined at this time.    Patient prefers to get infusion in [place] due to convenience/transportation.    Received call from Merle MARTÍNEZ. Removing day 8 from chemo cycle. Adding Neulasta Onpro.    Intravenous Access:  Implanted Port.    Treatment Conditions:  Lab Results   Component Value Date    HGB 11.2 (L) 10/04/2021    WBC 5.9 10/04/2021    ANEU 0.8 (L) 09/14/2021    ANEUTAUTO 3.3 10/04/2021     10/04/2021      Lab Results   Component Value Date     10/04/2021    POTASSIUM 3.8 10/04/2021    CR 0.75 10/04/2021    CHRISTINA 8.8 10/04/2021    BILITOTAL 0.4 10/04/2021    ALBUMIN 3.2 (L) 10/04/2021    ALT 28 10/04/2021    AST 21 10/04/2021     Results reviewed, labs MET treatment parameters, ok to proceed with treatment.      Post Infusion Assessment:  Patient tolerated infusion without incident.  Blood return noted pre and post infusion.  Site patent and intact, free from redness, edema or discomfort.  No evidence of extravasations.  Access discontinued per protocol.   ONPRO  Was placed on patient's: right side of abdomen.  Was placed at 1000 AM  Patient education included: what patient can expect after application, what colored lights mean on the device, when to remove device, when and where to call with questions or issues, all patients questions answered and that Neulasta administration will occur at 1300 tomorrow.  Patient tolerated administration well.        Discharge Plan:   [facility]  Patient will return 10/11/21 for next appointment.   Patient discharged in stable condition accompanied by: self.  Departure Mode: Ambulatory.      Selena Trujillo RN

## 2021-10-06 NOTE — PROGRESS NOTES
Oncology Distress Screening Follow-up  Clinical Social Work  [facility]    Identified Concern and Score From Distress Screenin. How concerned are you about your ability to eat?   3           2. How concerned are you about unintended weight loss or your current weight?   2           3. How concerned are you about feeling depressed or very sad?   0           4. How concerned are you about feeling anxious or very scared?   0           6. How concerned are you about work and home life issues that may be affected by your cancer?   2  work           7. How concerned are you about knowing what resources are available to help you?   7Abnormal            8. Do you currently have what you would describe as Confucianist or spiritual struggles?              Not at all                 Date of Distress Screening: 10-4/21      Data: Markus seen in clinic this week for follow up of metastatic pancreatic cancer      Intervention/Education Provided:: PADMAJA called and left message for Markus introducing self and reason for call. PADMAJA offered support and resources. Provided contact information and encouraged Markus to call back when he is able.       Follow-up Required: PADMAJA will await Markus's call back.         YASMANY Montenegro, [facility]  Clinical , Adult Oncology  Phone: [phone]

## 2021-10-18 NOTE — PROGRESS NOTES
Infusion Nursing Note:  Markus Stallworth presents today for C2D15 Gemzar.  OnPro  Patient seen by provider today: No   present during visit today: Not Applicable.    Note: Pt c/o peripheral neuropathy worsening in his feet, fingers remain stable with some neuropathy, appetite decreased but trying to eat at least 3 times/day, takes zofran with relief of nausea.  See flow sheet for assessment.      Intravenous Access:  Implanted Port.    Treatment Conditions:  Lab Results   Component Value Date    HGB 12.0 (L) 10/18/2021    WBC 9.7 10/18/2021    ANEU 0.8 (L) 09/14/2021    ANEUTAUTO 6.6 10/18/2021     10/18/2021      Lab Results   Component Value Date     10/04/2021    POTASSIUM 3.8 10/04/2021    CR 0.75 10/04/2021    CHRISTINA 8.8 10/04/2021    BILITOTAL 0.4 10/04/2021    ALBUMIN 3.2 (L) 10/04/2021    ALT 28 10/04/2021    AST 21 10/04/2021     Results reviewed, labs MET treatment parameters, ok to proceed with treatment.      Post Infusion Assessment:  Patient tolerated infusion without incident.  Blood return noted pre and post infusion.  Site patent and intact, free from redness, edema or discomfort.  No evidence of extravasations.  Access discontinued per protocol.  ONPRO  Was placed on patient's: right side of abdomen.    Was placed at 11:45 AM    ONPRO injector device Lot number: [id number]    Patient education included: what patient can expect after application, what colored lights mean on the device, when to remove device, when and where to call with questions or issues, all patients questions answered and that Neulasta administration will occur at 2:45pm on 10/20/21.    Patient tolerated administration well.         Discharge Plan:   Patient discharged in stable condition accompanied by: self.  Departure Mode: Ambulatory.  Pt will RTC 11/1/21 for C3D1 Abraxane/Gemzar/OnPro.      Julio Cesar Gil RN

## 2021-10-18 NOTE — PROGRESS NOTES
Port needle left for access for treatment. Sterile technique performed and maintained. Patient tolerated procedure well. Tubes drawn in rainbow order: purple (double signed). Transparent dressing placed with use of tegaderm.     Niesha Allen RN on 10/18/2021 at 9:57 AM

## 2021-10-25 NOTE — PROGRESS NOTES
"Patient's name and  were verified.  See Doc Flowsheet - IV assess for details.  IVAD accessed with 20G 3/4\" krueger gripper plus needle  blood return positive: YES  Site without redness, tenderness or swelling: YES  flushed with 10cc NS and 5cc 100u/ml heparin  Needle: left in place for CT scan    Comments: Labs drawn.  Patient tolerated procedure without incident.      "

## 2021-10-25 NOTE — TELEPHONE ENCOUNTER
[facility] Triage    Incoming Urgent finding from [facility]    Dr. Kemp    CT done today:     DATE:  10/25/21  TIME OF RECEIPT FROM LAB:  404 pm  LAB TEST:  CT   LAB VALUE:  Metastatic disease with gastro involvement, splenic artery occlusion  RESULTS PAGED TO  Dr. Kemp 408 pm  TIME LAB VALUE REPORTED TO PROVIDER: 408 per page requesting confirmation of receipt to [phone]    Routing to Dr. Kemp and Clive Will for follow up.    Dr. Atkinson returned page and will look closer and the results and follow up with patient.

## 2021-10-28 PROBLEM — N52.9 ERECTILE DYSFUNCTION: Status: ACTIVE | Noted: 2021-01-01

## 2021-10-28 PROBLEM — N40.0 BENIGN PROSTATIC HYPERPLASIA: Status: ACTIVE | Noted: 2021-01-01

## 2021-10-28 PROBLEM — Z82.49 FAMILY HISTORY OF PULMONARY EMBOLISM: Status: ACTIVE | Noted: 2021-01-01

## 2021-10-28 PROBLEM — I83.93 VARICOSE VEINS OF BOTH LOWER EXTREMITIES: Status: ACTIVE | Noted: 2021-01-01

## 2021-10-28 PROBLEM — K42.9 UMBILICAL HERNIA: Status: ACTIVE | Noted: 2021-01-01

## 2021-10-28 PROBLEM — C25.9 PANCREATIC CANCER METASTASIZED TO LIVER (H): Status: ACTIVE | Noted: 2021-01-01

## 2021-10-28 PROBLEM — C78.7 PANCREATIC CANCER METASTASIZED TO LIVER (H): Status: ACTIVE | Noted: 2021-01-01

## 2021-10-28 NOTE — Clinical Note
"    10/28/2021         RE: Markus Stallworth  [address]  [place] [facility] [address]        Dear Colleague,    Thank you for referring your patient, Markus Stallworth, to the [facility]. Please see a copy of my visit note below.    Markus is a 61 year old who is being evaluated via a billable video visit.      How would you like to obtain your AVS? MyChart  If the video visit is dropped, the invitation should be resent by: Text to cell phone: [phone]  Will anyone else be joining your video visit? No  {If patient encounters technical issues they should call [phone] :[id number]}    Video Start Time: {video visit start/end time for provider to select:152948}  Video-Visit Details    Type of service:  Video Visit    Video End Time:{video visit start/end time for provider to select:152948}    Originating Location (pt. Location): {video visit patient location:281476::\"Home\"}    Distant Location (provider location):  [facility]     Platform used for Video Visit: {Virtual Visit Platforms:568241::\"Well\"}      Again, thank you for allowing me to participate in the care of your patient.        Sincerely,        Luisito Kemp MD  "

## 2021-10-28 NOTE — LETTER
10/28/2021         RE: Markus Stallworth  [address]  [facility] [address]      Markus is a 61 year old who is being evaluated via a billable video visit.      How would you like to obtain your AVS? MyChart  If the video visit is dropped, the invitation should be resent by: Text to cell phone: [phone]  Will anyone else be joining your video visit? No      Video-Visit Details    Type of service:  Video Visit    Originating Location (pt. Location): Home    Distant Location (provider location):  [facility]     Platform used for Video Visit: SalesWarp     I am seeing Markus Stallworth today in follow-up of metastatic pancreatic cancer.    He is 61 years old and presented late this spring with a large tumor in the tail of his pancreas and associated liver metastases.  He received initial treatment with FOLFIRINOX with questionable progression after 2 months and clear progression after 3 months.  He has now had 2 months of gemcitabine and Abraxane and returns for a response assessment.  His overall condition has declined somewhat with poor appetite and weight loss.  His wife accompanies him today and is coping better with things.    He is quite slender and has complete alopecia but otherwise appears well.    I personally reviewed his CT scan went over the results with patient and his wife.  He has significant increase in the size of his primary tumor as well as his liver metastases.  Of some concern is an appearance of his tumor probably having eroded into the wall of the stomach with air now in the tumor.  His lab work is notable for his CA 19-9 having increased up to 569.  His other lab work shows normal electrolytes and renal function.  He has a mildly depressed albumin of 3.3 with a normal bilirubin and liver enzymes.  He has a mildly elevated white count and otherwise unremarkable blood counts.    Assessment/plan: Progressive metastatic pancreatic cancer.  The degree of progression is fairly  significant.  The patient still has a good performance status but it seems to be declining.  At a very long and susan discussion with the patient and his wife about his circumstance and how to proceed.  We discussed what to expect without active treatment.  We reviewed what research studies might be available for him.  We have previously put him on the wait list for our crisper study I am not optimistic that we can get him treated anytime soon and discussed with him the problems of the 3-month interval between harvesting lymphocytes and then actually being ready for therapy.  We spent a long time discussing the nature of clinical trials and went through many of the trials listed as available in the national database.  We will plan on having him visit with our phase 1 study team next week to see if he is eligible for any open slots on her current studies.  We discussed with him whether he wished to have a visit either done at [place] or other [facility] cancer center to discuss what phase 1 studies might be available to them there.  We will make further plans once we see the results of those additional consultations.    Total by me on the date of service time inclusive of the above discussion and coordination of care was approximately 75 minutes.        Luisito Kemp MD

## 2021-10-28 NOTE — PROGRESS NOTES
Markus is a 61 year old who is being evaluated via a billable video visit.      How would you like to obtain your AVS? [employer]  If the video visit is dropped, the invitation should be resent by: Text to cell phone: [phone]  Will anyone else be joining your video visit? No      Video-Visit Details    Type of service:  Video Visit    Originating Location (pt. Location): Home    Distant Location (provider location):  [facility]CER [facility]     Platform used for Video Visit: JW Player     I am seeing Markus Stallworth today in follow-up of metastatic pancreatic cancer.    He is 61 years old and presented late this spring with a large tumor in the tail of his pancreas and associated liver metastases.  He received initial treatment with FOLFIRINOX with questionable progression after 2 months and clear progression after 3 months.  He has now had 2 months of gemcitabine and Abraxane and returns for a response assessment.  His overall condition has declined somewhat with poor appetite and weight loss.  His wife accompanies him today and is coping better with things.    He is quite slender and has complete alopecia but otherwise appears well.    I personally reviewed his CT scan went over the results with patient and his wife.  He has significant increase in the size of his primary tumor as well as his liver metastases.  Of some concern is an appearance of his tumor probably having eroded into the wall of the stomach with air now in the tumor.  His lab work is notable for his CA 19-9 having increased up to 569.  His other lab work shows normal electrolytes and renal function.  He has a mildly depressed albumin of 3.3 with a normal bilirubin and liver enzymes.  He has a mildly elevated white count and otherwise unremarkable blood counts.    Assessment/plan: Progressive metastatic pancreatic cancer.  The degree of progression is fairly significant.  The patient still has a good performance status but it seems to be  declining.  At a very long and susan discussion with the patient and his wife about his circumstance and how to proceed.  We discussed what to expect without active treatment.  We reviewed what research studies might be available for him.  We have previously put him on the wait list for our crisper study I am not optimistic that we can get him treated anytime soon and discussed with him the problems of the 3-month interval between harvesting lymphocytes and then actually being ready for therapy.  We spent a long time discussing the nature of clinical trials and went through many of the trials listed as available in the national database.  We will plan on having him visit with our phase 1 study team next week to see if he is eligible for any open slots on her current studies.  We discussed with him whether he wished to have a visit either done at [place] or other large cancer center to discuss what phase 1 studies might be available to them there.  We will make further plans once we see the results of those additional consultations.    Total by me on the date of service time inclusive of the above discussion and coordination of care was approximately 75 minutes.

## 2021-10-29 NOTE — PROGRESS NOTES
RN Care Coordination Note  Received call to patient with questions following appointment with Dr Kemp. Patient has progressed through current treatment. Recommending phase 1 trial clinic. Patient questions if by stopping chemo he will lose eligibility to trials. Reviewed with Dr Kmep. Patient should not plan for treatment as scheduled on Monday, 11/1. Stopping chemo which he has progressed through does not harm with eligibility criteria.     Placed return call to patient. Reviewed above. We discussed function of phase 1 clinic. Referral placed.     Answered all questions to his stated satisfaction.      Mitali Will RN, BSN, OCN   RN Care Coordinator   [facility]

## 2021-11-01 NOTE — PROGRESS NOTES
RN Care Coordination Note  Received call from patient with frustrations in delay in scheduling consult with Phase 1 - DTC clinic. RNCC will reach out to new patient scheduling/navigators for any assistance able to provide. Patient would also like to have referral to [facility] for possible clinical trials.      Patient reports he has noticed his stools have slightly darkened. Patient will continue to monitor. Reviewed to watch for black, tarry stools as signs of GI bleeding. Asked if stools darken more to call clinic. Patient voiced understanding and had no further questions.           Mitali Will RN, BSN, OCN   RN Care Coordinator   [facility]

## 2021-11-03 NOTE — LETTER
11/3/2021         RE: aMrkus Stallworth  [address]  [facility] [address]        Dear Colleague,    Thank you for referring your patient, Markus Stallworth, to the [facility]. Please see a copy of my visit note below.    REASON FOR VISIT:    CANCER STAGE: Cancer Staging  Pancreatic cancer metastasized to liver (H)  Staging form: Pancreas, AJCC 8th Edition  - Clinical: Stage IV (cT2, cN0, pM1) - Unsigned        HISTORY OF PRESENT ILLNESS:    Markus Stallworth is a 61 year old male with metastatic pancreatic cancer. He initially presented with modest left upper quadrant pain that led to an ultrasound and then further imaging demonstrating the presence of a large mass in the tail of his pancreas with associated splenic vascular involvement with splenomegaly and liver metastases.  A biopsy of one of his liver lesions shows an adenocarcinoma consistent with pancreatic primary. Targeted gene fusions of ALK, ROS1, RET, NTRK1, and NTRK3, as well as MET e14 skipping mutations were not detected in the analyzed sample. His port was placed on 6/4/21. He started on treatment with 5FU, irinotecan, and oxaliplatin (FOLFIRINOX) on 6/7/21. He received 6 cycles, last on 8/16/21. CT CAP on 8/23/21 showed disease progression. He then started on treatment with Gemzar and Abraxane on 9/8/21. Due to neutropenia and thrombocytopenia, his treatment was changed from a day 1/8/15 every 28 day schedule to a day 1/15 every 28 day schedule with Neulasta.  He had repeat CT scan staging on 10/25/21 that showed progression of liver metastasis, pancreas mass as well as a new liver lesion.  Given that he has exhausted standard therapy, he was referrred here for consideration of phase I trials.     He is overall doing ok. He has noticed more fatigue and weakness that has been progressive over the past few months.  This manifests mostly as being tired a lot. 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 Having said that, he is still up till this week working full time at [employer].  He gets tired more easily however - for example walking around a lake, he got tired at the end of it.  His performance status remains ECOG of 1.  He has decreased appetite and has lost about 30 lbs gradually over the past year.      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  He has loss of sensation in his hands and feet.  This does result in some difficulty in writing and buttoning his shirt.  He otherwise has no dyspnea, no bowel or bladder complaints.      Review Of Systems  10-point review of systems were negative except as noted in HPI.        EXAM:  Blood pressure 110/74, pulse 94, temperature 97.5  F (36.4  C), resp. rate 16, height 1.829 m (6'), weight 83 kg (183 lb), SpO2 96 %.  GEN: alert and oriented x 3, nad, he appears well.    I did not otherwise examine him today.     Current Outpatient Medications   Medication Sig Dispense Refill     ibuprofen (ADVIL) 200 MG tablet 1 tablet with food or milk as needed       loratadine (CLARITIN) 10 MG tablet Take 10 mg by mouth daily       OLANZapine (ZYPREXA) 2.5 MG tablet Take 1 tablet (2.5 mg) by mouth At Bedtime 30 tablet 3     ondansetron (ZOFRAN-ODT) 8 MG ODT tab Take 1 tablet (8 mg) by mouth every 8 hours as needed for nausea 30 tablet 3     ZENPEP [id number] units CPEP TAKE 2-3 CAPS WITH MEALS AND 1-2 CAPS WITH SNACKS 360 capsule 1     benzonatate (TESSALON) 100 MG capsule Take 1-2 capsules (100-200 mg) by mouth 3 times daily as needed for cough (Patient not taking: Reported on 8/16/2021) 30 capsule 1     diphenoxylate-atropine (LOMOTIL) 2.5-0.025 MG tablet Take 1-2 tablets by mouth 4 times daily as needed for diarrhea (Patient not taking: Reported on 10/4/2021) 60 tablet 0     lidocaine (XYLOCAINE) 2 % solution Swish and swallow 10 mLs in mouth every 6 hours as needed (mouth sores) ; Max  8 doses/24 hour period. (Patient not taking: Reported on 10/4/2021) 250 mL 1     loperamide (IMODIUM) 2 MG capsule 2 caps at 1st sign of diarrhea & 1 cap every 2hrs until 12hrs diarrhea free. During night, 2 caps at bedtime & 2 caps every 4hrs until AM (Patient not taking: Reported on 10/4/2021) 60 capsule 3     methocarbamol (ROBAXIN) 750 MG tablet TAKE 1 TABLET BY MOUTH EVERY 4 HOURS AS NEEDED (Patient not taking: Reported on 8/2/2021)       prochlorperazine (COMPAZINE) 10 MG tablet Take 1 tablet (10 mg) by mouth every 6 hours as needed (Nausea/Vomiting) (Patient not taking: Reported on 10/4/2021) 30 tablet 2     valACYclovir (VALTREX) 1000 mg tablet TAKE TWO PILLS THEN REPEAT ONE TIME IN 12 HOURS THEN STOP. REPEAT FOR FUTURE OUTBREAKS AS NEEDED. (Patient not taking: Reported on 8/2/2021)             Recent Labs   Lab Test 10/25/21  0735   WBC 14.3*   HGB 11.4*   *     @labrcent[na,potassium,chloride,co2,bun,cr@  Recent Labs   Lab Test 10/25/21  0735   PROTTOTAL 6.8   ALBUMIN 3.3*   BILITOTAL 0.4   AST 27   ALT 31   ALKPHOS 122         Recent Results (from the past 744 hour(s))   CT Chest/Abdomen/Pelvis w Contrast   Result Value    Radiologist flags Metastatic disease progression with gastric (Urgent)    Narrative    EXAMINATION: CT CHEST/ABDOMEN/PELVIS W CONTRAST, 10/25/2021 9:45 AM    TECHNIQUE: Helical CT images from the thoracic inlet through the  symphysis pubis were obtained with intravenous contrast utilizing  pancreas mass protocol. Contrast dose: isovue 370 115mL    COMPARISON: CT 8/23/2021, 7/22/2021, 5/24/2021    HISTORY: Pancreatic adenocarcinoma, assess treatment response;  Malignant neoplasm of tail of pancreas (H)    FINDINGS:    Chest: Right chest wall Port-A-Cath with tip terminating in the low  SVC. Thyroid gland is unremarkable. No supraclavicular or axillary  lymphadenopathy. No mediastinal lymphadenopathy. Cardiac size is  normal. Small amount of fluid in the aortic recess. No  pericardial  effusion. Aortic and pulmonary artery caliber within normal limits.  Visualized portions of the aortic arch branching vessels are  unremarkable. Esophagus is unremarkable.    Trachea and bronchi are patent and clear of debris. Bibasilar  dependent atelectasis/fibrotic scarring. No evidence of airspace  disease. No new or suspicious pulmonary nodules. No pneumothorax. No  pleural effusion.    Abdomen and pelvis: Large centrally hypoattenuating mass centered  about the pancreatic tail measuring approximately 9.1 x 7.4 x 6.7 cm  (series 5 image 68), previously 7.4 x 6.9 x 6.3 cm when measured in  similar axis. Compared to 8/23/2021, there is new extension/erosion  through the inferior aspect of the lesser curvature of the stomach  (series 5 image 52) with air-containing material within the anterior  aspect of the pancreatic mass (series 5 image 55). Persistent abutment  against the splenic hilum. The remainder of the pancreatic head, neck  and proximal body are unremarkable. Main pancreatic duct within the  these portions is unremarkable.    Numerous heterogeneously hypoattenuating metastatic lesions throughout  the liver, the largest measuring 5.7 x 5.2 cm in hepatic segments 3  (series 5 image 40), previously 5.2 x 4.7 cm. Multiple additional  increasing similar appearing metastatic lesions, including a lobulated  3.4 x 3.2 cm lesion in the right hepatic dome (series 5 image 23),  previously 1.1 cm, and a new 2.4 cm lesion along the gallbladder fossa  (series 5 image 45). Multiple additional hypoattenuating hepatic  lesions, cysts versus indeterminate lesions. No intrahepatic biliary  dilatation.    Increased splenomegaly, measuring 15.1 cm in length. In the inferior  spleen, there is a peripherally hyperintense, centrally hypodense  lesion measuring 2.2 cm (series 7 image 25), previously 1.7 cm, and a  1.7 cm hypoattenuating lesion in the anterosuperior spleen (series 7  image 1:30), not clearly  visualized on prior exam.    Gallbladder is partially dilated and unremarkable. The right adrenal  gland is unremarkable. The left adrenal gland is unremarkable although  the left limb closely approximates the large pancreatic tail mass  (series 7 image 167). Simple right renal cyst. Ureters and urinary  bladder are unremarkable. Stomach and small intestine are  unremarkable. Scattered colonic diverticula. The colon closely  approximates the pancreatic tail mass at the splenic flexure without  definite evidence of local invasion    Scattered prominent retroperitoneal lymph nodes, not significantly  changed prior. No free fluid. No pneumoperitoneum.    Abdominal aorta is normal in caliber and patent. Celiac and mesenteric  artery origins are unremarkable. Occlusion of the splenic artery near  its origin (series 5 image 69). Numerous splenorenal collaterals.  Portal veins and IVC are patent.    Bones and soft tissues: Multilevel Schmorl's node deformity. Endplate  degenerative changes at L5-S1. No suspicious osseous abnormalities.  Bilateral fat-containing inguinal hernias.      Impression    IMPRESSION: In this patient with history of metastatic pancreatic  cancer, currently on chemotherapy:  1. Overall findings consistent with disease progression, including:  a. Increased size of the primary pancreatic mass centered about the  pancreatic tail with new erosion into the lesser curvature of the  stomach. Small amount of air-containing material now within the  anterior superior aspect of the pancreatic mass.  b. Multiple increasing and new hepatic metastases, the largest  measuring up to 5.7 cm in hepatic segment 3. New 2.4 cm lesion in in  hepatic segment 5.  c. Two hypoattenuating splenic lesions, parenchymal infarcts versus  metastatic disease.  d. Persist abutment of the pancreatic mass against the left adrenal  gland and splenic hilum.  2. New splenic artery occlusion.  3. Splenomegaly with splenorenal  collaterals.    [Access Center: Metastatic disease progression with gastric  involvement, splenic artery occlusion]  Metastatic disease progression, splenic artery occlusionr to ensure a  provider acknowledges the finding. Access Center is available Monday  through Friday 8am-3:30 pm.     I have personally reviewed the examination and initial interpretation  and I agree with the findings.    ANAHI WYNN MD         SYSTEM ID:  [id number]           Assessment/Plan  ECOG PS1  Metastatic pancreatic cancer - I think he could be a candidate for 2 of our trials.  We spent some time discussing both of these trials.  We did discuss the nature of early phase trials in that we really don't have a lot of prior data in humans to know how well they might work.  However, the trials that we have, we have either selected because they have an interesting or novel mechanism of action and have shown some promise in preclinical cancer models that make it interesting to see how they work in people.      Oncolytic vaccinia virus expressing Il-7 and Il-12 ([id number]) - We discussed the rationale for the study.  Using a live oncolytic virus to get an initial anticancer eeffect followed by secretion of the cytokines which can activate a systemic antitumor response.  we are not technically open to accrual, but we have heard that they have 4 slots open and we should be open imminently.  He has liver lesions that should be amenable to intratumoral injection.  The pancreas lesion would not be amenable to intratumoral injection due to its size, but it could be a site that we biopsy and monitor.  We discussed the potential toxicity which is largely unknown, but I would expect fevers, chills, nausea, headache and perhaps eye irritation are likely side effects related to virus infection. It also could cause other immune related adverse events.  There would also be some riks of repeated intratumoral injection such as bleeding or injection site pain.    He is likely otherwise eligible, however, his albumin was 3.3 last time. The protocol requires albumin >3.4.  It likely could fluctuate on repeat testing, but we did let him know about this.    We can likely start consent, screen and start treatment within the next few weeks.     LILRb2 antibody -   This is felt to be an alternative immune checkpoint antibody.  This is also a [facility] study, so very little is known about potential activity or toxicity.  It is given by IV.  This is also not yet open here, and I do not know about available slots.  However, the SIV is this week and assuming there were available treatment slots, we could do this study.  He would be eligible through our prescreening.  We discussed the rationale for this study and potential toxicity which is largely unknown, but mostly would be expected to be immune-related.      I encouraged him to also inquire at [facility] - and they apparently have a minnelide study open there which is something we have some familiarity with here.  There were some antitumor responses on the initial trial done here several years ago.      He has a list of trials that are open nationwide, however, he said [place] is the only place he would really be interested in looking beyond [place].  There is an ADC [id number] +/- nivolumab that accepts pancreas cancer which appears that it could be interesting.      He is interested in the [id number] trial.  He is going to review the ICFs and perhaps will call [place]. I gave him my contact information and card.  We will be in touch with him soon to see if he wishes to move forward and if so, we will bring him back for consent and screening visit in the next week.     I spent 80 minutes with the patient.  >50% of the time was spent in counseling and coordination of care.    Ra Quintanilla   of Medicine  Division of Hematology, Oncology, and Transplantation

## 2021-11-03 NOTE — PROGRESS NOTES
Triple combination therapy FOLFRI - first 2 weeks did horrible on this. Started taking zofran and an enzyme medication. Started taking those and felt better on therapy. Started Gemzar and paclitaxiel - had chills and body aches.   Suffers from extreme fatigue - he was quite fit for his whole life. He explained that he goes on 1 mile and half walks and has to put his feet up. He feels his legs lost all his muscle. Working up until Thursday 4-5 hours a week but told his boss he wants to step down to focus on his family. Works at [employer].   Runner racer and candle stick maker  Says he's losing weight.  Numbness and tingling in his fingers - dropping stuff and can't button his shirts     Discussed  ASP study and injecting his liver lesion since it's easier to access. Discussed albumin 3.4 requirement.  Discussed Jounce study

## 2021-11-03 NOTE — PROGRESS NOTES
REASON FOR VISIT:    CANCER STAGE: Cancer Staging  Pancreatic cancer metastasized to liver (H)  Staging form: Pancreas, AJCC 8th Edition  - Clinical: Stage IV (cT2, cN0, pM1) - Unsigned        HISTORY OF PRESENT ILLNESS:    Markus Stallworth is a 61 year old male with metastatic pancreatic cancer. He initially presented with modest left upper quadrant pain that led to an ultrasound and then further imaging demonstrating the presence of a large mass in the tail of his pancreas with associated splenic vascular involvement with splenomegaly and liver metastases.  A biopsy of one of his liver lesions shows an adenocarcinoma consistent with pancreatic primary. Targeted gene fusions of ALK, ROS1, RET, NTRK1, and NTRK3, as well as MET e14 skipping mutations were not detected in the analyzed sample. His port was placed on 6/4/21. He started on treatment with 5FU, irinotecan, and oxaliplatin (FOLFIRINOX) on 6/7/21. He received 6 cycles, last on 8/16/21. CT CAP on 8/23/21 showed disease progression. He then started on treatment with Gemzar and Abraxane on 9/8/21. Due to neutropenia and thrombocytopenia, his treatment was changed from a day 1/8/15 every 28 day schedule to a day 1/15 every 28 day schedule with Neulasta.  He had repeat CT scan staging on 10/25/21 that showed progression of liver metastasis, pancreas mass as well as a new liver lesion.  Given that he has exhausted standard therapy, he was referrred here for consideration of phase I trials.     He is overall doing ok. He has noticed more fatigue and weakness that has been progressive over the past few months.  This manifests mostly as being tired a lot. 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 Having said that, he is still up till this week working full time at [employer].  He gets tired more easily however - for example walking around a lake, he got tired at the end of it.  His performance  status remains ECOG of 1.  He has decreased appetite and has lost about 30 lbs gradually over the past year.      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  He has loss of sensation in his hands and feet.  This does result in some difficulty in writing and buttoning his shirt.  He otherwise has no dyspnea, no bowel or bladder complaints.      Review Of Systems  10-point review of systems were negative except as noted in HPI.        EXAM:  Blood pressure 110/74, pulse 94, temperature 97.5  F (36.4  C), resp. rate 16, height 1.829 m (6'), weight 83 kg (183 lb), SpO2 96 %.  GEN: alert and oriented x 3, nad, he appears well.    I did not otherwise examine him today.     Current Outpatient Medications   Medication Sig Dispense Refill     ibuprofen (ADVIL) 200 MG tablet 1 tablet with food or milk as needed       loratadine (CLARITIN) 10 MG tablet Take 10 mg by mouth daily       OLANZapine (ZYPREXA) 2.5 MG tablet Take 1 tablet (2.5 mg) by mouth At Bedtime 30 tablet 3     ondansetron (ZOFRAN-ODT) 8 MG ODT tab Take 1 tablet (8 mg) by mouth every 8 hours as needed for nausea 30 tablet 3     ZENPEP 40929-193328 units CPEP TAKE 2-3 CAPS WITH MEALS AND 1-2 CAPS WITH SNACKS 360 capsule 1     benzonatate (TESSALON) 100 MG capsule Take 1-2 capsules (100-200 mg) by mouth 3 times daily as needed for cough (Patient not taking: Reported on 8/16/2021) 30 capsule 1     diphenoxylate-atropine (LOMOTIL) 2.5-0.025 MG tablet Take 1-2 tablets by mouth 4 times daily as needed for diarrhea (Patient not taking: Reported on 10/4/2021) 60 tablet 0     lidocaine (XYLOCAINE) 2 % solution Swish and swallow 10 mLs in mouth every 6 hours as needed (mouth sores) ; Max 8 doses/24 hour period. (Patient not taking: Reported on 10/4/2021) 250 mL 1     loperamide (IMODIUM) 2 MG capsule 2 caps at 1st sign of diarrhea & 1 cap every 2hrs until 12hrs diarrhea free. During night, 2 caps at bedtime & 2 caps every 4hrs  until AM (Patient not taking: Reported on 10/4/2021) 60 capsule 3     methocarbamol (ROBAXIN) 750 MG tablet TAKE 1 TABLET BY MOUTH EVERY 4 HOURS AS NEEDED (Patient not taking: Reported on 8/2/2021)       prochlorperazine (COMPAZINE) 10 MG tablet Take 1 tablet (10 mg) by mouth every 6 hours as needed (Nausea/Vomiting) (Patient not taking: Reported on 10/4/2021) 30 tablet 2     valACYclovir (VALTREX) 1000 mg tablet TAKE TWO PILLS THEN REPEAT ONE TIME IN 12 HOURS THEN STOP. REPEAT FOR FUTURE OUTBREAKS AS NEEDED. (Patient not taking: Reported on 8/2/2021)             Recent Labs   Lab Test 10/25/21  0735   WBC 14.3*   HGB 11.4*   *     @labrcent[na,potassium,chloride,co2,bun,cr@  Recent Labs   Lab Test 10/25/21  0735   PROTTOTAL 6.8   ALBUMIN 3.3*   BILITOTAL 0.4   AST 27   ALT 31   ALKPHOS 122         Recent Results (from the past 744 hour(s))   CT Chest/Abdomen/Pelvis w Contrast   Result Value    Radiologist flags Metastatic disease progression with gastric (Urgent)    Narrative    EXAMINATION: CT CHEST/ABDOMEN/PELVIS W CONTRAST, 10/25/2021 9:45 AM    TECHNIQUE: Helical CT images from the thoracic inlet through the  symphysis pubis were obtained with intravenous contrast utilizing  pancreas mass protocol. Contrast dose: isovue 370 115mL    COMPARISON: CT 8/23/2021, 7/22/2021, 5/24/2021    HISTORY: Pancreatic adenocarcinoma, assess treatment response;  Malignant neoplasm of tail of pancreas (H)    FINDINGS:    Chest: Right chest wall Port-A-Cath with tip terminating in the low  SVC. Thyroid gland is unremarkable. No supraclavicular or axillary  lymphadenopathy. No mediastinal lymphadenopathy. Cardiac size is  normal. Small amount of fluid in the aortic recess. No pericardial  effusion. Aortic and pulmonary artery caliber within normal limits.  Visualized portions of the aortic arch branching vessels are  unremarkable. Esophagus is unremarkable.    Trachea and bronchi are patent and clear of debris.  Bibasilar  dependent atelectasis/fibrotic scarring. No evidence of airspace  disease. No new or suspicious pulmonary nodules. No pneumothorax. No  pleural effusion.    Abdomen and pelvis: Large centrally hypoattenuating mass centered  about the pancreatic tail measuring approximately 9.1 x 7.4 x 6.7 cm  (series 5 image 68), previously 7.4 x 6.9 x 6.3 cm when measured in  similar axis. Compared to 8/23/2021, there is new extension/erosion  through the inferior aspect of the lesser curvature of the stomach  (series 5 image 52) with air-containing material within the anterior  aspect of the pancreatic mass (series 5 image 55). Persistent abutment  against the splenic hilum. The remainder of the pancreatic head, neck  and proximal body are unremarkable. Main pancreatic duct within the  these portions is unremarkable.    Numerous heterogeneously hypoattenuating metastatic lesions throughout  the liver, the largest measuring 5.7 x 5.2 cm in hepatic segments 3  (series 5 image 40), previously 5.2 x 4.7 cm. Multiple additional  increasing similar appearing metastatic lesions, including a lobulated  3.4 x 3.2 cm lesion in the right hepatic dome (series 5 image 23),  previously 1.1 cm, and a new 2.4 cm lesion along the gallbladder fossa  (series 5 image 45). Multiple additional hypoattenuating hepatic  lesions, cysts versus indeterminate lesions. No intrahepatic biliary  dilatation.    Increased splenomegaly, measuring 15.1 cm in length. In the inferior  spleen, there is a peripherally hyperintense, centrally hypodense  lesion measuring 2.2 cm (series 7 image 25), previously 1.7 cm, and a  1.7 cm hypoattenuating lesion in the anterosuperior spleen (series 7  image 1:30), not clearly visualized on prior exam.    Gallbladder is partially dilated and unremarkable. The right adrenal  gland is unremarkable. The left adrenal gland is unremarkable although  the left limb closely approximates the large pancreatic tail mass  (series 7  image 167). Simple right renal cyst. Ureters and urinary  bladder are unremarkable. Stomach and small intestine are  unremarkable. Scattered colonic diverticula. The colon closely  approximates the pancreatic tail mass at the splenic flexure without  definite evidence of local invasion    Scattered prominent retroperitoneal lymph nodes, not significantly  changed prior. No free fluid. No pneumoperitoneum.    Abdominal aorta is normal in caliber and patent. Celiac and mesenteric  artery origins are unremarkable. Occlusion of the splenic artery near  its origin (series 5 image 69). Numerous splenorenal collaterals.  Portal veins and IVC are patent.    Bones and soft tissues: Multilevel Schmorl's node deformity. Endplate  degenerative changes at L5-S1. No suspicious osseous abnormalities.  Bilateral fat-containing inguinal hernias.      Impression    IMPRESSION: In this patient with history of metastatic pancreatic  cancer, currently on chemotherapy:  1. Overall findings consistent with disease progression, including:  a. Increased size of the primary pancreatic mass centered about the  pancreatic tail with new erosion into the lesser curvature of the  stomach. Small amount of air-containing material now within the  anterior superior aspect of the pancreatic mass.  b. Multiple increasing and new hepatic metastases, the largest  measuring up to 5.7 cm in hepatic segment 3. New 2.4 cm lesion in in  hepatic segment 5.  c. Two hypoattenuating splenic lesions, parenchymal infarcts versus  metastatic disease.  d. Persist abutment of the pancreatic mass against the left adrenal  gland and splenic hilum.  2. New splenic artery occlusion.  3. Splenomegaly with splenorenal collaterals.    [Access Center: Metastatic disease progression with gastric  involvement, splenic artery occlusion]  Metastatic disease progression, splenic artery occlusionr to ensure a  provider acknowledges the finding. Access Center is available  Monday  through Friday 8am-3:30 pm.     I have personally reviewed the examination and initial interpretation  and I agree with the findings.    ANAHI YWNN MD         SYSTEM ID:  [id number]           Assessment/Plan  ECOG PS1  Metastatic pancreatic cancer - I think he could be a candidate for 2 of our trials.  We spent some time discussing both of these trials.  We did discuss the nature of early phase trials in that we really don't have a lot of prior data in humans to know how well they might work.  However, the trials that we have, we have either selected because they have an interesting or novel mechanism of action and have shown some promise in preclinical cancer models that make it interesting to see how they work in people.      Oncolytic vaccinia virus expressing Il-7 and Il-12 ([id number]) - We discussed the rationale for the study.  Using a live oncolytic virus to get an initial anticancer eeffect followed by secretion of the cytokines which can activate a systemic antitumor response.  we are not technically open to accrual, but we have heard that they have 4 slots open and we should be open imminently.  He has liver lesions that should be amenable to intratumoral injection.  The pancreas lesion would not be amenable to intratumoral injection due to its size, but it could be a site that we biopsy and monitor.  We discussed the potential toxicity which is largely unknown, but I would expect fevers, chills, nausea, headache and perhaps eye irritation are likely side effects related to virus infection. It also could cause other immune related adverse events.  There would also be some riks of repeated intratumoral injection such as bleeding or injection site pain.   He is likely otherwise eligible, however, his albumin was 3.3 last time. The protocol requires albumin >3.4.  It likely could fluctuate on repeat testing, but we did let him know about this.    We can likely start consent, screen and start treatment  within the next few weeks.     LILRb2 antibody -   This is felt to be an alternative immune checkpoint antibody.  This is also a Replaced by [facility] study, so very little is known about potential activity or toxicity.  It is given by IV.  This is also not yet open here, and I do not know about available slots.  However, the SIV is this week and assuming there were available treatment slots, we could do this study.  He would be eligible through our prescreening.  We discussed the rationale for this study and potential toxicity which is largely unknown, but mostly would be expected to be immune-related.      I encouraged him to also inquire at [facility] - and they apparently have a minnelide study open there which is something we have some familiarity with here.  There were some antitumor responses on the initial trial done here several years ago.      He has a list of trials that are open nationwide, however, he said [place] is the only place he would really be interested in looking beyond [place].  There is an ADC [id number] +/- nivolumab that accepts pancreas cancer which appears that it could be interesting.      He is interested in the [id number] trial.  He is going to review the ICFs and perhaps will call [facility]. I gave him my contact information and card.  We will be in touch with him soon to see if he wishes to move forward and if so, we will bring him back for consent and screening visit in the next week.     I spent 80 minutes with the patient.  >50% of the time was spent in counseling and coordination of care.    Ra Quintanilla   of Medicine  Division of Hematology, Oncology, and Transplantation

## 2021-11-03 NOTE — NURSING NOTE
Oncology Rooming Note    November 3, 2021 8:26 AM   Markus Stallworth is a 61 year old male who presents for:    Chief Complaint   Patient presents with     Oncology Clinic Visit     Pancreatic cancer metastasized to liver      Initial Vitals: /74   Pulse 94   Temp 97.5  F (36.4  C)   Resp 16   Ht 1.829 m (6')   Wt 83 kg (183 lb)   SpO2 96%   BMI 24.82 kg/m   Estimated body mass index is 24.82 kg/m  as calculated from the following:    Height as of this encounter: 1.829 m (6').    Weight as of this encounter: 83 kg (183 lb). Body surface area is 2.05 meters squared.  No Pain (0) Comment: Data Unavailable   No LMP for male patient.  Allergies reviewed: Yes  Medications reviewed: Yes    Medications: Medication refills not needed today.  Pharmacy name entered into [employer]: [id number] [address]    Clinical concerns: No new concerns       Trev Weller MA

## 2021-11-15 NOTE — PROGRESS NOTES
This is a recent snapshot of the patient's [facility] medical record.  For current drug dose and complete information and questions, call [phone] or In Basket pool, [facility] ([id number])  CSN Number:  [id number]

## 2021-11-29 PROBLEM — K92.0 HEMATEMESIS WITH NAUSEA: Status: ACTIVE | Noted: 2021-01-01

## 2021-11-29 PROBLEM — K92.1 MELENA: Status: ACTIVE | Noted: 2021-01-01

## 2021-11-29 PROBLEM — K92.2 ACUTE GI BLEEDING: Status: ACTIVE | Noted: 2021-01-01

## 2021-11-29 PROBLEM — C25.9: Status: ACTIVE | Noted: 2021-01-01

## 2021-11-29 PROBLEM — D64.9 SYMPTOMATIC ANEMIA: Status: ACTIVE | Noted: 2021-01-01

## 2021-11-29 NOTE — ED TRIAGE NOTES
Pt presents to ER with his son with complaints of increased weakness, fatigue, and he's multiple episodes of blood in his stool. Pt also mentions he had multiple episodes of blood in his vomit on Fri and Sat. Pt is a stage 4 pancreatic CA patient and was told his tumor was invading his stomach on the last scan. Pt is concerned for GI bleed. Pt is hypotensive in triage.

## 2021-11-29 NOTE — PROGRESS NOTES
"RN Care Coordination Note  Received return call from patient. States he did receive voicemail. He understands recommendations for ED. States he had large clots of blood in vomit on Friday (11/27) and Saturday (11/28). None yesterday and today. Continued to encourage patient to go to ED. He will discuss with his family and decide on going to [facility] or [facility]. Discussed there is possibility his needs may be too complex for [facility].     We also discussed if he would like to stay home and not having bleeding addressed, hospice referral could be placed. Patient states he is not yet ready for hospice and would like to \"continue to fight.\" Answered all questions to his stated satisfaction.          Mitali Will RN, BSN, OCN   RN Care Coordinator   [facility]           "

## 2021-11-29 NOTE — ED PROVIDER NOTES
[place] EMERGENCY DEPARTMENT ([facility])  11/29/21 ED19  History     Chief Complaint   Patient presents with     Abdominal Pain     Melena     Hematemesis     The history is provided by the patient and medical records.     Markus Stallworth is a 61 year old male with a past medical history of pancreatic cancer metastasized to the liver (last treatment 10/2021) who presents to the emergency department for evaluation of abdominal pain, melena, hematemesis. Patient reports he has been having black tarry stools intermittently for three weeks, and hematemesis that began three days ago.  He reports that first his vomiting did not contain any blood clots but then on 11/27 he began to vomit blood clots profusely.  He reportedly contacted his oncology team and they advised him to present to the ED if his symptoms did not resolve. He reports associated chills, lightheadedness, and no appetite.  Patient has never had this melena or hematemesis before.  Patient states that he was having mid epigastric pain so he decided to take ibuprofen, 2 pills 2-3 times per day.  Denies drugs or alcohol use.    Patient reports on his last CT scan there is evidence of his metastases infiltrating his stomach wall.  Impression from his CT scan is shown below.    CT CHEST/ABDOMEN/PELVIS W CONTRAST, 10/25/2021 9:45 AM  IMPRESSION:   1. Overall findings consistent with disease progression, including:  a. Increased size of the primary pancreatic mass centered about the  pancreatic tail with new erosion into the lesser curvature of the  stomach. Small amount of air-containing material now within the  anterior superior aspect of the pancreatic mass.  b. Multiple increasing and new hepatic metastases, the largest  measuring up to 5.7 cm in hepatic segment 3. New 2.4 cm lesion in in  hepatic segment 5.  c. Two hypoattenuating splenic lesions, parenchymal infarcts versus  metastatic disease.  d. Persist abutment of the pancreatic mass against  the left adrenal  gland and splenic hilum.  2. New splenic artery occlusion.  3. Splenomegaly with splenorenal collaterals.    Past Medical History  No past medical history on file.  Past Surgical History:   Procedure Laterality Date     IR CHEST PORT PLACEMENT > 5 YRS OF AGE  6/4/2021     benzonatate (TESSALON) 100 MG capsule  diphenoxylate-atropine (LOMOTIL) 2.5-0.025 MG tablet  ibuprofen (ADVIL) 200 MG tablet  lidocaine (XYLOCAINE) 2 % solution  loperamide (IMODIUM) 2 MG capsule  loratadine (CLARITIN) 10 MG tablet  methocarbamol (ROBAXIN) 750 MG tablet  OLANZapine (ZYPREXA) 2.5 MG tablet  ondansetron (ZOFRAN-ODT) 8 MG ODT tab  prochlorperazine (COMPAZINE) 10 MG tablet  valACYclovir (VALTREX) 1000 mg tablet  ZENPEP [id number] units CPEP      No Known Allergies  Family History  No family history on file.  Social History   Social History     Tobacco Use     Smoking status: Never Smoker     Smokeless tobacco: Never Used   Substance Use Topics     Alcohol use: Not on file     Drug use: Not on file      Past medical history, past surgical history, medications, allergies, family history, and social history were reviewed with the patient. No additional pertinent items.       Review of Systems   Constitutional: Positive for chills. Negative for fever.   Gastrointestinal: Positive for abdominal pain, blood in stool, nausea and vomiting (hematemesis).   Neurological: Positive for light-headedness.   All other systems reviewed and are negative.    A complete review of systems was performed with pertinent positives and negatives noted in the HPI, and all other systems negative.    Physical Exam   BP: (!) 88/51  Pulse: 98  Temp: 98.4  F (36.9  C)  Resp: 18  SpO2: 98 %  Physical Exam  Constitutional:       General: He is not in acute distress.     Appearance: He is not diaphoretic.   HENT:      Head: Atraumatic.   Eyes:      General: No scleral icterus.     Pupils: Pupils are equal, round, and reactive to light.       Comments: Bilateral conjunctival pallor   Cardiovascular:      Heart sounds: Normal heart sounds.   Pulmonary:      Effort: No respiratory distress.      Breath sounds: Normal breath sounds.   Abdominal:      General: Bowel sounds are normal.      Palpations: Abdomen is soft.      Tenderness: There is no abdominal tenderness.   Musculoskeletal:         General: No tenderness.   Skin:     General: Skin is warm.      Coloration: Skin is pale.      Findings: No rash.       ED Course     5:36 PM  The patient was seen and examined by Nelsy EPPS in Room 19.    Procedures            EKG Interpretation:      Interpreted by Nelsy Hatch MD  Time reviewed: 1828  Symptoms at time of EKG: Lightheadedness, GI Bleed  Rhythm: normal sinus   Rate: Normal  Axis: Normal  Ectopy: none  Conduction: normal  ST Segments/ T Waves: Non-specific ST-T wave changes  Q Waves: none  Comparison to prior: No old EKG available    Clinical Impression: non-specific EKG              A consult was attained from the [facility] neurology service. The case was discussed with the GI Luminal Fellow from that service. The consulting service's recommendations were provided.     Critical Care Addendum    My initial assessment, based on my review of prehospital provider report, review of nursing observations, review of vital signs, focused history, physical exam, review of cardiac rhythm monitor, 12 lead ECG analysis, discussion with Gastroenterology and and Administration of blood , established that Markus Stallworth has severe hemorrhage, which requires immediate intervention, and therefore he is critically ill.     After the initial assessment, the care team initiated multiple lab tests, initiated IV fluid administration and initiated medication therapy with Blood products, IV Protonix to provide stabilization care. Due to the critical nature of this patient, I reassessed nursing observations, vital signs, physical exam, review of cardiac rhythm monitor,  12 lead ECG analysis, mental status, neurologic status and respiratory status multiple times prior to his disposition.     Time also spent performing documentation, discussion with family to obtain medical information for decision making, reviewing test results, discussion with consultants and coordination of care.     Critical care time (excluding teaching time and procedures): 45 minutes.   The medical record was reviewed and interpreted.  Current labs reviewed and interpreted.  Current images reviewed and interpreted: CTA with masses likely source of GI hemorrhage.  EKG reviewed and interpreted: No evidence of acute ischemic changes.       Results for orders placed or performed during the hospital encounter of 11/29/21   CTA Abdomen Pelvis with Contrast     Status: None    Narrative    EXAMINATION: CTA ABDOMEN PELVIS WITH CONTRAST, 11/29/2021 8:10 PM    TECHNIQUE:  Helical CT images from the lung bases through the  symphysis pubis were obtained with IV contrast in the late arterial  phase and with 80 second delay. Contrast dose: iopamidol (ISOVUE-370)  solution 103 mL    COMPARISON: Chest abdomen pelvis CT 10/25/2021, 8/23/2021, 7/22/2021    HISTORY: GI bleed    FINDINGS:    Abdomen/Pelvis    Liver: Multiple heterogeneously hypoattenuating hepatic lesions  demonstrate increased size since 10/25/2021. For example, the largest  lesion in hepatic segments II/III measures 7.2 x 6.9 cm , series 7  image 84, (previously 5.7 x 5.2. Lobulated lesion in the right hepatic  dome, series 7 image 57 measures 5.6 x 5.5 cm (previously 3.4 x 3.2  cm. A few additional fluid attenuation hepatic cysts are unchanged,  for example segment V/VI, series 7 image 143.    Biliary: No radiodense gallstones. No intrahepatic or extrahepatic  biliary dilation.     Pancreas: Increased size of a large centrally hypoattenuating mass  about the pancreatic tail measuring up to 9.1 cm in greatest  dimension. The mass is eroding into the stomach  with foci of air seen  within the mass. There is a thin hyperdensity at the anterior margin  of the mass, on arterial phase series 7 image 121, which does not  blossom on delayed images. Faint hyperdensity is seen in this region  on noncontrast images.    Spleen: Mild splenomegaly, up to 13.5 cm in transverse dimension.  Stable to slightly rim-enhancing foci in the inferior spleen.    Adrenals: Abutment of the left adrenal gland by the above-described  pancreatic mass.    Kidneys: No hydronephrosis. No suspicious kidney lesion.  Unchanged  simple cyst in the right kidney.    Vascular: The celiac artery, SMA, CAMMY, and both renal arteries are  patent. Accessory right renal artery is patent. 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    Nonaneurysmal abdominal aorta.  Portal veins and hepatic veins are  patent.      Bowel: Mass eroding into the stomach, described above. No dilated  bowel.  Left colonic diverticulosis without evidence for  diverticulitis. Nondilated appendix.    Peritoneum/Retroperitoneum: No significant free fluid or free air.      Lymph nodes: No enlarged lymph nodes by short axis criteria.      Pelvic organs: Unremarkable.     Lower thorax: Mild subsegmental linear atelectasis in the lung bases.  Trace pericardial effusion.    Bones/Soft Tissues: Multilevel degenerative changes of the visualized  spine.  Degenerative disc disease at L5-S1. Degenerative changes of  the hip joints and sacroiliac joints. No acute or aggressive appearing  bone lesion.         Impression    IMPRESSION:   1. Enlarging mass about the pancreatic tail eroding into the stomach  again demonstrated, representing the likely source of hemorrhage. No  active arterial bleeding demonstrated. Likely small focus of layering  blood products at the anterior margin of the mass.  2. Redemonstration of multiple hepatic  metastases, increased in size  in comparison with 10/25/2021.  3. Unchanged splenomegaly and occluded splenic artery and vein.  4. Stable to slightly increased rim enhancing foci in the inferior  spleen.    I have personally reviewed the examination and initial interpretation  and I agree with the findings.    EUGENE IRIZARRY,          SYSTEM ID:  [id number]   Comprehensive metabolic panel     Status: Abnormal   Result Value Ref Range    Sodium 136 133 - 144 mmol/L    Potassium 3.8 3.4 - 5.3 mmol/L    Chloride 101 94 - 109 mmol/L    Carbon Dioxide (CO2) 29 20 - 32 mmol/L    Anion Gap 6 3 - 14 mmol/L    Urea Nitrogen 30 7 - 30 mg/dL    Creatinine 0.85 0.66 - 1.25 mg/dL    Calcium 11.0 (H) 8.5 - 10.1 mg/dL    Glucose 108 (H) 70 - 99 mg/dL    Alkaline Phosphatase 132 40 - 150 U/L    AST 24 0 - 45 U/L    ALT 18 0 - 70 U/L    Protein Total 5.9 (L) 6.8 - 8.8 g/dL    Albumin 2.2 (L) 3.4 - 5.0 g/dL    Bilirubin Total 0.5 0.2 - 1.3 mg/dL    GFR Estimate >90 >60 mL/min/1.73m2   INR     Status: Abnormal   Result Value Ref Range    INR 1.23 (H) 0.85 - 1.15   Partial thromboplastin time     Status: Normal   Result Value Ref Range    aPTT 24 22 - 38 Seconds   CBC with platelets and differential     Status: Abnormal   Result Value Ref Range    WBC Count 15.9 (H) 4.0 - 11.0 10e3/uL    RBC Count 2.29 (L) 4.40 - 5.90 10e6/uL    Hemoglobin 6.0 (LL) 13.3 - 17.7 g/dL    Hematocrit 20.2 (L) 40.0 - 53.0 %    MCV 88 78 - 100 fL    MCH 26.2 (L) 26.5 - 33.0 pg    MCHC 29.7 (L) 31.5 - 36.5 g/dL    RDW 15.7 (H) 10.0 - 15.0 %    Platelet Count 263 150 - 450 10e3/uL    % Neutrophils 76 %    % Lymphocytes 13 %    % Monocytes 9 %    % Eosinophils 1 %    % Basophils 0 %    % Immature Granulocytes 1 %    NRBCs per 100 WBC 0 <1 /100    Absolute Neutrophils 12.0 (H) 1.6 - 8.3 10e3/uL    Absolute Lymphocytes 2.1 0.8 - 5.3 10e3/uL    Absolute Monocytes 1.5 (H) 0.0 - 1.3 10e3/uL    Absolute Eosinophils 0.1 0.0 - 0.7 10e3/uL    Absolute Basophils 0.0 0.0 -  0.2 10e3/uL    Absolute Immature Granulocytes 0.1 (H) <=0.0 10e3/uL    Absolute NRBCs 0.0 10e3/uL   Asymptomatic COVID-19 Virus (Coronavirus) by PCR Nasopharyngeal     Status: Normal    Specimen: Nasopharyngeal; Swab   Result Value Ref Range    SARS CoV2 PCR Negative Negative, Testing sent to reference lab. Results will be returned via unsolicited result    Narrative    Testing was performed using the Xpert Xpress SARS-CoV-2 Assay on the  Cepheid Gene-Xpert Instrument Systems. Additional information about  this Emergency Use Authorization (EUA) assay can be found via the Lab  Guide. This test should be ordered for the detection of SARS-CoV-2 in  individuals who meet SARS-CoV-2 clinical and/or epidemiological  criteria. Test performance is unknown in asymptomatic patients. This  test is for in vitro diagnostic use under the FDA EUA for  laboratories certified under CLIA to perform high complexity testing.  This test has not been FDA cleared or approved. A negative result  does not rule out the presence of PCR inhibitors in the specimen or  target RNA in concentration below the limit of detection for the  assay. The possibility of a false negative should be considered if  the patient's recent exposure or clinical presentation suggests  COVID-19. This test was validated by the [facility]. This laboratory is certified under  the Clinical Laboratory Improvement Amendments of 1988 (CLIA-88) as  qualified to perform high complexity laboratory testing.     EKG 12 lead     Status: None (Preliminary result)   Result Value Ref Range    Systolic Blood Pressure  mmHg    Diastolic Blood Pressure  mmHg    Ventricular Rate 83 BPM    Atrial Rate 83 BPM    OR Interval 152 ms    QRS Duration 92 ms     ms    QTc 425 ms    P Axis 36 degrees    R AXIS 57 degrees    T Axis 35 degrees    Interpretation ECG       Sinus rhythm with Premature supraventricular complexes  Nonspecific ST  and T wave abnormality  Abnormal ECG     Adult Type and Screen     Status: None   Result Value Ref Range    ABO/RH(D) B POS     Antibody Screen Negative Negative    SPECIMEN EXPIRATION DATE [id number]    Prepare red blood cells (unit)     Status: None (Preliminary result)   Result Value Ref Range    CROSSMATCH Compatible     UNIT ABO/RH O Pos     Unit Number [id number]     Unit Status Issued     Blood Component Type Red Blood Cells     Product Code [id number]     CODING SYSTEM [id number]     UNIT TYPE ISBT 5100     ISSUE DATE AND TIME [id number]    Prepare red blood cells (unit)     Status: None (Preliminary result)   Result Value Ref Range    CROSSMATCH Compatible     UNIT ABO/RH O Pos     Unit Number [id number]     Unit Status Issued     Blood Component Type Red Blood Cells     Product Code S0115X26     CODING SYSTEM [id number]     UNIT TYPE ISBT 5100     ISSUE DATE AND TIME [id number]    CBC with platelets differential     Status: Abnormal    Narrative    The following orders were created for panel order CBC with platelets differential.  Procedure                               Abnormality         Status                     ---------                               -----------         ------                     CBC with platelets and d...[[id number]]  Abnormal            Final result                 Please view results for these tests on the individual orders.   ABO/Rh type and screen     Status: None    Narrative    The following orders were created for panel order ABO/Rh type and screen.  Procedure                               Abnormality         Status                     ---------                               -----------         ------                     Adult Type and Screen[[id number]]                            Final result                 Please view results for these tests on the individual orders.   [place] Draw     Status: None ()    Narrative    The following orders were created for panel  order [place] Draw.  Procedure                               Abnormality         Status                     ---------                               -----------         ------                     Extra Blood Culture Bottle[[id number]]                                                    Please view results for these tests on the individual orders.     Medications   pantoprazole (PROTONIX) 80 mg in sodium chloride 0.9 % 100 mL infusion (8 mg/hr Intravenous Rate/Dose Verify 11/29/21 2125)   octreotide (sandoSTATIN) 1,250 mcg in D5W 250 mL (50 mcg/hr Intravenous New Bag 11/29/21 2233)   pantoprazole (PROTONIX) IV push injection 40 mg (40 mg Intravenous Given 11/29/21 1821)   iopamidol (ISOVUE-370) solution 103 mL (103 mLs Intravenous Given 11/29/21 1957)   sodium chloride (PF) 0.9% PF flush 84 mL (84 mLs Intravenous Given 11/29/21 1957)        Assessments & Plan (with Medical Decision Making)   MDM:    61 year old M with past medical history of metastatic pancreatic cancer amongst others who presents with acute GI bleeding.  No history of previous varices, alcohol abuse, so started on IV Protonix but no octreotide or Rocephin given.  Vitals with initially soft blood pressures that improved spontaneously, no significant tachycardia, no episodes of melena or hematemesis while in the emergency department.  ECG unremarkable, labs remarkable for hemoglobin of 6.0, consent was obtained and he was given 2 units of irradiated packed red blood cells.  CTA ordered and GI consulted, they agree with the plan of care and will follow as an inpatient.  Plan to admit to medicine.    On reevaluation he is clinically improved.  I discussed all test results and the plan of care with the patient and his son who is bedside, they are agreeable for admission and possible GI intervention.    I have reviewed the nursing notes. I have reviewed the findings, diagnosis, plan and need for follow up with the patient.    New Prescriptions    No  medications on file       Final diagnoses:   Symptomatic anemia   Acute GI bleeding   Melena   Hematemesis with nausea   Primary malignant neoplasm of pancreas with metastasis to other site (H)     I, Latricia Luo, am serving as a trained medical scribe to document services personally performed by Nelsy Hatch MD based on the provider's statements to me on November 29, 2021.  This document has been checked and approved by the attending provider.    I, Nelsy Hatch MD, was physically present and have reviewed and verified the accuracy of this note documented by Latricia Luo, medical scribe.      Nelsy Hatch MD  --  Nelsy Hatch MD  [facility] EMERGENCY DEPARTMENT  11/29/2021

## 2021-11-29 NOTE — TELEPHONE ENCOUNTER
I called the patient to discuss his current circumstance. He has been having intermittent melena for the last 2-3 weeks, and in the past few days has vomited coffee grounds and sometimes red blood and clots. The last blood he saw was Saturday night. He feels extremely weak, but is not having chest pain or dyspnea at rest. My biggest concern is erosion of his tumor into the splenic vasculature and the risk of a catastrophic bleed. He has been reluctant to come in and be seen, but after our conversation he agrees to come to the ED this afternoon.  He currently is off therapy, having progressed on first and second line therapy for his metastatic pancreas cancer. There are no standard therapies available, but he is hoping to get into a clinical trial. I recommended focusing on his immediate issues with the bleeding, and we can have further discussions about what to do next once this has been addressed.

## 2021-11-29 NOTE — PROGRESS NOTES
RN Care Coordination Note  Placed call to patient to review mychart message. Patient reports the following:     Over the last 3 weeks I have had             bloody stool,            increased pain in my gut,             increased pain in my fingers and feet from the neuropathy,             intermittent headache,            some neurological hiccups (mostly when I m reading),            total exhaustion,            poor appetite,            more recently, I have vomited blood twice with a considerable volume of clots.    Unable to reach patient. Left detailed message encouraging patient to be seen in ED. Reviewed concern for bleeding needs intervention.     Mitali Will RN, BSN, OCN   RN Care Coordinator   [facility]

## 2021-11-29 NOTE — TELEPHONE ENCOUNTER
Markus just got off phone with Dr. Kemp and is going to [facility]

## 2021-11-30 NOTE — PROVIDER NOTIFICATION
#[phone] (Gold 7)  Last night pt. spiked temp during blood transfusion at ER.   Also today temp increased from 98 to 99.7, OVSS, should I stop blood if hit 100? Tylenol given already.   Thank you  RN [id number]

## 2021-11-30 NOTE — CONSULTS
GASTROENTEROLOGY CONSULTATION      Date of Admission:  11/29/2021          ASSESSMENT AND RECOMMENDATIONS:   Assessment:  Markus Stallworth is a 61 year old male admitted on 11/29/2021. He has a pmhx of metastatic pancreatic cancer (diagnosed 5/2021) with known hepatic metastases and splenic vasculature involvement who presents symptomatic anemia, dark stools and dark emesis for which GI is consulted.    #. Acute blood loss anemia  #. Melena, hematemesis  #. Metastatic pancreatic adenocarcinoma  Melena, hematemesis, several weeks of significant NSAID use concerning for possible UGIB secondary to PUD vs bleeding gastric invasion of pancreatic cancer given CT with erosion of pancreatic tail into lesser curvature. Also, given both venous and arterial occlusion at the splenic hilum, gastric varices and/or pseudoaneurysm are possible  Mild azotemia also supportive of UGIB. CTA negative for extravasation, though suggests blood products in stomach. VSS following 1 unit(s) PRBC, second unit stopped d/t c/f transfusion reaction.     Recommendations  -Continue NPO, tentative plan for EGD this pm following transfusion of additional 1 unit(s) PRBC (pending any further reaction)  -Continue PPI and octreotide ggt  -Continue to transfuse hgb < 7 from GI standpoint      Thank you for involving us in this patient's care. Please do not hesitate to contact the GI service with any questions or concerns.     Pt care plan discussed with Dr. Paige, GI staff physician.    YUMIKO Vasquez CNP  Text page  -------------------------------------------------------------------------------------------------------------------          Chief Complaint:   We were asked by Dr Paul of medicine to evaluate this patient with blood loss anemia    History is obtained from the patient and the medical record.          History of Present Illness:   Markus Stallworth is a 61 year old male admitted on 11/29/2021. He has a pmhx of metastatic pancreatic cancer  (diagnosed 5/2021) with known hepatic metastases and splenic vasculature involvement who presents symptomatic anemia, dark stools and dark emesis for which GI is consulted.    He reports intermittently seeing loose black stools for three weeks, most recent yesterday. Also having frequent constipation. Several days of bloody emesis starting 11/26 with coffee ground emesis, next two days with red blood and clots following significant retching. Endorses fatigue, generalized sharp/cramping upper abdominal pain worse over the past few weeks. Intermittent nausea, keeps liquids down, occasionaly vomits with solids.     Taking NSAIDs, at least 6 tabs of ibuprofen daily for several weeks up until the past few days.     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     Denies chest pain, dyspnea, prior GI bleeding. Colonoscopy 2019 with polyps. No prior EGD.            Past Medical History:   Reviewed and edited as appropriate  No past medical history on file.         Past Surgical History:   Reviewed and edited as appropriate   Past Surgical History:   Procedure Laterality Date     IR CHEST PORT PLACEMENT > 5 YRS OF AGE  6/4/2021            Previous Endoscopy:   No results found for this or any previous visit.         Social History:   Reviewed and edited as appropriate  Social History     Socioeconomic History     Marital status:      Spouse name: Not on file     Number of children: Not on file     Years of education: Not on file     Highest education level: Not on file   Occupational History     Not on file   Tobacco Use     Smoking status: Never Smoker     Smokeless tobacco: Never Used   Substance and Sexual Activity     Alcohol use: Not on file     Drug use: Not on file     Sexual activity: Not on file   Other Topics Concern     Not on file   Social History Narrative     Not on file     Social Determinants of Health     Financial Resource Strain: Not on file   Food Insecurity: Not on file   Transportation  Needs: Not on file   Physical Activity: Not on file   Stress: Not on file   Social Connections: Not on file   Intimate Partner Violence: Not on file   Housing Stability: Not on file            Family History:   Reviewed and edited as appropriate  No family history on file.    The patients family history was reviewed today and is non-contributory.            Allergies:   Reviewed and edited as appropriate   No Known Allergies         Medications:     Current Facility-Administered Medications   Medication     acetaminophen (TYLENOL) tablet 650 mg     octreotide (sandoSTATIN) 1,250 mcg in D5W 250 mL     ondansetron (ZOFRAN) injection 4 mg     pantoprazole (PROTONIX) 80 mg in sodium chloride 0.9 % 100 mL infusion             Review of Systems:     A complete review of systems was performed and is negative except as noted in the HPI           Physical Exam:   BP 94/59 (BP Location: Left arm)   Pulse 70   Temp 97  F (36.1  C) (Oral)   Resp 18   Wt 74.6 kg (164 lb 8 oz)   SpO2 100%   BMI 22.31 kg/m    Wt:   Wt Readings from Last 2 Encounters:   11/30/21 74.6 kg (164 lb 8 oz)   11/03/21 83 kg (183 lb)      Constitutional: cooperative, pleasant, not dyspneic/diaphoretic, no acute distress  Eyes: Sclera anicteric/injected  Ears/nose/mouth/throat: Normal oropharynx without ulcers or exudate, mucus membranes moist, hearing intact  Neck: supple without obvious mass  CV: No edema  Respiratory: Unlabored breathing  Lymph: No submandibular, supraclavicular or inguinal lymphadenopathy  Abd:  Nondistended, +bs, no hepatosplenomegaly, LUQ tenderness, no peritoneal signs  Skin: warm, perfused, no jaundice  Neuro: AAO x 3  Psych: Normal affect  MSK: No gross deformities  Rectal: No fissure/hemorrhoid. ELINOR with scant formed dark brown stool         Data:   Labs and imaging below were independently reviewed and interpreted    BMP  Recent Labs   Lab 11/29/21  1756      POTASSIUM 3.8   CHLORIDE 101   CHRISTINA 11.0*   CO2 29   BUN 30    CR 0.85   *     CBC  Recent Labs   Lab 21  0610 21  0549 21  1756   WBC 15.6* 15.4* 15.9*   RBC 2.48* 2.42* 2.29*   HGB 6.9* 6.7* 6.0*   HCT 22.4* 22.2* 20.2*   MCV 90 92 88   MCH 27.8 27.7 26.2*   MCHC 30.8* 30.2* 29.7*   RDW 15.6* 15.6* 15.7*    234 263     INR  Recent Labs   Lab 21  0549 21  1756   INR 1.26* 1.23*     LFTs  Recent Labs   Lab 21  1756   ALKPHOS 132   AST 24   ALT 18   BILITOTAL 0.5   PROTTOTAL 5.9*   ALBUMIN 2.2*      PANCNo lab results found in last 7 days.    Imagin2021 CTA ab/pelvis                                                                     IMPRESSION:   1. Enlarging mass about the pancreatic tail eroding into the stomach  again demonstrated, representing the likely source of hemorrhage. No  active arterial bleeding demonstrated. Likely small focus of layering  blood products at the anterior margin of the mass.  2. Redemonstration of multiple hepatic metastases, increased in size  in comparison with 10/25/2021.  3. Unchanged splenomegaly and occluded splenic artery and vein.  4. Stable to slightly increased rim enhancing foci in the inferior  spleen.     I have personally reviewed the examination and initial interpretation  and I agree with the findings.     EUGENE IRIZARRY, DO

## 2021-11-30 NOTE — PLAN OF CARE
[id number]  Admitted to 7D early morning.   RBC infused for Hgb 6.9, after transfusion, Temp max was 100.5, no other reaction symptomes presented, blood's bag sent to blood bank for examination. Provider notified.       NEURO: Alert and oriented x4.      RESPIRATORY: Room Air, denies dizziness, and SOB. Lungs sound, clear, equal bilateral.     CARDIO: VSS, denies dizziness, and extremity pain.      GI/:  Denies N/V, BS active, No BM tiday, AUOP.      SKIN: Intact.      ACTIVITY: Stand by assist.      PAIN: mild ABD pain, 1x tylenol given this morning.     DLA: Port, and PIV.     BG: No      PLAN:     Continue monitoring.

## 2021-11-30 NOTE — PROCEDURES
Brief EGD note:    Impression:            - EGD was done for evaluation of GI bleed ( melena and                          hematemesis). There was a a 12cm fungating and                          ulcerated mass eroding the lesser curvature of the                          stomach. The mass was slowly oozing and this is likely                          the source of melena. Hemostatic spray was succesfully                          applied for hemostasis. The spray will only provide                          temporary hemostasis. This is not a long term                          solution. The mass is not amenable to endoscopic                          treatment.                          - Isolated gastric varices without bleeding was seen                          in the fundus and the proximal portion of the gastric                          body. This is likely due to known obliteration of the                          splenic vein.                          -The esophagus was normal                          - The duodenum was normal.                          - No specimens collected.     Recommendation:            - [facility].   - If ongoing bleeding, patient may benefit from Interventional radiology with angiogram or radiation therapy.   - Please avoid NSAIDs   - Continue IV pantoprazole 40 mg BID   - Tumor bleeding is not amenable to endoscopic intervention    Saman Paige MD

## 2021-11-30 NOTE — PROGRESS NOTES
Cross Cover:     Per ED RN sign out, pt twice with low grade fevers when PRBC transfusion active.    PRBC stopped    CBC pending    Blood bank notified possible blood transfusion reaction

## 2021-11-30 NOTE — H&P
[facility]    History and Physical - Gold Nights       Date of Admission:  11/29/2021    Assessment & Plan      Markus Stallworth is a 61 year old male admitted on 11/29/2021. He has a pmhx of metastatic pancreatic cancer (diagnosed 5/2021) who presents with acute blood clot anemia likely 2/2 upper GI bleed     #anemia - Hgb 6.0   #melena  #hematemesis  #concern for upper GI bleed   Reported use of ~1600 mg of ibuprofen daily  X 1 month for cancer related pain management vs CTA ordered and prelim read indicates that pancreatic mass eroding into the stomach may be etiology of bleed. Has not had recurrent episodes of hematemesis or melena since arrival to ED. He is currently HDS. GI consulted in the ED will manage as below.   - CBC recheck in the AM, Hgb q6h for now.   - GI consulted  - NPO   - telemetry  - x2 large bore IVs  - protonix gtt, continued from the ED   - start octreotide gtt> discussed w/ GI fellow, CTA findings w/ occluded splenic artery which could precipitate varices     #hx of metastatic pancreatic adenocarcinoma w/ hepatic involvement  ECOG status 1  Follows with Dr. Ra Quintanilla, last seen in clinic 11/3/21. Has been looking into research trials for treatment. Did broach subject of palliative care with patient for pain management and he was interested in potentially following up outpatient.  - oncology consulted  - palliative care outpatient referral on discharge    #leukocytosis  Will continue to monitor, no signs of infection from history.; likely stress leukocytosis.   - CBC, CMP, INR    #nausea, vomiting  Patient not complaining of active NV. Has olanzapine prescribed for this  - hold PTA olanzapine for now   - PRN zofran       Diet: NPO for Medical/Clinical Reasons Except for: Meds    DVT Prophylaxis: VTE Prophylaxis contraindicated due to concern for GI bleed, SCD boots ordered.   Rizzo Catheter: Not present  Central Lines: None  Code Status:  Pt states  "he has an advance directive, would like to be FULL code for now per discussion.    Clinically Significant Risk Factors Present on Admission          # Hypercalcemia: Ca = 11.0 mg/dL (Ref range: 8.5 - 10.1 mg/dL) and/or iCa = N/A on admission, will monitor as appropriate    # Coagulation Defect: INR = 1.23 (Ref range: 0.85 - 1.15) and/or PTT = 24 Seconds (Ref range: 22 - 38 Seconds) on admission, will monitor for bleeding       Disposition Plan   Expected discharge: 1-2 days recommended to prior living arrangement once hemoglobin stable.       Shahla Lynne MD  [facility]  Securely message with the [employer] Web Console (learn more here)  Text page via [facility] Paging/Directory        ______________________________________________________________________    Chief Complaint   Melena, hematemesis    History is obtained from the patient  Son is also at bedside.     History of Present Illness   Marksu Stallworth is a 61 year old male w/pmhx of metastatic pancreatic cancer (diagnosed   5/2021) who presents with x2 episodes of hematemesis and dark stools.     Per patient, for the past 3 weeks he has had \"tarry stools\" on and off. States that he remembers a black tarry stool once 3 weeks ago and then it returned back to normal and then would have intermittent black stools. He states then last Friday (11/26) he had an episode of emesis that was black and then had another episode on Saturday (11/27) here it was dark liquid with blood clots. Denies any bright red blood. States that he did have a normal stool yesterday (11/28). He has not had any recurrent episodes of hematemesis or melena since arrival to the ED. He came to the ED after talking to the nursing line with the above symptoms. He does state that he has been taking approximately 400 mg ibuprofen 3-4 times daily for the past month to control his abdominal pain 2/2 his pancreatic cancer. States otherwise he has been getting weaker and " "losing weight for the past few weeks.     He otherwise denies any fever, cough, diarrhea, no urinary symptoms or rashes. States that he has noticed that sometimes when he is reading that he will have a hard time focusing where he \"blanks\" out. No other focal weaknesses or syncope episodes. No chest pain.     ED work up: BMP largely unremarkable Calcium 11~corrected for hypoalbuminemia 12. WBC 15.9, Hgb 6.0. VSS. INR 1.23. pRBC x 2. EKG w/NSR CTA ordered and pending. PPI gtt started.     Review of Systems    The 10 point Review of Systems is negative other than noted in the HPI or here.     Past Medical History    I have reviewed this patient's medical history and updated it with pertinent information if needed.   No past medical history on file.   States he was generally healthy with no chronic medical problems prior to this diagnosis of pancreatic CA     Past Surgical History   I have reviewed this patient's surgical history and updated it with pertinent information if needed.  Past Surgical History:   Procedure Laterality Date     IR CHEST PORT PLACEMENT > 5 YRS OF AGE  6/4/2021       Social History   I have reviewed this patient's social history and updated it with pertinent information if needed.  Social History     Tobacco Use     Smoking status: Never Smoker     Smokeless tobacco: Never Used   Substance Use Topics     Alcohol use: Not on file     Drug use: Not on file   Lives at home with wife.   Is a triathelete. Works at [employer] per chart review.   Did try some edibles for sleep/nausea, no other recreational drugs. Denies ETOH use. Previous cigar smoker, tho very minimal and far in the past per patient.     Family History     Mother - breast CA  Brother - prostate CA, DMT2  Father - 2TDM     Prior to Admission Medications   Prior to Admission Medications   Prescriptions Last Dose Informant Patient Reported? Taking?   OLANZapine (ZYPREXA) 2.5 MG tablet   No No   Sig: Take 1 tablet (2.5 mg) by mouth " At Bedtime   ZENPEP 53631-290015 units CPEP   No No   Sig: TAKE 2-3 CAPS WITH MEALS AND 1-2 CAPS WITH SNACKS   benzonatate (TESSALON) 100 MG capsule   No No   Sig: Take 1-2 capsules (100-200 mg) by mouth 3 times daily as needed for cough   Patient not taking: Reported on 2021   diphenoxylate-atropine (LOMOTIL) 2.5-0.025 MG tablet   No No   Sig: Take 1-2 tablets by mouth 4 times daily as needed for diarrhea   Patient not taking: Reported on 10/4/2021   ibuprofen (ADVIL) 200 MG tablet   Yes No   Si tablet with food or milk as needed   lidocaine (XYLOCAINE) 2 % solution   No No   Sig: Swish and swallow 10 mLs in mouth every 6 hours as needed (mouth sores) ; Max 8 doses/24 hour period.   Patient not taking: Reported on 10/4/2021   loperamide (IMODIUM) 2 MG capsule   No No   Si caps at 1st sign of diarrhea & 1 cap every 2hrs until 12hrs diarrhea free. During night, 2 caps at bedtime & 2 caps every 4hrs until AM   Patient not taking: Reported on 10/4/2021   loratadine (CLARITIN) 10 MG tablet   Yes No   Sig: Take 10 mg by mouth daily   methocarbamol (ROBAXIN) 750 MG tablet   Yes No   Sig: TAKE 1 TABLET BY MOUTH EVERY 4 HOURS AS NEEDED   Patient not taking: Reported on 2021   ondansetron (ZOFRAN-ODT) 8 MG ODT tab   No No   Sig: Take 1 tablet (8 mg) by mouth every 8 hours as needed for nausea   prochlorperazine (COMPAZINE) 10 MG tablet   No No   Sig: Take 1 tablet (10 mg) by mouth every 6 hours as needed (Nausea/Vomiting)   Patient not taking: Reported on 10/4/2021   valACYclovir (VALTREX) 1000 mg tablet   Yes No   Sig: TAKE TWO PILLS THEN REPEAT ONE TIME IN 12 HOURS THEN STOP. REPEAT FOR FUTURE OUTBREAKS AS NEEDED.   Patient not taking: Reported on 2021      Facility-Administered Medications: None     Allergies   No Known Allergies    Physical Exam   Vital Signs: Temp: 98.7  F (37.1  C) Temp src: Oral BP: 98/55 Pulse: 82   Resp: 18 SpO2: 100 % O2 Device: None (Room air)    Weight: 0 lbs 0 oz    Gen: no  acute distress, alert, interactive  HEENT: normal conjunctivae, EOMI  Nares: No discharge noted from nares bilaterally   CV: RRR, no murmurs; CR < 2 sec; radial pulses 2+ bl  Pulm: CTBA, no crackles or wheezing  Abd: soft, nl BS, non-tender to mild palpation, no HSM  Msk: no deformities  Extremities: trace pitting edema.   Neuro: moving all extremities spontaneously   Skin: no rashes, dry      Data   Data reviewed today: I reviewed all medications, new labs and imaging results over the last 24 hours. I personally reviewed the EKG tracing showing NSR.    Recent Labs   Lab 11/29/21  1756   WBC 15.9*   HGB 6.0*   MCV 88      INR 1.23*      POTASSIUM 3.8   CHLORIDE 101   CO2 29   BUN 30   CR 0.85   ANIONGAP 6   CHRISTINA 11.0*   *   ALBUMIN 2.2*   PROTTOTAL 5.9*   BILITOTAL 0.5   ALKPHOS 132   ALT 18   AST 24     Recent Results (from the past 24 hour(s))   CTA Abdomen Pelvis with Contrast    Impression    RESIDENT PRELIMINARY INTERPRETATION  IMPRESSION:   1. Large mass about the pancreatic tail eroding into the stomach again  demonstrated, representing the likely source of hemorrhage. No active  arterial bleeding demonstrated. Likely small focus of hemorrhage at  the anterior margin of the mass.  2. Redemonstration of multiple hepatic metastases, increased in size  in comparison with 10/25/2021.  3. Unchanged splenomegaly and occluded splenic artery.

## 2021-11-30 NOTE — PROVIDER NOTIFICATION
"Provider Notification     Niles Burks ([id number]) paged at [phone]:  \"FYI pt's hemoglobin this AM is 6.7 He does not have conditional orders to transfuse.\"     Response: Transfusion orders placed.       Will continue to monitor.   "

## 2021-11-30 NOTE — PROGRESS NOTES
Nursing Focus: Admission   D: Arrived at 0230 from ED via stretcher. Patient accompanied by nursing staff. Admitted for melena. No current complains.      I: Admission process began. Patient oriented to room, enviroment, call light. MD notified of patients arrival on unit.     A: Vital signs stable. Febrile, PRN Tylenol given.  Patient stable at this time.  No current complaints.     P: Implement plan of care when available. Continue to monitor patient. Nursing interventions as appropriate. Notify MD with changes in pt status.

## 2021-11-30 NOTE — PROVIDER NOTIFICATION
"Provider Notification     Niles Kimmie ([id number]) paged at [phone]:   \"Hi, pt has arrived to unit 7D. He spiked a temp in the ED and his current temp is 100. He is also complaining of some abdominal pain and requesting Tylenol. Can he have a PRN order for Tylenol? Thanks.\"    Response: Tylenol ordered.       Will continue to monitor.   "

## 2021-11-30 NOTE — CONSULTS
Oncology Consult Note   Date of Service: 11/30/2021    Patient: Markus Stallworth  MRN: [id number]  Admission Date: 11/29/2021  Hospital Day # 1   Primary Outpatient Oncologist: Dr. Kemp/Dr. Quintanilla    Reason for Consult: metastatic pancreatic cancer here for GI bleeding.     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 Dr. Kemp was concerned for erosion of his tumor into the splenic vasculature and the risk of a catastrophic bleed. His CTA showed Enlarging mass about the pancreatic tail eroding into the stomach, representing the likely source of hemorrhage.     Per Dr. Kemp patient currently is off therapy after progression on 2 lines of treatment. There are no standard therapies available, but he is hoping to get into a clinical trial. Dr. Kemp recommended focusing on his immediate issues with the bleeding, and planning for further discussions about what to do next once this has been addressed.    Patient had visited [facility] and [facility] multiple times regarding the clinical trials. There was initially 2 trials at the  and 3 at [place]. However there is none available until 1/2022. He is interested in getting a trial locally in [place] only since he would not want to spend his limited time far away from home.     # Acute posthemorragic anemia with Hgb baseline 11-12 in 10/2021. On CTA there is pancreatic cancer eroding into the stomach, which can cause bleeding. He also has NSAIDs use, so PUD is possible too.   # Metastic pancreatin adenocarcinoma to liver, progressed after FOLFIRINOX and gemcitabine and Abraxane, not on any treatment since 10/2021.    Recommendations:   - agree with GI consult- EGD,  avoid NSAIDs  - serial HGB and transfuse if HGB<7  - Will arrange f/u when is stable to be discharged.     Patient was seen and plan of care was discussed with attending physician Dr. Schafer     We will continue to follow up with you. Please don't hesitate to contact the Fellow On-Call with questions.    Surjit Lozada MD, PhD  Hematology/Oncology   Pager: [phone]    History of Present Illness:    Markus Stallworth is a 61 year old male initially presented late this spring with a large tumor in the tail of his pancreas and associated liver metastases.  He received initial treatment with FOLFIRINOX with questionable progression after 2 months and clear progression after 3 months.  He had disease progression after 2 months of gemcitabine and Abraxane. He was seen by Dr. Quintanilla and Dr. Mccarthy at [facility] for clinical trials. He was called in by Dr. Kemp for on and off abdominal pain, melena, hematemesis for the last 2-3 weeks. He has significant NSAIDs use in the last couples of weeks.    He is feeling weak gradually and is glad he is here. He has fever with second unit blood transfusion. No more bleeding episode since admission.      Oncology History:  Markus Stallworth is a 61 year old male with metastatic pancreatic cancer. He initially presented with modest left upper quadrant pain that led to an ultrasound and then further imaging demonstrating the presence of a large mass in the tail of his pancreas with associated splenic vascular involvement with splenomegaly and liver metastases.  A biopsy of one of his liver lesions shows an adenocarcinoma consistent with pancreatic primary. Targeted gene fusions of ALK, ROS1, RET, NTRK1, and NTRK3, as well as MET e14 skipping mutations were not detected in the analyzed sample. His port was placed on 6/4/21. He started on treatment with 5FU, irinotecan, and oxaliplatin (FOLFIRINOX) on 6/7/21. He received 6 cycles, last on 8/16/21. CT CAP on 8/23/21 showed disease progression. He  then started on treatment with Gemzar and Abraxane on 9/8/21. Due to neutropenia and thrombocytopenia, his treatment was changed from a day 1/8/15 every 28 day schedule to a day 1/15 every 28 day schedule with Neulasta.  He had repeat CT scan staging on 10/25/21 that showed progression of liver metastasis, pancreas mass as well as a new liver lesion.        Review of Systems: Pertinent positive and negative systems described in HPI; the remainder of the 14 systems are negative    Past Medical History:  Pancreatic cancer    Past Surgical History:  Past Surgical History:   Procedure Laterality Date     IR CHEST PORT PLACEMENT > 5 YRS OF AGE  6/4/2021       Social History:  Social History     Socioeconomic History     Marital status:      Spouse name: Not on file     Number of children: Not on file     Years of education: Not on file     Highest education level: Not on file   Occupational History     Not on file   Tobacco Use     Smoking status: Never Smoker     Smokeless tobacco: Never Used   Substance and Sexual Activity     Alcohol use: Not on file     Drug use: Not on file     Sexual activity: Not on file   Other Topics Concern     Not on file   Social History Narrative     Not on file     Social Determinants of Health     Financial Resource Strain: Not on file   Food Insecurity: Not on file   Transportation Needs: Not on file   Physical Activity: Not on file   Stress: Not on file   Social Connections: Not on file   Intimate Partner Violence: Not on file   Housing Stability: Not on file        Family History  Mother - breast CA  Brother - prostate CA, DMT2      Outpatient Medications:  No current facility-administered medications on file prior to encounter.  benzonatate (TESSALON) 100 MG capsule, Take 1-2 capsules (100-200 mg) by mouth 3 times daily as needed for cough (Patient not taking: Reported on 8/16/2021)  diphenoxylate-atropine (LOMOTIL) 2.5-0.025 MG tablet, Take 1-2 tablets by mouth 4 times daily as  needed for diarrhea (Patient not taking: Reported on 10/4/2021)  ibuprofen (ADVIL) 200 MG tablet, 1 tablet with food or milk as needed  lidocaine (XYLOCAINE) 2 % solution, Swish and swallow 10 mLs in mouth every 6 hours as needed (mouth sores) ; Max 8 doses/24 hour period. (Patient not taking: Reported on 10/4/2021)  loperamide (IMODIUM) 2 MG capsule, 2 caps at 1st sign of diarrhea & 1 cap every 2hrs until 12hrs diarrhea free. During night, 2 caps at bedtime & 2 caps every 4hrs until AM (Patient not taking: Reported on 10/4/2021)  loratadine (CLARITIN) 10 MG tablet, Take 10 mg by mouth daily  methocarbamol (ROBAXIN) 750 MG tablet, TAKE 1 TABLET BY MOUTH EVERY 4 HOURS AS NEEDED (Patient not taking: Reported on 8/2/2021)  OLANZapine (ZYPREXA) 2.5 MG tablet, Take 1 tablet (2.5 mg) by mouth At Bedtime  ondansetron (ZOFRAN-ODT) 8 MG ODT tab, Take 1 tablet (8 mg) by mouth every 8 hours as needed for nausea  prochlorperazine (COMPAZINE) 10 MG tablet, Take 1 tablet (10 mg) by mouth every 6 hours as needed (Nausea/Vomiting) (Patient not taking: Reported on 10/4/2021)  valACYclovir (VALTREX) 1000 mg tablet, TAKE TWO PILLS THEN REPEAT ONE TIME IN 12 HOURS THEN STOP. REPEAT FOR FUTURE OUTBREAKS AS NEEDED. (Patient not taking: Reported on 8/2/2021)  ZENPEP [id number] units CPEP, TAKE 2-3 CAPS WITH MEALS AND 1-2 CAPS WITH SNACKS         Physical Exam:    /59 (BP Location: Left arm)   Pulse 90   Temp 98.5  F (36.9  C) (Oral)   Resp 16   Wt 75.2 kg (165 lb 11.2 oz)   SpO2 98%   BMI 22.47 kg/m    Gen: Well appearing, in NAD  HEENT: EOMI  Pulm: normal work of breathing  Ext: No lower extremity edema  Skin: No rash, cyanosis or petechial lesion  Neuro: Alert and answering questions appropriately.   Psych: appropriate     Labs & Studies: I personally reviewed the following studies:  ROUTINE LABS (Last four results):  CMP  Recent Labs   Lab 11/29/21  1756      POTASSIUM 3.8   CHLORIDE 101   CO2 29   ANIONGAP 6   GLC  108*   BUN 30   CR 0.85   GFRESTIMATED >90   CHRISTINA 11.0*   PROTTOTAL 5.9*   ALBUMIN 2.2*   BILITOTAL 0.5   ALKPHOS 132   AST 24   ALT 18     CBC  Recent Labs   Lab 11/30/21  0610 11/30/21  0549 11/29/21  1756   WBC 15.6* 15.4* 15.9*   RBC 2.48* 2.42* 2.29*   HGB 6.9* 6.7* 6.0*   HCT 22.4* 22.2* 20.2*   MCV 90 92 88   MCH 27.8 27.7 26.2*   MCHC 30.8* 30.2* 29.7*   RDW 15.6* 15.6* 15.7*    234 263     INR  Recent Labs   Lab 11/30/21  0549 11/29/21  1756   INR 1.26* 1.23*       CT Chest/Abdomen/Pelvis w Contrast    Result Date: 10/25/2021  EXAMINATION: CT CHEST/ABDOMEN/PELVIS W CONTRAST, 10/25/2021 9:45 AM TECHNIQUE: Helical CT images from the thoracic inlet through the symphysis pubis were obtained with intravenous contrast utilizing pancreas mass protocol. Contrast dose: isovue 370 115mL COMPARISON: CT 8/23/2021, 7/22/2021, 5/24/2021 HISTORY: Pancreatic adenocarcinoma, assess treatment response; Malignant neoplasm of tail of pancreas (H) FINDINGS: Chest: Right chest wall Port-A-Cath with tip terminating in the low SVC. Thyroid gland is unremarkable. No supraclavicular or axillary lymphadenopathy. No mediastinal lymphadenopathy. Cardiac size is normal. Small amount of fluid in the aortic recess. No pericardial effusion. Aortic and pulmonary artery caliber within normal limits. Visualized portions of the aortic arch branching vessels are unremarkable. Esophagus is unremarkable. Trachea and bronchi are patent and clear of debris. Bibasilar dependent atelectasis/fibrotic scarring. No evidence of airspace disease. No new or suspicious pulmonary nodules. No pneumothorax. No pleural effusion. Abdomen and pelvis: Large centrally hypoattenuating mass centered about the pancreatic tail measuring approximately 9.1 x 7.4 x 6.7 cm (series 5 image 68), previously 7.4 x 6.9 x 6.3 cm when measured in similar axis. Compared to 8/23/2021, there is new extension/erosion through the inferior aspect of the lesser curvature of the  stomach (series 5 image 52) with air-containing material within the anterior aspect of the pancreatic mass (series 5 image 55). Persistent abutment against the splenic hilum. The remainder of the pancreatic head, neck and proximal body are unremarkable. Main pancreatic duct within the these portions is unremarkable. Numerous heterogeneously hypoattenuating metastatic lesions throughout the liver, the largest measuring 5.7 x 5.2 cm in hepatic segments 3 (series 5 image 40), previously 5.2 x 4.7 cm. Multiple additional increasing similar appearing metastatic lesions, including a lobulated 3.4 x 3.2 cm lesion in the right hepatic dome (series 5 image 23), previously 1.1 cm, and a new 2.4 cm lesion along the gallbladder fossa (series 5 image 45). Multiple additional hypoattenuating hepatic lesions, cysts versus indeterminate lesions. No intrahepatic biliary dilatation. Increased splenomegaly, measuring 15.1 cm in length. In the inferior spleen, there is a peripherally hyperintense, centrally hypodense lesion measuring 2.2 cm (series 7 image 25), previously 1.7 cm, and a 1.7 cm hypoattenuating lesion in the anterosuperior spleen (series 7 image 1:30), not clearly visualized on prior exam. Gallbladder is partially dilated and unremarkable. The right adrenal gland is unremarkable. The left adrenal gland is unremarkable although the left limb closely approximates the large pancreatic tail mass (series 7 image 167). Simple right renal cyst. Ureters and urinary bladder are unremarkable. Stomach and small intestine are unremarkable. Scattered colonic diverticula. The colon closely approximates the pancreatic tail mass at the splenic flexure without definite evidence of local invasion Scattered prominent retroperitoneal lymph nodes, not significantly changed prior. No free fluid. No pneumoperitoneum. Abdominal aorta is normal in caliber and patent. Celiac and mesenteric artery origins are unremarkable. Occlusion of the splenic  artery near its origin (series 5 image 69). Numerous splenorenal collaterals. Portal veins and IVC are patent. Bones and soft tissues: Multilevel Schmorl's node deformity. Endplate degenerative changes at L5-S1. No suspicious osseous abnormalities. Bilateral fat-containing inguinal hernias.     IMPRESSION: In this patient with history of metastatic pancreatic cancer, currently on chemotherapy: 1. Overall findings consistent with disease progression, including: a. Increased size of the primary pancreatic mass centered about the pancreatic tail with new erosion into the lesser curvature of the stomach. Small amount of air-containing material now within the anterior superior aspect of the pancreatic mass. b. Multiple increasing and new hepatic metastases, the largest measuring up to 5.7 cm in hepatic segment 3. New 2.4 cm lesion in in hepatic segment 5. c. Two hypoattenuating splenic lesions, parenchymal infarcts versus metastatic disease. d. Persist abutment of the pancreatic mass against the left adrenal gland and splenic hilum. 2. New splenic artery occlusion. 3. Splenomegaly with splenorenal collaterals. [Access Center: Metastatic disease progression with gastric involvement, splenic artery occlusion] Metastatic disease progression, splenic artery occlusionr to ensure a provider acknowledges the finding. Access Center is available Monday through Friday 8am-3:30 pm. I have personally reviewed the examination and initial interpretation and I agree with the findings. ANAHI WYNN MD   SYSTEM ID:  [id number]    CTA Abdomen Pelvis with Contrast    Result Date: 11/29/2021  EXAMINATION: CTA ABDOMEN PELVIS WITH CONTRAST, 11/29/2021 8:10 PM TECHNIQUE:  Helical CT images from the lung bases through the symphysis pubis were obtained with IV contrast in the late arterial phase and with 80 second delay. Contrast dose: iopamidol (ISOVUE-370) solution 103 mL COMPARISON: Chest abdomen pelvis CT 10/25/2021, 8/23/2021, 7/22/2021  HISTORY: GI bleed FINDINGS: Abdomen/Pelvis Liver: Multiple heterogeneously hypoattenuating hepatic lesions demonstrate increased size since 10/25/2021. For example, the largest lesion in hepatic segments II/III measures 7.2 x 6.9 cm , series 7 image 84, (previously 5.7 x 5.2. Lobulated lesion in the right hepatic dome, series 7 image 57 measures 5.6 x 5.5 cm (previously 3.4 x 3.2 cm. A few additional fluid attenuation hepatic cysts are unchanged, for example segment V/VI, series 7 image 143. Biliary: No radiodense gallstones. No intrahepatic or extrahepatic biliary dilation. Pancreas: Increased size of a large centrally hypoattenuating mass about the pancreatic tail measuring up to 9.1 cm in greatest dimension. The mass is eroding into the stomach with foci of air seen within the mass. There is a thin hyperdensity at the anterior margin of the mass, on arterial phase series 7 image 121, which does not blossom on delayed images. Faint hyperdensity is seen in this region on noncontrast images. Spleen: Mild splenomegaly, up to 13.5 cm in transverse dimension. Stable to slightly rim-enhancing foci in the inferior spleen. Adrenals: Abutment of the left adrenal gland by the above-described pancreatic mass. Kidneys: No hydronephrosis. No suspicious kidney lesion.  Unchanged simple cyst in the right kidney. Vascular: The celiac artery, SMA, CAMMY, and both renal arteries are patent. Accessory right renal artery is patent. Redemonstration of occlusion of the splenic artery near its origin. Obliteration of the splenic vein with multiple associated upper abdominal collaterals. Nonaneurysmal abdominal aorta.  Portal veins and hepatic veins are patent.  Bowel: Mass eroding into the stomach, described above. No dilated bowel.  Left colonic diverticulosis without evidence for diverticulitis. Nondilated appendix. Peritoneum/Retroperitoneum: No significant free fluid or free air.  Lymph nodes: No enlarged lymph nodes by short axis  criteria.  Pelvic organs: Unremarkable. Lower thorax: Mild subsegmental linear atelectasis in the lung bases. Trace pericardial effusion. Bones/Soft Tissues: Multilevel degenerative changes of the visualized spine.  Degenerative disc disease at L5-S1. Degenerative changes of the hip joints and sacroiliac joints. No acute or aggressive appearing bone lesion.     IMPRESSION: 1. Enlarging mass about the pancreatic tail eroding into the stomach again demonstrated, representing the likely source of hemorrhage. No active arterial bleeding demonstrated. Likely small focus of layering blood products at the anterior margin of the mass. 2. Redemonstration of multiple hepatic metastases, increased in size in comparison with 10/25/2021. 3. Unchanged splenomegaly and occluded splenic artery and vein. 4. Stable to slightly increased rim enhancing foci in the inferior spleen. I have personally reviewed the examination and initial interpretation and I agree with the findings. EUGENE IRIZARRY DO   SYSTEM ID:  [id number]

## 2021-11-30 NOTE — ED NOTES
Unable to get consistent blood return on port access after multiple trouble shooting attempts including having the patient cough, raise his right arm, and turbulent flushes. Patient denied any pain and no signs of infiltration noted.

## 2021-11-30 NOTE — PLAN OF CARE
[id number]    VSS on RA. Tmax 100. C/o pain, tylenol given with relief. Pt stated he does not want to take any narcotics. Denies nausea and SOB. Triggered sepsis, lactic was 1.2. Protonix infusing 10 mL/hr. Octreotide infusing 10 mL/hr. Pt has been NPO since midnight for procedure today. Port accessed by previous RN, no blood return noted. MD note states pt should be tele monitored, no box available on unit and no orders placed. No acute events overnight. Continue with plan of care.

## 2021-11-30 NOTE — PROGRESS NOTES
Brief GI Note:    Formal GI consult to follow in AM.    Note: patient not seen on this date by our service, this note is a product of chart review and discussion with the ED provider.     11/29/21    Briefly, Mr. Stallworth is a 61 yom with a pmhx significant for metatstatic pancreatic adenocarcinoma (Ct2, CN0, pM1) with known hepatic masses and splenic vasculature involvement. Currently has exhausted primary chemo and most recently seeking phase I trials.     He presents to the [place] ED with 24 hours of hematemesis (at first black, transitioning to maroon with clots) in a background of weeks with intermittent melena.     Of note, he has been taking up to 1800 mg per day of ibuprofen to accommodate his cancer-related pain.    No history of cirrhosis or varices.     Objective:    Temp: 98.4  F (36.9  C) Temp src: Oral BP: 105/56 Pulse: 83   Resp: 18 SpO2: 99 % O2 Device: None (Room air)       Labs:  Hemoglobin   Date Value Ref Range Status   11/29/2021 6.0 (LL) 13.3 - 17.7 g/dL Final   10/25/2021 11.4 (L) 13.3 - 17.7 g/dL Final   07/06/2021 11.9 (L) 13.3 - 17.7 g/dL Final   06/22/2021 12.9 (L) 13.3 - 17.7 g/dL Final     WBC: 15.9 (14.3 -10/25/21) with left shift    BUN trend: 30 (14, 11)    INR:   INR   Date Value Ref Range Status   11/29/2021 1.23 (H) 0.85 - 1.15 Final   06/04/2021 1.03 0.86 - 1.14 Final      Imaging:    CTA: 11/29/21 - pending    CT C/A/P: 10/25/21    IMPRESSION: In this patient with history of metastatic pancreatic  cancer, currently on chemotherapy:  1. Overall findings consistent with disease progression, including:  a. Increased size of the primary pancreatic mass centered about the  pancreatic tail with new erosion into the lesser curvature of the  stomach. Small amount of air-containing material now within the  anterior superior aspect of the pancreatic mass.  b. Multiple increasing and new hepatic metastases, the largest  measuring up to 5.7 cm in hepatic segment 3. New 2.4 cm lesion in  in  hepatic segment 5.  c. Two hypoattenuating splenic lesions, parenchymal infarcts versus  metastatic disease.  d. Persist abutment of the pancreatic mass against the left adrenal  gland and splenic hilum.  2. New splenic artery occlusion.  3. Splenomegaly with splenorenal collaterals.     Impression:    #. Acute blood loss anemia  #. Melena, hematemesis  #. Metastatic pancreatic adenocarcinoma  #. Elevated INR  Melena, hematemesis, recent significant NSAID use concerning for possible UGIB secondary to PUD. Another possibility would be local erosion/inivasion of pancreatic cancer given that most recent CT (10/25/21) had evidence of adjacent invasion along lesser curvature with associated air. Also, given both venous and arterial occlusion at the splenic hilum, gastric varices and/or pseudoaneurysm are possible  Mild azotemia also supportive of UGIB. Reassuring that patient is hemodynamically stable at present.    AIMS65: 1  GBS: 12    Recommendations:  -- we will follow along for CTA results  -- NPO, likely EGD in AM  -- obtain 2x large bore peripheral IVs, place patient on telemetry  -- IV PPI (drip or BID IV)  -- IV octreotide in case of gastric varices  -- type and screen  -- trend hgb at least q 6, narrower according to clinical trends  -- CMP, INR, hgb in AM  -- resuscitation targets:   -- HR < 100   -- SBP > 90   -- Hgb > 7  -- please contact GI fellow on call with any recurrent large volume hematemesis, hemodynamic instability, or substantial hgb drop despite transfusion  -- please place formal GI luminal consult for AM    Dago Rizo MD, PGY4  Gastroenterology Fellow  [facility]  See [facility] for GI on-call information    Case discussed with on call GI staff Dr. Wood.

## 2021-11-30 NOTE — PROGRESS NOTES
CLINICAL NUTRITION SERVICES - ASSESSMENT NOTE     Nutrition Prescription    RECOMMENDATIONS FOR MDs/PROVIDERS TO ORDER:  - Recommend checking Mg++ and PO4 with next BMP draw    Malnutrition Status:    - Unable to determine at this time    Recommendations already ordered by Registered Dietitian (RD):  - None     Future/Additional Recommendations:  - Monitor for diet adv/tolerance/intakes, need for calorie count monitoring to determine adequacy of intakes and need for nutritional support, pending aggressive of interventions.  - Monitor wt trneds       REASON FOR ASSESSMENT  Markus Stallworth is a/an 61 year old male assessed by the dietitian for positive Admission Nutrition Risk Screen for wt loss of 34 pounds or more and eating poorly because of a decreased appetite.    Per chart review, pt with h/o metastatic pancreatic Ca with liver mets, who presents to ED for evaluation of abd pain, melena and hematemesis (began 3 days ago). .    NUTRITION HISTORY  Per chart review (unable to obtain from pt at this time), pt has had worsening, generalized sharp/cramping upper abd pain over the past few weeks. Also reported to have intermittent nausea, but able to keep liquids down, and occasionally vomits with solid food intakes.      CURRENT NUTRITION ORDERS  Diet: NPO since admit (11/29)    LABS  K+ WNL yesterday, no data available today  No Mg++ or PO4 ordered yet this admission    MEDICATIONS  Medications reviewed    ANTHROPOMETRICS  Height: 0 cm (Data Unavailable). Height of 182.9 cm (6') per chart review.  Most Recent Weight: 74.6 kg (164 lb 8 oz)    IBW: 80.9 kg  BMI: Normal BMI  Weight History: Unable to obtain from pt at this time. Per review of EMR, wt has been trending downward since August with overall wt loss of 31 lbs (>15% loss) x > past 3 mos.  Wt Readings from Last 10 Encounters:   11/30/21 74.6 kg (164 lb 8 oz)   11/03/21 83 kg (183 lb)   10/18/21 85.5 kg (188 lb 6.4 oz)   10/04/21 85.1 kg (187 lb 9.6 oz)   09/20/21  84.8 kg (187 lb)   09/14/21 86.5 kg (190 lb 11.2 oz)   09/08/21 85.7 kg (189 lb)   08/31/21 81.2 kg (179 lb)   08/30/21 86.2 kg (190 lb)   08/16/21 88.7 kg (195 lb 8 oz)       Dosing Weight: 75 kg (based on wt of 74.6 kg today)    ASSESSED NUTRITION NEEDS  Estimated Energy Needs:2250+ kcals/day (30+ kcals/kg )  Justification: Repletion  Estimated Protein Needs:  grams protein/day (1.2 - 1.5 grams of pro/kg)  Justification: Hypercatabolism with acute illness  Estimated Fluid Needs: (1 mL/kcal)   Justification: Maintenance    PHYSICAL FINDINGS  See malnutrition section below.  Pale per chart review    MALNUTRITION  % Intake: Unable to assess  % Weight Loss: > 7.5% in 3 months (severe)  Subcutaneous Fat Loss: Unable to assess  Muscle Loss: Unable to assess  Fluid Accumulation/Edema: None noted per chart review  Malnutrition Diagnosis: Unable to determine due to unable to complete all parameters of nutritional assessment    NUTRITION DIAGNOSIS  Unintended weight loss related to question hypercatabolism with illness vs inadequate nutritional intakes as evidenced by wt loss of 31 lbs (>15% loss) x > past 3 mos.and underweight @ 92% of IBW.      INTERVENTIONS  Implementation  Nutrition Education: No education needs assessed at this time     Goals  1. Wt to remain > 74 kg  2. Diet adv within 24 to 48 hrs with patient able to tolerate and consume % of nutritionally adequate meal trays TID, or the equivalent with supplements/snacks over next 5-7 days.     Monitoring/Evaluation  Progress toward goals will be monitored and evaluated per protocol.    Marianne Eduardo RD,LD  7D pager [phone]

## 2021-11-30 NOTE — PLAN OF CARE
Red blood cells stopped at 0114 when temperature was 101. CN informed and MD was paged. Blood bank informed. Transfusion reaction investigation lab ordered. MD spoken to who also thought that it was transfusion reaction. Remainder of blood sent back to blood bank. MD stated that no more blood tx until lab checked. Report called to 7D and pt transferred.

## 2021-11-30 NOTE — OR NURSING
Patient had EGD and hemospray used for hemorrhage control.  BP soft, 500ml NS bolus hung and to be completed per Dr. Paige.  Patient tolerated procedure under conscious sedation and 2 liters nasal cannula.  Report called to 7D RNMaria.  Patient transported back to floor in stable condition.

## 2021-12-01 NOTE — PROGRESS NOTES
"        Hematology / Oncology  Daily Progress Note   Date of Service: 12/01/2021  Patient: Markus Stallworth  MRN: [id number]  Admission Date: 11/29/2021  Hospital Day # 2  Cancer Diagnosis: Metastic pancreatic adenocarcinoma  Primary Outpatient Oncologist: Dr. Kemp  Current Treatment Plan: TBD, working on getting in to a phase 1 clinical trial    Recommendations:   - Continue to follow serial HGB and transfuse if HGB<7  - Could consider IR consultation to assess if patient would be able to have embolization to help control bleeding  - Consider nutrition consult with weight loss and limited po intake  - Requested follow up with Dr Kemp or an NEELAM for early next week    Assessment & Plan:   61 year old male admitted on 11/29/2021. He has a pmhx of metastatic pancreatic cancer (diagnosed 5/2021) with known hepatic metastases and splenic vasculature involvement who presents symptomatic anemia, dark stools and dark emesis with concern for upper GI bleed likely due to enlarging mass about the pancreatic tail eroding into the stomach.    # Metastic Pancreatic Adenocarcinoma to liver  Follows with Dr Kemp.  See below for further details under \"oncologic history\".  In brief, patient was diagnosed with metastatic pancreatic cancer in 6/2021. Patient has quickly progressed after FOLFIRINOX then Gemzar + Abraxane. Last dose of any treatment 10/2021.  Patient has been undergoing evaluation for phase 1 trials under [facility] (with Dr Quintanilla) and [facility].  - Discussed patient with Dr Kemp today. Unfortunately patient is in a difficult position given large pancreatic mass eroding into stomach causes his UGI bleed. Patient would like to continue aggressive therapy and any treatment available.   Dr Kemp will reach out to Dr Quintanilla to discuss if patient can get into a clinical trial if possible.    # Upper GI Bleed  # Fungating Ulcerated Mass in lesser curvature of stomach  # Pancreatic Tail Mass, eroding in to the stomach  # " Anemia  Patient was using roughly 1600mg of ibuprofen daily for cancer related pain. Had been having hematemesis and melena at home. Last episode 11/27. CTA Abd/Pelvis (11/29/21) shows enlarging mass about the pancreatic tail eroding into the stomach, representing the likely source of hemorrhage. No active arterial bleeding demonstrated. Likely small focus of layering blood products at the anterior margin of the mass.  - GI on board, appreciate assistance  - EGD (11/30/21) shows 12cm fungating and ulcerated mass eroding the lesser curvature of the stomach. The mass was slowly oozing and this is likely the source of melena. Hemostatic spray was succesfully applied for hemostasis. The spray will only provide temporary hemostasis. This is not a long term solution. The mass is not amenable to endoscopic treatment.   - GI and Primary team to manage  - Continue to follow serial HGB and transfuse if HGB<7  - Could consider IR consultation to assess if patient would be able to have embolization to help control bleeding      Oncologic History:  Adapted from outpatient notes  - 06/2021: Initially presented with modest left upper quadrant pain that led to an ultrasound and then further imaging demonstrating the presence of a large mass in the tail of his pancreas with associated splenic vascular involvement with splenomegaly and liver metastases.     - A biopsy of one of his liver lesions shows an adenocarcinoma consistent with pancreatic primary.   - Targeted gene fusions of ALK, ROS1, RET, NTRK1, and NTRK3, as well as MET e14 skipping mutations were not detected in the analyzed sample.   - 6/4/21: Port Placed   - 6/7/21: Started on treatment with 5FU, irinotecan, and oxaliplatin (FOLFIRINOX)  - Received 6 cycles, last on 8/16/21.   - 8/23/21: CT CAP showed disease progression.  - 9/8/21: Started on treatment with Gemzar and Abraxane    - Due to neutropenia and thrombocytopenia, his treatment was changed from a day [1/8/15]  every 28 day schedule to a day [1/15] every 28 day schedule with Neulasta.    - 10/25/21: CT CAP showed progression of liver metastasis, pancreas mass as well as a new liver lesion.         Patient was seen and plan of care was discussed with attending physician Dr. Romeo.    Thank you for the opportunity to partake in this patients plan of care. Please do not hesitate to page with questions. We will continue to follow with you.     [employer] ([place]), PAMADELEINE   Hematology/Oncology   Pager: [phone]   ___________________________________________________________________    Subjective & Interval History:    No acute events noted overnight.  Markus is feeling a little better today. States he is having a little more energy after getting blood. Having pain in his epigastric area and LUQ. Would like to eat, trial clear liquid diet and is looking forward to full liquid diet. Patient states to me that he would like to continue treatment and any options available.  Denies fever, chills, mouth sores, SOB, cough, abdominal pain, diarrhea, constipation, nausea, vomiting, dysuria, hematuria, numbness, tingling, swelling      Complete and Comprehensive review of systems review and negative other than noted here or in the HPI.     Physical Exam:    Blood pressure 108/64, pulse 83, temperature 98.1  F (36.7  C), temperature source Oral, resp. rate 16, weight 74.6 kg (164 lb 6.4 oz), SpO2 97 %.    Constitutional: Pleasant male sitting in bed. Awake and conversational. Non- toxic appearing. No acute distress.   HEENT: Normocephalic, atraumatic. Sclerae anicteric. PERRLA. EOM intact. Moist mucus membranes without lesions, thrush, or exudates appreciated  Lymph: Neck supple. No significant adenopathy noted.   Respiratory: Breathing comfortable with no increased work on room air. Good air exchange. No signs of respiratory distress or accessory muscle use. Lungs clear to auscultation bilaterally, no crackles or wheezing  noted.  Cardiovascular: Regular rate and rhythm. Normal S1 and S2. No murmurs, rubs, or gallops. No peripheral edema.    GI: Abdomen is soft, non-distended, tender with light palpation. Bowel sounds present and normoactive.   Skin: Skin is clean, dry, intact. No jaundice appreciated.   Musculoskeletal/ Extremities: No redness, warmth, or swelling of the joints appreciated. Distal pulses palpable. Nailbeds pink and without cyanosis or clubbing.   Neurologic: Alert and oriented. Speech normal. Grossly nonfocal. Memory and thought process preserved. .   Neuropsychiatric: Calm, affect congruent to situation. Appropriate mood and affect. Good judgment and insight. No visual/auditory hallucinations.       Labs & Studies: I personally reviewed the following studies:  ROUTINE LABS (Last four results):  CMP  Recent Labs   Lab 12/01/21  0525 11/30/21  1820 11/29/21  1756    134 136   POTASSIUM 4.2 4.6 3.8   CHLORIDE 102 103 101   CO2 29 29 29   ANIONGAP 5 2* 6   * 169* 108*   BUN 20 21 30   CR 0.72 0.80 0.85   GFRESTIMATED >90 >90 >90   CHRISTINA 10.6* 10.4* 11.0*   PROTTOTAL 5.6*  --  5.9*   ALBUMIN 2.0*  --  2.2*   BILITOTAL 0.7  --  0.5   ALKPHOS 104  --  132   AST 19  --  24   ALT 14  --  18     CBC  Recent Labs   Lab 12/01/21  0525 11/30/21  2351 11/30/21  1820 11/30/21  1336 11/30/21  0610 11/30/21  0549 11/29/21  1756   WBC 14.2*  --   --   --  15.6* 15.4* 15.9*   RBC 2.70*  --   --   --  2.48* 2.42* 2.29*   HGB 7.5* 7.2* 7.7* 8.2* 6.9* 6.7* 6.0*   HCT 24.7*  --   --   --  22.4* 22.2* 20.2*   MCV 92  --   --   --  90 92 88   MCH 27.8  --   --   --  27.8 27.7 26.2*   MCHC 30.4*  --   --   --  30.8* 30.2* 29.7*   RDW 15.6*  --   --   --  15.6* 15.6* 15.7*     --   --   --  245 234 263     INR  Recent Labs   Lab 11/30/21  0549 11/29/21  1756   INR 1.26* 1.23*       Medications list for reference:  Current Facility-Administered Medications   Medication     acetaminophen (TYLENOL) tablet 650 mg      amylase-lipase-protease (ZENPEP) 27634-29999 units delayed release capsule 2-4 capsule     amylase-lipase-protease (ZENPEP) 97857-80041 units delayed release capsule 4-6 capsule     cefTRIAXone (ROCEPHIN) 1 g vial to attach to  mL bag for ADULTS or NS 50 mL bag for PEDS     menthol (Topical Analgesic) 2.5% (BENGAY VANISHING SCENT) 2.5 % topical gel     ondansetron (ZOFRAN) injection 4 mg     pantoprazole (PROTONIX) IV push injection 40 mg     sodium chloride (PF) 0.9% PF flush 3 mL

## 2021-12-01 NOTE — PLAN OF CARE
Pt given N95 and instructed to wear it all times. All staff providing care to the pt are wearing N95.

## 2021-12-01 NOTE — PLAN OF CARE
[id number]    VSS on RA. Tmax 99.3. C/o pain, gave tylenol x2 and bengay cream x1 with some relief. Denies nausea and SOB. Clear liquid diet continued. No acute events overnight. Continue with plan of care.

## 2021-12-01 NOTE — PROGRESS NOTES
[facility]-Cover Brief Note:    Follow up hemoglobin for this patient was 7.7 at 1820 and BUN fortunately normal. Fever spike to 100.8F 1957. Vitals stable. CT Abdomen/Pelvis was done at admission. Discussed case briefly with GI who recommended discontinue Octreotide and start Rocephin 1g Q24H. Appreciate their assistance and recommendations.    Work Up Initiated: Blood cultures, lactate, UA, chest XR.    Discussed with nursing who plan to promptly page Medicine team with any hypotension, lightheadedness, tachycardia or other concerning clinical deterioration. Next hemoglobin check scheduled for 2330.    Yuriy Castorena PA-C on 11/30/2021 at 8:37 PM  This is a non-billable encounter.

## 2021-12-01 NOTE — PLAN OF CARE
Blood pressure 110/64, pulse 94, temperature 99.9  F (37.7  C), temperature source Oral, resp. rate 18, weight 74.6 kg (164 lb 6.4 oz), SpO2 95 %.      Tmax 99.9F. OVSS. Tylenol x1 and Bengay cream x1 for LUQ abdominal pain. NPO except for meds. Port and PIV patent. COVID and Influenza came back negative.    Continue w/ POC.

## 2021-12-01 NOTE — PLAN OF CARE
[id number]: Pt A&Ox4, pressures soft, febrile at 100.8F, OVSS. Pt reports 3/10 ABD pain, notified provider for tylenol alternative. Pt UA needs collection, pt back up from chest x-ray, antibiotic hung. L PIV removed due to catheter damage, PIV replaced. Port infusing. Q6 Hgb check at 1800 came back 7.7, no transfusion given. Pt on clear liquid diet orders. Continue to monitor and with POC.

## 2021-12-01 NOTE — PROGRESS NOTES
[facility]    Medicine Progress Note - Hospitalist Service, Gold 7       Date of Admission:  11/29/2021    Assessment & Plan    Markus Stallworth is a 61 year old male admitted on 11/29/2021. He has a pmhx of metastatic pancreatic cancer (diagnosed 5/2021) who presents with acute blood clot anemia likely 2/2 upper GI bleed      Anemia - Hgb 6.9 on admission   Melena/Hematemesis  Upper GI bleed   Reported use of ~1600 mg of ibuprofen daily  X 1 month for cancer related pain management vs CTA ordered and prelim read indicates that pancreatic mass eroding into the stomach may be etiology of bleed. Has not had recurrent episodes of hematemesis or melena since arrival to ED. last episode of emesis with blood clots was on Saturday 11/27.  GI consulted in the ED will manage as below.   - s/p EGD 11/30: 12 CM fungating/ulcerated mass eroding through stomach. Actively oozing. Hemostatic spray applied. Not a long term solution.   - IR consulted for possibility of other treatment options.  - Continue to trend CBC Q12H   - On clear liquid diet now, requesting to be advanced.  - Continue PPI      Possible transfusion reaction vs Infection   Leukocytosis  Patient had fevers to 101 during his packed red blood cells transfusion on 11/29. Subsequently transfusion was stopped due to possible transfusion reaction. Patient notes that he felt fevers during that time alongside his chronic abdominal pain.  Hemoglobin 6.9 in a.m.   - Tolerated transfusion of second unit of PRBCs on 11/30/2021   - Leukocytosis persistent, but could be reactive. No fevers since yesterday evening. Blood cultures with NGTD.  - Continue ceftriaxone for now.     Hx of metastatic pancreatic adenocarcinoma w/ hepatic involvement  ECOG status 1  Follows with Dr. Ra Quintanilla, last seen in clinic 11/3/21. Has been looking into research trials for treatment.  Admitting team broached subject of palliative care with patient for  pain management and he was interested in potentially following up outpatient.  - Oncology consulted, appreciate recs  - Palliative care outpatient referral on discharge. Patient would prefer to speak to palliative care after he leaves the hospital.     Nausea/Vomiting  Patient not complaining of active NV. Has olanzapine prescribed for this.  - Hold PTA olanzapine for now   - PRN zofran     Diet: Advance Diet as Tolerated: Full Liquid Diet    DVT Prophylaxis: SCDs  Rizzo Catheter: Not present  Central Lines: None  Code Status: Full Code      Disposition Plan   Expected discharge: 12/03/2021   recommended to prior living arrangement once adequate pain management/ tolerating PO medications and hemoglobin stable.     The patient's care was discussed with the Care Coordinator/ and Patient.    Blanca Pat MD  Hospitalist Service, [address]  Securely message with the Vocera Web Console (learn more here)  Text page via [employer] Paging/Directory    Please see sign in/sign out for up to date coverage information  ______________________________________________________________________    Interval History   No acute events overnight.  Patient reports feeling his best today in the whole hospitalization.   He has not had any bowel movements since Saturday prior to coming to hospital.  He denies nausea or vomiting. On a CLD but would like more to eat.   He has not had a fevers since yesterday evening. Has occasional chills but he had this prior to admission.   He does not have significant pain. States the tylenol and menthol cream are sufficient.   He is emotional about his current condition but thankful for some of the little things.       Physical Exam   Vital Signs: Temp: 97.1  F (36.2  C) Temp src: Oral BP: 110/65 Pulse: 81   Resp: 16 SpO2: 99 % O2 Device: None (Room air) Oxygen Delivery: 2 LPM  Weight: 164 lbs 6.4 oz  General: Chronically ill appearing. In  NAD.  HEENT: NCAT, EOMI  CV: Normal S1S2, regular rhythm, normal rate  Lungs: CTAB  Abdomen: Soft, NT, ND, +hypoactive BS  Extremities: No LE edema b/l  Neuro: AAOx3.   Psych: Normal Affect.     Data   Recent Labs   Lab 12/01/21  0525 11/30/21  2351 11/30/21  1820 11/30/21  1336 11/30/21  0610 11/30/21  0549 11/29/21  1756   WBC 14.2*  --   --   --  15.6* 15.4* 15.9*   HGB 7.5* 7.2* 7.7*   < > 6.9* 6.7* 6.0*   MCV 92  --   --   --  90 92 88     --   --   --  245 234 263   INR  --   --   --   --   --  1.26* 1.23*     --  134  --   --   --  136   POTASSIUM 4.2  --  4.6  --   --   --  3.8   CHLORIDE 102  --  103  --   --   --  101   CO2 29  --  29  --   --   --  29   BUN 20  --  21  --   --   --  30   CR 0.72  --  0.80  --   --   --  0.85   ANIONGAP 5  --  2*  --   --   --  6   CHRISTINA 10.6*  --  10.4*  --   --   --  11.0*   *  --  169*  --   --   --  108*   ALBUMIN 2.0*  --   --   --   --   --  2.2*   PROTTOTAL 5.6*  --   --   --   --   --  5.9*   BILITOTAL 0.7  --   --   --   --   --  0.5   ALKPHOS 104  --   --   --   --   --  132   ALT 14  --   --   --   --   --  18   AST 19  --   --   --   --   --  24    < > = values in this interval not displayed.

## 2021-12-01 NOTE — PLAN OF CARE
[id number]    No acute event, Temp Max 98.4, VSS.   Pt was on clear liquid diet, now NPO for IR procedure.   C/O upper abdominal pain, 1x PRN tylenol give.   Pt. Had one dark stool. Not loose, formed.   COVID-19 test sent to lab at 1500      NEURO: Alert and oriented x4.      RESPIRATORY: Room Air, denies dizziness, and SOB. Lungs sound, clear, equal bilateral.     CARDIO: VSS, denies dizziness, and extremity pain.      GI/:  Denies N/V, BS active, BM x 1, void urine spontaneously without saving.       SKIN: Intact.      ACTIVITY: Independent, use walker      PAIN: Yes, Left upper quadrant abdominal pain (likely from his pancreas)    DLA: Port and PIV.     BG: No      PLAN:   - If ongoing bleeding, patient may benefit from Interventional radiology with angiogram or radiation therapy.   - Please avoid NSAIDs   -Continue supportive cares with iron and PRBC as needed  - Continue IV pantoprazole 40 mg BID, PO BID upon discharge  - Tumor bleeding is not amenable to endoscopic intervention  -No further inpatient recommendations, please call GI team with questions or concerns      Continue monitoring.

## 2021-12-01 NOTE — PROGRESS NOTES
GI Signoff Note  - If ongoing bleeding, patient may benefit from Interventional radiology with angiogram or radiation therapy.   - Please avoid NSAIDs   -Continue supportive cares with iron and PRBC as needed  - Continue IV pantoprazole 40 mg BID, PO BID upon discharge  - Tumor bleeding is not amenable to endoscopic intervention  -No further inpatient recommendations, please call GI team with questions or concerns    YUMIKO Vasquez CNP on 12/1/2021 at 1:56 PM    Discussed with GI Staff Dr Paige

## 2021-12-02 NOTE — PROVIDER NOTIFICATION
"Provider Notification     Niles Zuñiga ([id number]) paged at [phone]:  \"Hi pt is requesting something PRN to help him sleep. He stated that melatonin has not helped in the past. Thanks.\"    Response: Trazodone ordered.      Will continue to monitor.   "

## 2021-12-02 NOTE — CONSULTS
IR was consulted regarding patient Markus Stallworth. Patient has a large pancreatic tumor eroding into the stomach. IR was consulted for possible embolization of the tumor after admission for GI bleed.     Patient's imaging was reviewed by interventional radiology staff. An angiogram could certainly be performed of the tumor but the location and nature of the tumor leads team to believe that the blood supply is likely large and coming from several different areas. As a result there would be a low likelihood of a target for embolization.    Talking with the team today the patient is not having any evidence of GI bleeding. While an angiogram could be performed there is a very low likelihood that an embolization could be performed due to the nature of the patient's imaging.    Case discussed with interventional radiology staff and the requesting team.    Amor Rodriguez PA-C  Interventional Radiology  Phone: [phone]  Pager: [phone]

## 2021-12-02 NOTE — PLAN OF CARE
[id number]    VSS on RA. Afebrile. Trazodone given x1 for sleep. Denies nausea and SOB. C/o pain, gave tylenol x2 and bengay cream x1 with some relief. Pt has been NPO since midnight for IR consult today. No acute events overnight. Continue with plan of care.

## 2021-12-02 NOTE — PROVIDER NOTIFICATION
"Provider Notification     Niles Zuñiga ([id number]) paged at [phone]:  \"FYI pt has a PORT but no PRN heparin orders. Thanks!\"   "

## 2021-12-02 NOTE — PLAN OF CARE
[id number]  Afebrile, VSS. No acute event.  Pt is in pain, (upper abdominal), see (MAR) for medications.   Pt doesn't have heparin order to lock the port.   Port NS locked.       NEURO: Alert and oriented x4.      RESPIRATORY: Room Air, denies dizziness, and SOB. Lungs sound, clear, equal bilateral.     CARDIO: VSS, denies dizziness, and extremity pain.      GI/:  Denies N/V, BS active, No BM reported in this shift, Void urine spontaneously without saving.       SKIN: Intact.      ACTIVITY: Stand by assist/ independent, uses walker for safety.      PAIN: Yes, Abdominal pain.     DLA: Port, and PIV.     BG: NO      PLAN:     Continue monitoring.

## 2021-12-02 NOTE — PLAN OF CARE
[id number]  Febrile- Tmax 100.6. Tachy at 103. OVSS on RA. Asymptomatic. No s/sx of COVID 19 present. Provider updated on temp and HR. Blood cultures ordered. Triggered sepsis; LA 0.6. Tylenol given x1 for temp and abdominal pain. Continues on rocephin IV.  NPO for IR consult for possible embolization of tumor bleeding. Continue with POC.

## 2021-12-02 NOTE — TELEPHONE ENCOUNTER
Pt's daughter Renee calling to find out protocol about exposed fully vaccinated family member who has no symptoms and tested negative and would like to visit parent.     Advised Renee per protocol if no symptoms and Covid precautions followed per CDC no need to isolate. Go to CDC website for more information. Caution advised in visiting any high risk person if exposed.     Renee verbalizes understanding and agrees to plan.     Reason for Disposition    COVID -19 Disease, questions about    COVID-19 Prevention and Healthy Living, questions about    [1] CLOSE CONTACT COVID-19 EXPOSURE within last 14 days AND [2] NO symptoms    COVID-19 Home Isolation, questions about    Protocols used: CORONAVIRUS (COVID-19) EXPOSURE-- 8.25.2021, CORONAVIRUS (COVID-19) DIAGNOSED OR [id number] 8.25.2021

## 2021-12-02 NOTE — PROGRESS NOTES
"CLINICAL NUTRITION SERVICES - BRIEF NOTE     Nutrition Prescription    Recommendations already ordered by Registered Dietitian (RD):  Modify flavor of ONS, Ensure Enlive to chocolate flavor only per pt preference [with meals]     Future/Additional Recommendations:  -Encourage PO intake; advance diet as tolerated.   -Once able to progress diet beyond full liquids, pt would like a Special K protein bar to be sent between meals   -Monitor weight trends as available with significant loss     NEW FINDINGS   -Diet now advanced to full liquids; start of ONS ordered by MD  -Obtained diet hx from pt today. Poor appetite/intakes since June when chemotherapy started. Significant wt loss. Pt reports appetite and intakes worsened since stopping chemo in October because \"the tumor grew into my stomach\".   -Currently feels hungry; wants diet to be advanced beyond liquids. Likes ONS, prefers chocolate flavor. Would like a protein bar when able to have, but is not allowed on current diet order. Will follow for diet progression.  -Possible discharge tomorrow per pt and chart review.    INTERVENTIONS  Implementation  Medical food supplement therapy - modify flavor per pt preference  Education - Discussed small freq meals, high Kcal/pro foods, ONS    Monitoring/Evaluation  Will continue to monitor and evaluate per protocol.    Jose Patrick RDN, LD, [facility]   6A/7D RD pager [phone]      "

## 2021-12-02 NOTE — PROGRESS NOTES
[facility]    Medicine Progress Note - Hospitalist Service, Gold 7       Date of Admission:  11/29/2021    Assessment & Plan    Markus Stallworth is a 61 year old male admitted on 11/29/2021. He has a pmhx of metastatic pancreatic cancer (diagnosed 5/2021) who presents with acute blood clot anemia likely 2/2 upper GI bleed      Anemia - Hgb 6.9 on admission   Melena/Hematemesis  Upper GI bleed   Reported use of ~1600 mg of ibuprofen daily  X 1 month for cancer related pain management vs CTA ordered and prelim read indicates that pancreatic mass eroding into the stomach may be etiology of bleed. Has not had recurrent episodes of hematemesis or melena since arrival to ED. last episode of emesis with blood clots was on Saturday 11/27.  GI consulted in the ED will manage as below.   - s/p EGD 11/30: 12 CM fungating/ulcerated mass eroding through stomach. Actively oozing. Hemostatic spray applied. Not a long term solution.   - Discussed with IR yesterday and today, not a good candidate for embolization   - Continue to trend CBC Q12H   - On full liquid diet, advance as tolerated  - Continue PPI      Fevers  - Tmax 100.6. WBC is elevated. Unclear the source. Fungating mass?  - He is receiving tylenol round the clock so this is likely masking any possible fevers.  - Recent discovery of COVID exposure prior to hospitalization. Asymptomatic. COVID test neg yesterday. Repeating today.  - Blood cultures 11/30 ngtd. Repeated 12/1. Procal is elevated.  - Change ceftriaxone to Zosyn for now and continue to monitor.     Possible transfusion reaction vs Infection   Leukocytosis  Patient had fevers to 101 during his packed red blood cells transfusion on 11/29. Subsequently transfusion was stopped due to possible transfusion reaction. Patient notes that he felt fevers during that time alongside his chronic abdominal pain.   - Tolerated transfusion of second unit of PRBCs on 11/30/2021   -  Leukocytosis persistent, but could be reactive. Blood cultures with NGTD.     Hx of metastatic pancreatic adenocarcinoma w/ hepatic involvement  ECOG status 1  Cancer Pain   Follows with Dr. Ra Quintanilla, last seen in clinic 11/3/21. Has been looking into research trials for treatment.  Admitting team broached subject of palliative care with patient for pain management and he was interested in potentially following up outpatient.  - Oncology consulted, appreciate recs  - Appears to be with more pain. He is reluctant to take opiates but finally agreed to one time dose of dilaudid. PRN oxycodone.   - Also, Tylenol now 975 mg Q8H and trial of lidocaine patch.  - Palliative care outpatient referral on discharge. Patient would prefer to speak to palliative care after he leaves the hospital.     Nausea/Vomiting  Patient not complaining of active NV. Has olanzapine prescribed for this.  - Hold PTA olanzapine for now   - PRN zofran     Diet: Advance Diet as Tolerated: Full Liquid Diet  Snacks/Supplements Adult: Ensure Enlive; Between Meals    DVT Prophylaxis: SCDs  Rizzo Catheter: Not present  Central Lines: None  Code Status: Full Code      Disposition Plan   Expected discharge: 12/03/2021   recommended to prior living arrangement once adequate pain management/ tolerating PO medications and hemoglobin stable.     The patient's care was discussed with the Care Coordinator/ and Patient.    Blanca Pat MD  Hospitalist Service, [address]  Securely message with the Vocera Web Console (learn more here)  Text page via [facility] Paging/Directory    Please see sign in/sign out for up to date coverage information  ______________________________________________________________________    Interval History   Febrile overnight to 100.6.   Today he seems to be having more pain. HE seems more agreeable to trying opiates. He is also hungry. He is worried he is losing  weight too quickly.   Currently afebrile. No chills. Pain is rated as a 5/10. He has not had any bowel movements since admission.       Physical Exam   Vital Signs: Temp: 98.9  F (37.2  C) Temp src: Oral BP: 110/60 Pulse: 91   Resp: 18 SpO2: 98 % O2 Device: None (Room air)    Weight: 162 lbs 11.19 oz  General: Chronically ill appearing. In NAD.  HEENT: NCAT, EOMI  CV: Normal S1S2, regular rhythm, normal rate  Lungs: CTAB  Abdomen: Soft, NT, ND, +hypoactive BS  Extremities: No LE edema b/l  Neuro: AAOx3.   Psych: Normal Affect.     Data   Recent Labs   Lab 12/02/21  0637 12/01/21  1740 12/01/21  0525 11/30/21  2351 11/30/21  1820 11/30/21  0610 11/30/21  0549 11/29/21  1756   WBC 17.2* 17.7* 14.2*  --   --    < > 15.4* 15.9*   HGB 7.5* 7.8* 7.5*   < > 7.7*   < > 6.7* 6.0*   MCV 92 92 92  --   --    < > 92 88    257 222  --   --    < > 234 263   INR  --   --   --   --   --   --  1.26* 1.23*     --  136  --  134  --   --  136   POTASSIUM 3.9  --  4.2  --  4.6  --   --  3.8   CHLORIDE 100  --  102  --  103  --   --  101   CO2 28  --  29  --  29  --   --  29   BUN 17  --  20  --  21  --   --  30   CR 0.69  --  0.72  --  0.80  --   --  0.85   ANIONGAP 8  --  5  --  2*  --   --  6   CHRISTINA 11.0*  --  10.6*  --  10.4*  --   --  11.0*   GLC 88  --  111*  --  169*  --   --  108*   ALBUMIN  --   --  2.0*  --   --   --   --  2.2*   PROTTOTAL  --   --  5.6*  --   --   --   --  5.9*   BILITOTAL  --   --  0.7  --   --   --   --  0.5   ALKPHOS  --   --  104  --   --   --   --  132   ALT  --   --  14  --   --   --   --  18   AST  --   --  19  --   --   --   --  24    < > = values in this interval not displayed.

## 2021-12-02 NOTE — PROVIDER NOTIFICATION
"Provider Notification     Niles Zuñiga ([id number]) paged at 0600:  \"Hi, pt is reporting that the Bengay cream has been helping with his abdominal pain. This med is scheduled Q6hr. Can we give this sooner than 6 hours apart? Thanks.\"      No interventions ordered. Will continue to monitor.   "

## 2021-12-02 NOTE — PROGRESS NOTES
Brief Cross Coverage Medicine Note    Contacted by nursing regarding patient febrile to 100.6F. Prior infectious work up negative with clear CXR, clean UA, negative COVID 19 PCR. Prior blood cultures on 11/30 NGTD. Currently on Ceftriaxone with current GIB. Remaining VS notable for mild tachycardia, otherwise normotensive and normal RR and O2 saturations.     Will repeat blood cultures x2.       Sridevi Holt PA-C  Hospitalist Service

## 2021-12-03 NOTE — CONSULTS
"[facility]  Palliative Care Consultation Note    Patient: Markus Stallworth  Date of Admission:  11/29/2021    Requesting Clinician / Team: Rogers Song, Dr. Pat  Reason for consult: Pain management, Symptom management, Goals of care, Support    Recommendations:    Full Code    Goals: Restorative treatment goals with hope to get home and be able to attend his daughter's wedding on 12/29/21    Disease/prognosis understanding: patient understands his condition is terminal and is concerned that time is getting short. Says this hospitalization was a \"wake up call\" to make him and his family realize how serious his situation is. He remains hopeful that he will live longer but is also starting to realize and prepare for possibility time is getting short. He really wants to live to see his daughter get  at the end of December. See summary below for more details about the discussion including code status discussion.     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     If needing additional PRN for pain, would recommend 0.3mg dilaudid     Scheduled 8.6mg senna at bedtime (changed for your) in addition to PRN bowel regimen. Patient may decline if having loose stools    Consider tranexamic acid for upper GI bleed if considered appropriate by GI (per palliative care guidelines, consider 1-2 grams QID; most common side effect to watch for is GI upset)    Palliative will continue to follow and check in on Pain, GOC, Support. Will make referral for outpatient clinic as well      Bri Meléndez, PGY2  Internal " Medicine-Pediatrics  Palliative Care Resident Rotator     These recommendations have been discussed with Dr. Cassandra Pittman MD / Palliative Medicine        Thank you for the opportunity to participate in the care of this patient and family. Our team: will continue to follow.     During regular M-F work hours -- if you are not sure who specifically to contact -- please contact us by sending a text page to our team consult pager at [phone].    After regular work hours and on weekends/holidays, you can call our answering service at [phone]. Also, who's on call for us is available in [facility] Smart Web.           Patient seen and evaluated with Dr. Meléndez.   Agree with assessment and recommendations.  Total time spent was 90 minutes spent in reviewing record, patient examination and family counseling, discussion with primary team and documentation.  >50% of time was spent counseling and/or coordination of care regarding goals of care and pain management.    Shahida Martínez  Palliative Care   Pager [phone]        Assessments:  Markus Stallworth is a 61 year old male with a past medical history significant for metastatic pancreatic cancer (diagnosed 5/2021, liver and spleen involvement) who was admitted on 11/29/2021 for symptomatic anemia 2/2 an upper GIB.     Oncology is consulted and in terms of his pancreatic cancer, the patient is currently off therapy after progression on 2 lines of treatment. There are no standard therapies available, but he is hoping to get into a clinical trial. However, his immediate issues of GI bleeding would need to be addressed prior to that. GI was consulted and completed an EGD on 11/30 which showed fungating tumor invasion into the stomach that was not amenable to endoscopic intervention. IR feels that he is not a good candidate for embolization. Additionally, he has been having cancer-related pain and was taking ~1600mg IBU/day x1mo prior to admission and is reluctant to trying  "opioids due to family hx of addiction. Palliative Care was consulted by the primary team to assist with pain/symptom management, support and goals of care.       Today, the patient was seen for:  Acute blood loss anemia 2/2 upper GIB  Metastatic Pancreatic Cancer with liver, spleen, and possible stomach involvement  Fever with leukocytosis   Goals of Care  Cancer related pain  Emotional support    Prognosis, Goals, & Planning:      Functional Status just prior to hospitalization: 1 (Restricted in physically strenuous activity but ambulatory and able to carry out work of a light or sedentary nature) States that was working (as a ) up until mid November.      Prognosis, Goals, and/or Advance Care Planning were addressed today: Yes   Discussed the rapid progression of his cancer, despite going through two different chemo treatments. Markus and Yamile state that this hospitalization was a wake up call for the family as many of their kids think he will fight this. Markus states that he wants to keep fighting, especially as his kids want him to, but is accepting of death if it's the plan (by God). However after being admitted, his kids are more worried about his prognosis. Markus's biggest goal is to get home and attend his daughter's (Randee's) wedding on 12/29/21. He notes that he recently revised his Will/trust funds and filled out an advanced care directive with a  (Not on file in our records). When discussing code status, he indicates that he does not want to \"be on a machine long term\" but would be willing to have CPR/intubation if it would give him more meaningful time to spend with his family. We discussed that with advanced cancer that benefit from these more heroic treatments is often quite limited and discussed that in the future, if he becomes more ill, that doing CPR/intubation may cause more harm than good, and may prevent him from being able to go home. If that ever becomes that case, that he would like " "to change his code status, we would be able to provide him support and guidance.        Patient's decision making preferences: independently, with support from loved ones and medical providers          Patient has decision-making capacity today for complex decisions: Yes            I have concerns about the patient/family's health literacy today: No           Patient has a completed Health Care Directive: Reportedly yes, but not available to us currently.      Code status: Full Code    Coping, Meaning, & Spirituality:   Mood, coping, and/or meaning in the context of serious illness were addressed today: Yes  Markus states that he has a strong mentality that originates from his time in the [employer] where he has embodied \"no pain, no gain\" mentality. He also states that he is spiritually doing well and has had several members of the clergy ([employer]) who have reached out/visited. He believes that suffering is a part of his govind and is not fearful of it. When offered Palliative care spirituality/ services, he declines at this time.     Although Markus feels that he has mentally coped with his illness, he sees and worries about the impact it is having on his family. He states that his wife is \"mentally strong but fearful and worried\" about the future. Some of his kids  (total of 5) are \"weak and worried\" and the other kids are \"various iterations of that\"     Social:     Living situation: Lives at home with his wife     Gonzalez family / caregivers: Wife (Yamile) and 5 kids    Occupational history: . 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 Prior to being a , he was in the [employer]     Substance use history: He has had 2 kids go through addiction. One daugher in particular (Randee) has gone through heroine addiction, lived on the streets,  and nearly " "lost her life several times. She is now sober 2 years after going through treatment.     Financial concerns: Markus and Yamile have revised their Will and trust funds     History of Present Illness:  History gathered today from: patient, family/loved ones, medical chart, medical team members    Markus Stallworth is a 61 year old male with a past medical history significant for metastatic pancreatic cancer (diagnosed 5/2021, liver and spleen involvement) who presented on 11/29/2021 x2 episodes of hematemesis and dark stools.    For the past 3 weeks he has had \"tarry stools\" on and off. Then last Friday (11/26) he had an episode of emesis that was black and then had another episode on Saturday (11/27). He came to the ED after talking to the nursing line with the above symptoms. He does state that he has been taking approximately 400 mg ibuprofen 3-4 times daily for the past month to control his abdominal pain 2/2 his pancreatic cancer. States otherwise he has been getting weaker and losing weight for the past few weeks.     He otherwise denies any fever, cough, diarrhea, no urinary symptoms or rashes. States that he has noticed that sometimes when he is reading that he will have a hard time focusing where he \"blanks\" out. No other focal weaknesses or syncope episodes. No chest pain.     Key Palliative Symptom Data:  Pain location: Mainly LUQ  # Pain severity the last 12 hours: low  We are not managing dyspnea in this patient  # Nausea severity the last 12 hours: none  # Anxiety severity the last 12 hours: none    Patient is on opioids: assessed and I made recommendations about bowel care as above.    ROS:  Besides above, a complete 10+ ROS was reviewed and is unremarkable      Past Medical History:  No past medical history on file.     Past Surgical History:  Past Surgical History:   Procedure Laterality Date     IR CHEST PORT PLACEMENT > 5 YRS OF AGE  6/4/2021         Family History:  No family history on file.    "   Allergies:  No Known Allergies     Medications:  I have reviewed this patient's medication profile and medications from this hospitalization.   Noted scheduled meds are:  APAP 975mg q8h  Lidocaine 4% [id number] q24hr  Pantoprazole 40mg IV BID  Ciprofloxacin, Zosyn, and Metronidazole     Noted PRN meds are:  Dilaudid 0.5mg x1 yesterday, 0.3mg x1 today afternoon  Oxycodone 5mg q4h PRN x1 yesterday at ~1600    Physical Exam:  Vital Signs: Temp: (!) 101  F (38.3  C) (nurse notified) Temp src: Oral BP: 96/61 Pulse: 100   Resp: 16 SpO2: 94 % O2 Device: None (Room air)    Weight: 162 lbs 11.19 oz  Gen: Pleasant, well appearing male, laying in bed with wife at bedside  Eyes: Conjunctiva clear. Sclera anicteric. EOMI  HENT: NCAT; Moist mucous membranes   Resp: No increased work of breathing, comfortable on RA   Abd: soft, moderate epigastric tenderness. Mild tenderness in the LUQ. Positive bowel sounds.   Msk: no gross deformity  Skin:  No jaundice  Ext: warm, well perfused. Moving all 4 extremities w/o limited ROM  Neuro: A&O x 3; CN II-XII grossly intact;   Mental status/Psych: Welcoming, appropriately tearful at times; sensorium intact    Data reviewed:  Reviewed recent labs and pertinent imaging  Recent Results (from the past 24 hour(s))   Asymptomatic COVID-19 Virus (Coronavirus) by PCR Nasopharyngeal    Collection Time: 12/02/21  4:24 PM    Specimen: Nasopharyngeal; Swab   Result Value Ref Range    SARS CoV2 PCR Negative Negative, Testing sent to reference lab. Results will be returned via unsolicited result   CBC with platelets and differential    Collection Time: 12/02/21  8:15 PM   Result Value Ref Range    WBC Count 18.0 (H) 4.0 - 11.0 10e3/uL    RBC Count 2.65 (L) 4.40 - 5.90 10e6/uL    Hemoglobin 7.2 (L) 13.3 - 17.7 g/dL    Hematocrit 24.0 (L) 40.0 - 53.0 %    MCV 91 78 - 100 fL    MCH 27.2 26.5 - 33.0 pg    MCHC 30.0 (L) 31.5 - 36.5 g/dL    RDW 16.2 (H) 10.0 - 15.0 %    Platelet Count 250 150 - 450 10e3/uL    %  Neutrophils 83 %    % Lymphocytes 8 %    % Monocytes 7 %    % Eosinophils 1 %    % Basophils 0 %    % Immature Granulocytes 1 %    NRBCs per 100 WBC 0 <1 /100    Absolute Neutrophils 15.0 (H) 1.6 - 8.3 10e3/uL    Absolute Lymphocytes 1.4 0.8 - 5.3 10e3/uL    Absolute Monocytes 1.3 0.0 - 1.3 10e3/uL    Absolute Eosinophils 0.1 0.0 - 0.7 10e3/uL    Absolute Basophils 0.0 0.0 - 0.2 10e3/uL    Absolute Immature Granulocytes 0.1 <=0.4 10e3/uL    Absolute NRBCs 0.0 10e3/uL   Basic metabolic panel    Collection Time: 12/03/21  6:10 AM   Result Value Ref Range    Sodium 137 133 - 144 mmol/L    Potassium 3.8 3.4 - 5.3 mmol/L    Chloride 102 94 - 109 mmol/L    Carbon Dioxide (CO2) 30 20 - 32 mmol/L    Anion Gap 5 3 - 14 mmol/L    Urea Nitrogen 19 7 - 30 mg/dL    Creatinine 0.90 0.66 - 1.25 mg/dL    Calcium 11.6 (H) 8.5 - 10.1 mg/dL    Glucose 106 (H) 70 - 99 mg/dL    GFR Estimate >90 >60 mL/min/1.73m2   Magnesium    Collection Time: 12/03/21  6:10 AM   Result Value Ref Range    Magnesium 2.3 1.6 - 2.3 mg/dL   Procalcitonin    Collection Time: 12/03/21  6:10 AM   Result Value Ref Range    Procalcitonin 1.84 (H) <0.05 ng/mL   CBC with platelets and differential    Collection Time: 12/03/21  6:11 AM   Result Value Ref Range    WBC Count 15.8 (H) 4.0 - 11.0 10e3/uL    RBC Count 2.58 (L) 4.40 - 5.90 10e6/uL    Hemoglobin 7.1 (L) 13.3 - 17.7 g/dL    Hematocrit 23.8 (L) 40.0 - 53.0 %    MCV 92 78 - 100 fL    MCH 27.5 26.5 - 33.0 pg    MCHC 29.8 (L) 31.5 - 36.5 g/dL    RDW 15.9 (H) 10.0 - 15.0 %    Platelet Count 218 150 - 450 10e3/uL    % Neutrophils 81 %    % Lymphocytes 8 %    % Monocytes 9 %    % Eosinophils 1 %    % Basophils 0 %    % Immature Granulocytes 1 %    NRBCs per 100 WBC 0 <1 /100    Absolute Neutrophils 12.8 (H) 1.6 - 8.3 10e3/uL    Absolute Lymphocytes 1.3 0.8 - 5.3 10e3/uL    Absolute Monocytes 1.4 (H) 0.0 - 1.3 10e3/uL    Absolute Eosinophils 0.2 0.0 - 0.7 10e3/uL    Absolute Basophils 0.0 0.0 - 0.2 10e3/uL     Absolute Immature Granulocytes 0.1 <=0.4 10e3/uL    Absolute NRBCs 0.0 10e3/uL   Lactic Acid STAT    Collection Time: 12/03/21  9:26 AM   Result Value Ref Range    Lactic Acid 1.9 0.7 - 2.0 mmol/L

## 2021-12-03 NOTE — PROGRESS NOTES
SPIRITUAL HEALTH SERVICES   Church Sacrament of Communion  [facility] 7d    Pt received Communion from Father Lili Flannery   Pager [phone]

## 2021-12-03 NOTE — PLAN OF CARE
[id number]:     /54 (BP Location: Left arm)   Pulse 92   Temp 99.9  F (37.7  C) (Oral)   Resp 16   Wt 73.8 kg (162 lb 11.2 oz)   SpO2 94%   BMI 22.07 kg/m      NEURO: Intact, A&O x4.      RESPIRATORY:  WDL. No cough noted, pt denies having any SOB.     CARDIO: WDL.      GI/:  WDL. Pt voiding spontaneously with AUOP. Last BM: 12/1/21. Pt denies having any N/V.      SKIN: WDL.      ACTIVITY: stand by assist, using urinal at bedside independently.      PAIN: Pt reports right abdominal pain, declines pain medications d/t increased drowsiness after previous doses of narcotics taken this afternoon. Pt stated he is fine with waiting until his scheduled acetaminophen is due.      LDA: Port (right) - NS @ 10 ml/hr TKO. PIV (right) - saline locked.

## 2021-12-03 NOTE — PROGRESS NOTES
1530-1930: Covid test sent and it was negative. Remains on droplet precautions. Agreeable to try 5 mg Oxycodone in addition to Tylenol for right abdominal pain. He reported it didn't really help his pain but made him drowsy. Diet changed to regular. Ate 50% of pot roast and sherbet for dinner. Rocephin changed to Zosyn and 1st dose given without incident. T-max 100.1, was feeling chilled at that time.

## 2021-12-03 NOTE — PROGRESS NOTES
[facility]    Medicine Progress Note - Hospitalist Service, Gold 7       Date of Admission:  11/29/2021    Assessment & Plan    Markus Stallworth is a 61 year old male admitted on 11/29/2021. He has a pmhx of metastatic pancreatic cancer (diagnosed 5/2021) who presents with acute blood clot anemia likely 2/2 upper GI bleed      Anemia - Hgb 6.9 on admission   Melena/Hematemesis  Upper GI bleed   Reported use of ~1600 mg of ibuprofen daily  X 1 month for cancer related pain management vs CTA ordered and prelim read indicates that pancreatic mass eroding into the stomach may be etiology of bleed. Has not had recurrent episodes of hematemesis or melena since arrival to ED. last episode of emesis with blood clots was on Saturday 11/27.  GI consulted in the ED will manage as below.   - s/p EGD 11/30: 12 CM fungating/ulcerated mass eroding through stomach. Actively oozing. Hemostatic spray applied. Not a long term solution.   - Discussed with IR 12/1-12/2, he is not a good candidate for embolization   - Hgb has been slowing trending down to 7.1 with symptoms of significant fatigue returning. Will transfuse 1 unit PRBCs.  - Continue to trend CBC Q12H   - On regular diet and tolerating well.  - Continue PPI      Fevers  - Tmax 101. WBC a little lower but fevers unchanged.   - He is receiving tylenol round the clock so this is likely masking fevers.    - COVID test neg x 2  - Blood cultures 11/30 ngtd. Repeated 12/1. Procal is elevated.  - Rocephin changed to Zosyn yesterday with no significant change.   - ID consulted; favor more inflammatory response that true infection. Will switch to Cipro/Flagyl    Suspected transfusion reaction  Patient had fevers to 101 during his packed red blood cells transfusion on 11/29. Subsequently transfusion was stopped due to possible transfusion reaction. Patient notes that he felt fevers during that time alongside his chronic abdominal pain.   -  Tolerated transfusion of second unit of PRBCs on 11/30/2021   - Doubt transfusion reaction as fevers, chills have been present prior to and after transfusions.     Hx of metastatic pancreatic adenocarcinoma w/ hepatic involvement  ECOG status 1  Cancer Pain   Follows with Dr. Ra Quintanilla, last seen in clinic 11/3/21. Has been looking into research trials for treatment.  Admitting team broached subject of palliative care with patient for pain management and he was initially interested in potentially following up outpatient.  - His pain has not been well controlled partially due to reluctance to opioids. More agreeable recently.  - Also agreeable to palliative care, consult requested and case discussed.   - Will also look into pain management consult for possible nerve blockade options.     Nausea/Vomiting  Patient not complaining of active NV. Has olanzapine prescribed for this.  - Hold PTA olanzapine for now   - PRN zofran     Diet: Snacks/Supplements Adult: Ensure Enlive; With Meals  Advance Diet as Tolerated: Regular Diet Adult    DVT Prophylaxis: SCDs  Rizzo Catheter: Not present  Central Lines: None  Code Status: Full Code      Disposition Plan   Expected discharge: 12/05/2021   recommended to prior living arrangement once adequate pain management/ tolerating PO medications and hemoglobin stable.     The patient's care was discussed with the Care Coordinator/, Patient, Patient's Family and Palliative care & ID Consultant.    Blanca Pat MD  Hospitalist Service, [address]  Securely message with the Vocera Web Console (learn more here)  Text page via [employer] Paging/Directory    Please see sign in/sign out for up to date coverage information  ______________________________________________________________________    Interval History   No acute events overnight.  He reports feeling extremely fatigued this morning. He is eating, actively trying to  have good PO intake.   He feels dilaudid seemed to help with pain but he did not like the way that or oxycodone made him feel.   He is open to palliative care now.   Eventually would like to go home but he doesn't want to have to come back to hospital soon after.   Wife is at bedside. Often tearful but supportive of her .       Physical Exam   Vital Signs: Temp: 100.2  F (37.9  C) Temp src: Oral BP: 91/53 Pulse: 93   Resp: 16 SpO2: 94 % O2 Device: None (Room air)    Weight: 164 lbs 3.2 oz  General: Chronically ill appearing. In NAD.  HEENT: NCAT, EOMI  CV: Normal S1S2, regular rhythm, normal rate  Lungs: CTAB  Abdomen: Soft, NT, ND, +BS  Extremities: No LE edema b/l  Neuro: AAOx3.   Psych: Normal Affect.     Data   Recent Labs   Lab 12/03/21  0611 12/03/21  0610 12/02/21 2015 12/02/21  0637 12/01/21  1740 12/01/21  0525 11/30/21  0610 11/30/21  0549 11/29/21  1756   WBC 15.8*  --  18.0* 17.2*   < > 14.2*   < > 15.4* 15.9*   HGB 7.1*  --  7.2* 7.5*   < > 7.5*   < > 6.7* 6.0*   MCV 92  --  91 92   < > 92   < > 92 88     --  250 226   < > 222   < > 234 263   INR  --   --   --   --   --   --   --  1.26* 1.23*   NA  --  137  --  136  --  136   < >  --  136   POTASSIUM  --  3.8  --  3.9  --  4.2   < >  --  3.8   CHLORIDE  --  102  --  100  --  102   < >  --  101   CO2  --  30  --  28  --  29   < >  --  29   BUN  --  19  --  17  --  20   < >  --  30   CR  --  0.90  --  0.69  --  0.72   < >  --  0.85   ANIONGAP  --  5  --  8  --  5   < >  --  6   CHRISTINA  --  11.6*  --  11.0*  --  10.6*   < >  --  11.0*   GLC  --  106*  --  88  --  111*   < >  --  108*   ALBUMIN  --   --   --   --   --  2.0*  --   --  2.2*   PROTTOTAL  --   --   --   --   --  5.6*  --   --  5.9*   BILITOTAL  --   --   --   --   --  0.7  --   --  0.5   ALKPHOS  --   --   --   --   --  104  --   --  132   ALT  --   --   --   --   --  14  --   --  18   AST  --   --   --   --   --  19  --   --  24    < > = values in this interval not displayed.

## 2021-12-03 NOTE — PROGRESS NOTES
Pain Service Consultation Note  [facility]      Patient Name: Markus Stallworth  MRN: [id number]   Age: 61 year old  Sex: male  Date: December 3, 2021                                      Reviewed: No    Referring Provider:                                                Referring Service:  Blanca Pat MD  Reason for Consultation: interventional considerations for cancer related abdominal pain    Assessment/Recommendations:  61 year old old male presented to the hospital vomitting blood about 4days ago.  The patient has been more recently noticed worsening pain in the belly area which is expanding more towards the right epigastrium.  He himself is not bothered about the pain very much.  He is extremely worried about being discharged from the hospital with opioid medications.  And he thought that pursuing a celiac plexus neurolysis will completely take away his pain    Plan:   Much of the visit focused on educating the patient about goals and treatment of chronic cancer related pain.  I discussed with him that he is using oxycodone or other opioid medications as prescribed.  I discussed the difference between using opioid medications versus abuse.  The patient is extremely concerned about being discharged from the hospital with outpatient opiate medications.  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  This was conveyed to the palliative team and will consider safety bottles and other methods to educate the patient to keep opioid safe around the house.  I discussed with the patient that a celiac plexus neurolysis may still be an option if he is having side effects with opioids.  He continues to spike fevers of unknown etiology.  He is still awaiting another transfusion today.  Celiac plexus neurolysis can be considered for him as an outpatient basis.  I discussed with the patient in detail that the celiac neurolysis does not completely alleviate the  pain.  It does dial down the pain and may not be associated with reduction in opioid requirements.  I have discussed with the patient that it does tend to improve overall pain management and quality of life however reduction in opioids that he is hoping for may not be seen with a celiac plexus neurolysis    In the end the patient was appreciative of the time spent and all the information provided to him.  He agreed to give oxycodone a fair trial and systemic medications a chance to have his mild to moderate pain controlled    Thank you for the opportunity to participate in the care of Markus Stallworth  Pain Service will sign off.    Discussed with attending anesthesiologist  Primary Service Contacted with Recommendations? Yes    Thank you for the opportunity to participate in the care of Markus Stallworth    Contact information:  Mon - Friday 8 AM - 3 PM: [phone]  After Hours, Weekends and Holidays: [phone]    Bess Gonzalez MD  12/3/2021      Chief Complaint:  Abdominal pain      History of Present Illness:  Markus Stallworth is a 61 year old old male metastatic pancreatic cancer diagnosed in May 2021 with liver and spleen involvement admitted to the hospital 4 to 5 days ago for anemia and upper GI bleed. Our team was consulted to consider interventional approaches for pain management. I had a very interesting conversation with the patient with his wife at the bedside.    The patient stated that he felt that pain was not a major symptom for him until the last couple of weeks. Here at the hospital he is educating himself about various pain management options. He is a strong believer of mind over matter. 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 Understandably he has extreme concerns of being discharged from the hospital with opioid medications. He describes his pain as intermittent and mild to moderate in nature and  not very bothersome.    On the other hand his wife by the bedside expresses her concerns about the patient having moderate to severe pain that is frequent and happens with the frequency of every minute. She has observed him reduce his activity reduced appetite and possibly have significant sleep disturbance because of pain. She feels that the patient should receive all treatments possible to have his pain under control.    The patient describes that the pain is located in the epigastric area started on the left epigastric area with spread towards the right epigastric area.  Current pain is rated at 5/10. The patient has avoided use of any opioid medications until this hospitalization. He so far he has tried 1 dose of oxycodone at 5 mg which he feels made him loopy. He has asked the team to reduce his oxycodone 2.5 mg per dose.    The patient shared that he was extremely interested in pursuing blockade of pain to his abdomen.  The patient felt that pursuing a celiac plexus neurolysis block will replace the need of opioid medications for ongoing pain control.      Pain Assessment:   Description of Pain: Sharp  Location: Epigastric  Current Pain: 5/10  Goal: No goals  Baseline Pain Level: 0/10  Onset: Acute  Relieving/exacerbating factors: Movement     Radiating: Right epigastrium  Localized: Yes  Constant: Intermittent    Past Medical History:  No past medical history on file.      Family History:    No family history on file.      Social History:  Social History     Tobacco Use    Smoking status: Never Smoker    Smokeless tobacco: Never Used   Substance Use Topics    Alcohol use: Not on file             Review of Systems:  Complete ROS reviewed. Unless otherwise noted, all other systems found to be negative.        Laboratory Results:  Recent Labs   Lab Test 12/03/21  0611 12/03/21  0610 11/30/21  0610 11/30/21  0549 11/29/21  1756   INR  --   --   --  1.26* 1.23*     --    < > 234 263   PTT  --   --   --   --   24   BUN  --  19   < >  --  30    < > = values in this interval not displayed.             Allergies:  No Known Allergies      Current Pain Related Medications:  Medications related to Pain Management (From now, onward)      Start     Dose/Rate Route Frequency Ordered Stop    12/03/21 2200  sennosides (SENOKOT) tablet 8.6 mg         8.6 mg Oral AT BEDTIME 12/03/21 1320      12/03/21 1317  oxyCODONE IR (ROXICODONE) half-tab 2.5-5 mg         2.5-5 mg Oral EVERY 4 HOURS PRN 12/03/21 1320      12/02/21 1428  acetaminophen (TYLENOL) tablet 975 mg         975 mg Oral EVERY 8 HOURS 12/02/21 1011      12/02/21 1030  Lidocaine (LIDOCARE) 4 % Patch 2 patch         2 patch  over 12 Hours Transdermal EVERY 24 HOURS 0800 12/02/21 1011      12/02/21 1030  lidocaine patch in PLACE          Transdermal EVERY 8 HOURS 12/02/21 1011                Physical Exam:  Vitals: BP 93/53 (BP Location: Left arm)   Pulse 78   Temp 97.3  F (36.3  C) (Oral)   Resp 16   Wt 74.5 kg (164 lb 3.2 oz)   SpO2 95%   BMI 22.27 kg/m      Physical Exam:   CONSTITUTIONAL/GENERAL APPEARANCE:  NAD, sitting  EYES: EOMI, sclera anicteric, PERRLA  ENT/NECK: atraumatic, lips and oral mucous membranes dry  RESPIRATORY: non-labored breathing. No cough, wheeze  MUSCULOSKELETAL/BACK/SPINE/EXTREMITIES: Moves all extremities purposefully.  No edema or obvious joint deformities   NEURO: Alert and Oriented x3. Answers questions appropriately  SKIN/VASCULAR EXAM:  No jaundice, no visible rashes or lesions      Total time spent 60 minutes with greater than 50% in consultation, education and coordination of care.  Also discussed with Dr. Cassandra Ramsey MD and his oncologist. Discussed with family wife at bedside. We specifically discussed to give systemic opioids chance for pain control.  I also discussed that interventions will not completely replace the requirement of systemic medications. See notes above.

## 2021-12-03 NOTE — PROGRESS NOTES
SPIRITUAL HEALTH SERVICES  [facility]  Unit: 7D    Referral Source: Staff request     Spoke with pt's nurse and not pt.    Pt is requesting a visit from a .  Pt is seeking spiritual support around transitioning to possible hospice care.    Plan:  has been requested and is planning on seeing pt this afternoon.         Duane F Bauer  Chaplain Resident  Pager number: [phone]  * [facility] remains available 24/7 for emergent requests/referrals, either by having the switchboard page the on-call  or by entering an ASAP/STAT consult in Epic (this will also page the on-call ).*

## 2021-12-03 NOTE — PLAN OF CARE
[id number]    VSS on RA. Denies nausea and SOB. C/o pain, given scheduled tylenol x1 and bengay cream x1 with some relief. Trazodone given x1 for sleep. No acute events overnight. Continue with plan of care

## 2021-12-03 NOTE — CONSULTS
GENERAL ID SERVICE CONSULTATION     Patient:  Markus Stallworth   Date of birth 1960, Medical record number [id number]  Date of Visit:  12/03/2021  Date of Admission: 11/29/2021  Consult Requester:Blanca Pat MD          Assessment and Recommendations:   ASSESSMENT:  1. Fevers  2. Leukocytosis   3. H/O pancreatic adenocarcinoma with metastasis to liver, progressing, including with erosion into stomach c/b upper GI bleed, no amenable to surgical/IR intervention    DISCUSSION:   Mr. Markus Stallworth is a 61 year old male with pmhx of pancreatic adenocarcinoma with metastasis to liver (diagnosed 5/2021) who was admitted on 11/29/21 for upper GI bleed. Found to have on CTA abdomen progressing pancreatic tail mass eroding into the stomach as the likely source of hemorrhage.    Patient previously receiving chemotherapy, but has been discontinued in 10/2021 due to progression of his cancer. Presented with hematemesis, melena, and fevers for several weeks. Hemodynamics and hemoglobin stable after initial resuscitation with transfusion and temporary hemostasis on EGD. Now with ongoing leukocytosis and fevers, in the setting of progressing pancreatic cancer eroding the gastric lumen.  Blood culture sets from 12/30 and 12/1 NGTD. Procalcitonin is elevated and trending upwards, but very difficult to say this represents infection in the setting of progressing liver masses. CTA without any mention of abscess or concerns for infection, ring enhancing splenic foci previously seen on imaging in 10/21 would be expected to be much larger if this represented a splenic abscess, could consider a possible breech of mucosal barrier or progression of liver mets leading to low level translocation of GI angelique as a potential risk for infection. However, in the absence of improvement on ceftriaxone with escalation to Zosyn that would adequately cover GI angelique, infection seems unlikely. COVID-19 tesing negative on 11/29, 12/1, 12/2, with  patient being asymptomatic and his exposure being over a week ago, infection from this also seems unlikely. Fevers and leukocytosis could likely explained by his progressing malignancy or possibly due to the splenic vein and arterial clots. We recommend discontinuing Zosyn and starting PO ciprofloxacin and metronidazole and monitoring patient for worsening.    RECOMMENDATIONS:  1. Discontinue pip-tazo  2. Start ciprofloxacin (500mg PO daily) + metronidazole (500 mg PO TID)  3. Continue treating for a total 14-day antibiotic course. End date 12/13.      Thank you for this consult. ID will sign off, please do not hesitate to contact us with any questions.     Patient was discussed with Dr. Chavez.     Cassandra Wood DO  PGY-2, Internal Medicine  Pgr [id number]          Physician Attestation:     I, Galdino Chavez, saw and evaluated Markus Stallworth as part of a shared visit.  I have reviewed and discussed with the internal medicine resident their history, physical and plan.      I personally reviewed the vital signs, medications, labs, and imaging.     My key history or physical exam findings: 62 yo M with a h/o metastatic pancreatic adenocarcinoma that was admitted on 11/29/21 with upper GI bleed. Hospitalization has been c/b persistent fevers. CT on 11/29 has suggested an enlarging pancreatic tail mass eroding into the stomach. This was thought to be source of bleed. Imaging also showed multiple increasing hepatic metastases, unchanged splenomegaly, and enhancing lesion of spleen. He has been having intermittent fevers up to 101 and a stable leukocytosis (WBC up to 18). Elevated procalcitonin (1.1-1.8). He has been receiving empiric antibiotics since 11/30, initially ceftriaxone, which was switched to pip-tazo yesterday. Antibiotics have had little discernable effect on his fevers. Blood cultures drawn with fevers have been NGTD. Chest clear on imaging. UA not suggestive of urinary infection.    Key management decisions  made by me: The etiology of his recurrent fevers are unclear, and in addition to occult infection, possible contributors include tissue infarction, clot, and known malignancy with large tumor burden. These all are likely present here in association to pancreatic cancer, and the nature of his fevers are likely multifactorial. Possible infectious causes of fever would also likely be related to underlining malignancy and include occult abscess, infected mass, translocation of gut bacteria in context of compromised mucosal barrier. No conclusive evidence of abscess on imaging; would be difficult to conclusively determine the latter causes. Non-tumor related infection unlikely given present work-up. Infection seems a less likely cause of fevers given lack of response to antibiotics. While we believe the primary contributors to his persistent fevers to be non-infectious, we would recommend treating with 14-day course of GI-active oral antibiotics, which may be helpful from a therapeutic standpoint and certainly be of utility from a diagnostic and prognostic standpoint.        ID sign off the case now, given the finalized antibiotic plan outlined above.  Dr. Colon (ID fellow, [phone]) is familiar with the case and will be available over the weekend if any additional questions or concerns. Thank you for inviting us to take part in the care of this patient.     Galdino Chavez MD  [phone]     Date of Service (when I saw the patient): 12/3/21    ________________________________________________________________    Consult Question: Fevers in immunocompromised pt.  Admission Diagnosis: Melena [K92.1]  Acute GI bleeding [K92.2]  Primary malignant neoplasm of pancreas with metastasis to other site (H) [C25.9]  Symptomatic anemia [D64.9]  Hematemesis with nausea [K92.0]         History of Present Illness:     Mr. Markus Stallworth is a 61 year old male with pmhx of pancreatic adenocarcinoma with metastasis to liver (diagnosed 5/2021) who  "was admitted on 11/29/21 for hematemesis and melena.     Patient seen at bedside, history obtained from patient and EMR.     Patient has been experiencing 2-3 weeks of intermittent black \"tar like\" stools. He states that these are not like any cases of diarrhea that he has had in the past. He endorses associated upper abdominal pain and fevers. On 11/27/21 he began to develop emesis described as black and clot like, no bright red blood, he had an additional bout of it on 11/28/21. He has had 1 melenic stool since admission, but no hematemesis. His symptoms have been occurring in the setting of weight loss, weakness, and 1,200-1,600 mg ibuprofen daily for the abdominal pain. Patient also reports possible SARS CoV-2 exposure from his daughter on 11/26/21 who tested positive on 12/1/21, he does not know if she was symptomatic at the time of his contact, but he denies SOB, cough, loss of taste or smell.     In terms of his cancer history, he was followed by Dr. Ra Quintanilla at the [facility] and treated with 2 lines of chemotherapy before it was discontinued due to progression of his cancer in 10/2021 with the hopes of being enrolled in a clinical trial at [facility] which was to occur in 01/2022. Patient endorsed waking today with a sense that he was dying and that he was considering speaking with Palliative Care.     ED Course: Afebrile, hemodynamically stable. WBC 15.9 with neutrophilic predominance, Hb 6. CTA with  pancreatic tail mass eroding into the stomach as the likely source of hemorrhage, redemonstration of multiple hepatic metastases with progressing on comparison, stable splenomegaly, occluded splenic vasculature, stable rim enhancing foci in spleen.     Hospital Course: Patient developed low-grade fevers on night of admission, initially attributed to transfusion reaction. Fevers continued after stopping transfusion and started on ceftriaxone on 11/30. GI consulted and EGD with findings of " "fungating/ulcerated mass eroding stomach. GI and IR both deeming mass/bleeding not amenable to intervention. Hemoglobin and vitals have remained stable. In setting of continued fevers, leukocytosis, antibiotics switched to Zosyn on 12/2/21. Patient met with Palliative Care today with plans for restorative treatment in hopes to attend daughter's wedding 12/29/21.           Review of Systems:   CONSTITUTIONAL:  Positive for fevers or chills.  EYES: Negative for icterus.  ENT:  Negative for sore throat, loss of sense of smell or taste.  RESPIRATORY:  Negative for cough with sputum and dyspnea.  CARDIOVASCULAR:  Negative for chest pain, dyspnea.  GASTROINTESTINAL:  Positive for hematemesis, melena. Negative for nausea, vomiting, diarrhea and constipation.  GENITOURINARY:  Negative for dysuria, hematuria.  HEME:  No easy bruising  INTEGUMENT:  Negative for rash and pruritus, skin wounds.  NEURO:  Positive for urinary incontinence due to loss of sensation of urination, reports \"blanking out\" more frequently. Negative for headache.         Past Medical History:   No past medical history on file.         Past Surgical History:     Past Surgical History:   Procedure Laterality Date     IR CHEST PORT PLACEMENT > 5 YRS OF AGE  6/4/2021            Family History:   Reviewed and non-contributory.   No family history on file.         Social History:     Social History     Tobacco Use     Smoking status: Never Smoker     Smokeless tobacco: Never Used   Substance Use Topics     Alcohol use: Not on file     History   Sexual Activity     Sexual activity: Not on file            Current Medications:       acetaminophen  975 mg Oral Q8H     amylase-lipase-protease  4-6 capsule Oral TID w/meals     lidocaine  2 patch Transdermal Q24H     lidocaine   Transdermal Q8H     pantoprazole (PROTONIX) IV  40 mg Intravenous BID     piperacillin-tazobactam  3.375 g Intravenous Q6H     sodium chloride (PF)  3 mL Intracatheter Q8H            " Allergies:   No Known Allergies         Physical Exam:   Vitals were reviewed  Patient Vitals for the past 24 hrs:   BP Temp Temp src Pulse Resp SpO2   12/03/21 0852 96/61 (!) 101  F (38.3  C) Oral 100 16 94 %   12/03/21 0700 105/56 (!) 100.6  F (38.1  C) Oral 108 16 93 %   12/03/21 0444 96/57 98.2  F (36.8  C) Oral 76 16 97 %   12/03/21 0130 -- 98.4  F (36.9  C) Oral -- -- --   12/03/21 0014 103/54 99.4  F (37.4  C) Oral 95 18 94 %   12/02/21 2031 101/54 99.9  F (37.7  C) Oral 92 16 94 %   12/02/21 1617 117/61 100.1  F (37.8  C) Axillary 91 16 97 %       Physical Examination:  GENERAL:  Cachectic appearing, in bed in no acute distress, but with intermittent abdominal pain.   HEENT:  Head is normocephalic, atraumatic   EYES:  Eyes have anicteric sclerae without conjunctival injection or stigmata of endocarditis.    ENT:  Oropharynx is moist without exudates or ulcers. Tongue is midline  NECK:  Supple. No cervical lymphadenopathy  LUNGS:  Clear to auscultation bilaterally.   CARDIOVASCULAR:  Regular rate and rhythm with no murmurs, gallops or rubs.  ABDOMEN:  Tender to deep palpation in upper quadrants. Normal bowel sounds, soft.   SKIN:  No acute rashes.  Line(s) are in place without any surrounding erythema or exudate. No stigmata of endocarditis.  NEUROLOGIC:  Grossly nonfocal. Active x4 extremities         Laboratory Data:     Inflammatory Markers  No lab results found.    Hematology Studies    Recent Labs   Lab Test 12/03/21  0611 12/02/21  2015 12/02/21  0637 12/01/21  1740 12/01/21  0525 11/30/21  2351 11/30/21  1336 11/30/21  0610 09/20/21  0748 09/14/21  0836 07/19/21  0737 07/06/21  0752 06/22/21  0710 06/07/21  0649   WBC 15.8* 18.0* 17.2* 17.7* 14.2*  --   --  15.6*   < > 1.9*   < > 4.2 3.3* 5.9   ANEU  --   --   --   --   --   --   --   --   --  0.8*  --  2.7 1.9 3.9   AEOS  --   --   --   --   --   --   --   --   --  0.0  --  0.0 0.1 0.1   HGB 7.1* 7.2* 7.5* 7.8* 7.5* 7.2*   < > 6.9*   < > 10.4*   < >  11.9* 12.9* 14.1   MCV 92 91 92 92 92  --   --  90   < > 90   < > 88 87 88    250 226 257 222  --   --  245   < > 76*   < > 118* 198 147*    < > = values in this interval not displayed.       Metabolic Studies     Recent Labs   Lab Test 12/03/21  0610 12/02/21  0637 12/01/21  0525 11/30/21  1820 11/29/21  1756    136 136 134 136   POTASSIUM 3.8 3.9 4.2 4.6 3.8   CHLORIDE 102 100 102 103 101   CO2 30 28 29 29 29   BUN 19 17 20 21 30   CR 0.90 0.69 0.72 0.80 0.85   GFRESTIMATED >90 >90 >90 >90 >90       Hepatic Studies    Recent Labs   Lab Test 12/01/21  0525 11/29/21  1756 10/25/21  0735 10/04/21  0820 08/31/21  0851 08/16/21  0741   BILITOTAL 0.7 0.5 0.4 0.4 0.6 0.4   ALKPHOS 104 132 122 85 95 100   ALBUMIN 2.0* 2.2* 3.3* 3.2* 3.1* 3.1*   AST 19 24 27 21 29 27   ALT 14 18 31 28 26 33       Microbiology:  No results found for: CULT    Urine Studies    Recent Labs   Lab Test 12/01/21  0237   LEUKEST Negative       Vancomycin Levels  No lab results found.    Invalid input(s): VANCO    Hepatitis B Testing No lab results found.  Hepatitis C Testing   No results found for: HCVAB, HQTG, HCGENO, HCPCR, HQTRNA, HEPRNA  Respiratory Virus Testing    No results found for: RS, FLUAG     Imaging:    EXAMINATION: CTA ABDOMEN PELVIS WITH CONTRAST, 11/29/2021 8:10 PM     TECHNIQUE:  Helical CT images from the lung bases through the  symphysis pubis were obtained with IV contrast in the late arterial  phase and with 80 second delay. Contrast dose: iopamidol (ISOVUE-370)  solution 103 mL     COMPARISON: Chest abdomen pelvis CT 10/25/2021, 8/23/2021, 7/22/2021     HISTORY: GI bleed     FINDINGS:     Abdomen/Pelvis     Liver: Multiple heterogeneously hypoattenuating hepatic lesions  demonstrate increased size since 10/25/2021. For example, the largest  lesion in hepatic segments II/III measures 7.2 x 6.9 cm , series 7  image 84, (previously 5.7 x 5.2. Lobulated lesion in the right hepatic  dome, series 7 image 57 measures  5.6 x 5.5 cm (previously 3.4 x 3.2  cm. A few additional fluid attenuation hepatic cysts are unchanged,  for example segment V/VI, series 7 image 143.     Biliary: No radiodense gallstones. No intrahepatic or extrahepatic  biliary dilation.      Pancreas: Increased size of a large centrally hypoattenuating mass  about the pancreatic tail measuring up to 9.1 cm in greatest  dimension. The mass is eroding into the stomach with foci of air seen  within the mass. There is a thin hyperdensity at the anterior margin  of the mass, on arterial phase series 7 image 121, which does not  blossom on delayed images. Faint hyperdensity is seen in this region  on noncontrast images.     Spleen: Mild splenomegaly, up to 13.5 cm in transverse dimension.  Stable to slightly rim-enhancing foci in the inferior spleen.     Adrenals: Abutment of the left adrenal gland by the above-described  pancreatic mass.     Kidneys: No hydronephrosis. No suspicious kidney lesion.  Unchanged  simple cyst in the right kidney.     Vascular: The celiac artery, SMA, CAMMY, and both renal arteries are  patent. Accessory right renal artery is patent. 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     Nonaneurysmal abdominal aorta.  Portal veins and hepatic veins are  patent.       Bowel: Mass eroding into the stomach, described above. No dilated  bowel.  Left colonic diverticulosis without evidence for  diverticulitis. Nondilated appendix.     Peritoneum/Retroperitoneum: No significant free fluid or free air.       Lymph nodes: No enlarged lymph nodes by short axis criteria.       Pelvic organs: Unremarkable.      Lower thorax: Mild subsegmental linear atelectasis in the lung bases.  Trace pericardial effusion.     Bones/Soft Tissues: Multilevel degenerative changes of the visualized  spine.  Degenerative disc disease at L5-S1. Degenerative changes  of  the hip joints and sacroiliac joints. No acute or aggressive appearing  bone lesion.                                                                          IMPRESSION:   1. Enlarging mass about the pancreatic tail eroding into the stomach  again demonstrated, representing the likely source of hemorrhage. No  active arterial bleeding demonstrated. Likely small focus of layering  blood products at the anterior margin of the mass.  2. Redemonstration of multiple hepatic metastases, increased in size  in comparison with 10/25/2021.  3. Unchanged splenomegaly and occluded splenic artery and vein.  4. Stable to slightly increased rim enhancing foci in the inferior  spleen.

## 2021-12-04 NOTE — PROGRESS NOTES
Discharge  D: Orders for discharge and outpatient medications written.  I: Home medications and return to clinic schedule reviewed with patient. Discharge instructions and parameters for calling Health Care Provider reviewed. Patient left at 1530 accompanied by mother.   A: Patient/family verbalized understanding and was ready for discharge.   P: Patient instructed to  medications in Pharmacy. Follow up as scheduled.

## 2021-12-04 NOTE — PLAN OF CARE
"[id number]    /59 (BP Location: Left arm)   Pulse 93   Temp 97.4  F (36.3  C) (Oral)   Resp 16   Wt 75.7 kg (166 lb 14.2 oz)   SpO2 94%   BMI 22.63 kg/m      Reason for admission: Presented with acute blood clot anemia likely 2/2 upper GI bleed   Activity: SBA   Pain: Pt reporting intermittent abdominal pain throughout shift that \"comes and goes in waves\" Lidocaine patches applied x2, PRN IV Dilaudid x1, PRN Oxycodone x1.   Neuro: AxOx4. Neuros intact.   Cardiac: WDL. AF. Normotensive.   Respiratory: NLB on RA. O2 sats WDL.   GI/: WDL. Voiding spontaneously. LBM 12/2.  Diet: Regular diet, tolerating well.   Lines: R chest port intact, SL. PIV SL. Sites WDL. Port de-accessed at discharge, PIV removed.   Wounds: No noted deficits.   Labs/imaging: Reviewed. See chart.       Continue to monitor and follow POC  "

## 2021-12-04 NOTE — PROGRESS NOTES
"        Hematology / Oncology  Daily Progress Note   Date of Service: 12/04/2021  Patient: Markus Stallworth  MRN: [id number]  Admission Date: 11/29/2021  Hospital Day # 5  Cancer Diagnosis: Metastic pancreatic adenocarcinoma  Primary Outpatient Oncologist: Dr. Kemp  Current Treatment Plan: TBD, working on getting in to a phase 1 clinical trial    Recommendations:   - Continue to follow serial HGB and transfuse if HGB<7  - f/u with onc on 12/7 for post discharge f/u  - continue pain management per pain service and palliative care team  - Please get a good bowel regimen  - I will update Dr. Silverio/Dr. Quintanilla.    Assessment & Plan:   61 year old male admitted on 11/29/2021. He has a pmhx of metastatic pancreatic cancer (diagnosed 5/2021) with known hepatic metastases and splenic vasculature involvement who presents symptomatic anemia, dark stools and dark emesis with concern for upper GI bleed likely due to enlarging mass about the pancreatic tail eroding into the stomach.    # Metastic Pancreatic Adenocarcinoma to liver  Follows with Dr Kemp.  See below for further details under \"oncologic history\".  In brief, patient was diagnosed with metastatic pancreatic cancer in 6/2021. Patient has quickly progressed after FOLFIRINOX then Gemzar + Abraxane. Last dose of any treatment 10/2021.  Patient has been undergoing evaluation for phase 1 trials under [facility] (with Dr Quintanilla) and [place].  - Discussed patient with Dr Kemp upon admission. Unfortunately patient is in a difficult position given large pancreatic mass eroding into stomach causes his UGI bleed. Patient would like to continue aggressive therapy and any treatment available.   - Dr Kemp will reach out to Dr Quintanilla to discuss if patient can get into a clinical trial if possible.  - f/u planned on 12/7 with NEELAM  - We will send out message to Dr. Kemp/Dwight and he is encouraged to contact her male for any possible trials.    # Upper GI Bleed  # Fungating " Ulcerated Mass in lesser curvature of stomach  # Pancreatic Tail Mass, eroding in to the stomach  # Anemia  Patient was using roughly 1600mg of ibuprofen daily for cancer related pain. Had been having hematemesis and melena at home. Last episode 11/27. CTA Abd/Pelvis (11/29/21) shows enlarging mass about the pancreatic tail eroding into the stomach, representing the likely source of hemorrhage. No active arterial bleeding demonstrated. Likely small focus of layering blood products at the anterior margin of the mass.  - GI on board, appreciate assistance  - EGD (11/30/21) shows 12cm fungating and ulcerated mass eroding the lesser curvature of the stomach. The mass was slowly oozing and this is likely the source of melena. Hemostatic spray was succesfully applied for hemostasis. The spray will only provide temporary hemostasis. This is not a long term solution. The mass is not amenable to endoscopic treatment.   - GI and Primary team to manage  - Continue to follow serial HGB and transfuse if HGB<7    # Intermittent fever:   - ID was consulted although there is no clear source of infection he will be on ciprofloxacin (500mg PO daily) + metronidazole (500 mg PO TID) for a total 14-day antibiotic course to cover GI floral. End date 12/13.      # Cancer related pain  - continue pain control with palliative care team and if needed he has been seen by pain service regarding a celiac plexus neurolysis.  - need a good bowel regimen.    Oncologic History:  Adapted from outpatient notes  - 06/2021: Initially presented with modest left upper quadrant pain that led to an ultrasound and then further imaging demonstrating the presence of a large mass in the tail of his pancreas with associated splenic vascular involvement with splenomegaly and liver metastases.     - A biopsy of one of his liver lesions shows an adenocarcinoma consistent with pancreatic primary.   - Targeted gene fusions of ALK, ROS1, RET, NTRK1, and NTRK3, as  well as MET e14 skipping mutations were not detected in the analyzed sample.   - 6/4/21: Port Placed   - 6/7/21: Started on treatment with 5FU, irinotecan, and oxaliplatin (FOLFIRINOX)  - Received 6 cycles, last on 8/16/21.   - 8/23/21: CT CAP showed disease progression.  - 9/8/21: Started on treatment with Gemzar and Abraxane    - Due to neutropenia and thrombocytopenia, his treatment was changed from a day [1/8/15] every 28 day schedule to a day [1/15] every 28 day schedule with Neulasta.    - 10/25/21: CT CAP showed progression of liver metastasis, pancreas mass as well as a new liver lesion.         Patient was seen and plan of care was discussed with attending physician Dr. Romeo.    Thank you for the opportunity to partake in this patients plan of care. Please do not hesitate to page with questions. We will continue to follow with you.     Primary sanchez has been updated on a timely manner.     Surjit Lozada MD    ___________________________________________________________________    Subjective & Interval History:    No acute events noted overnight.  No BM for the last couple of days.  He feels his abdominal pain is getting worse with oxycodone.  He is wondering whether we have any update from his oncologist.  He is wondering whether we have heard anything from [place] yet.  He is trying to order some breakfast this morning.      Complete and Comprehensive review of systems review and negative other than noted here or in the HPI.     Physical Exam:    Blood pressure 99/63, pulse 82, temperature 98.4  F (36.9  C), temperature source Oral, resp. rate 16, weight 74.5 kg (164 lb 3.2 oz), SpO2 96 %.    Constitutional: Pleasant male sitting in bed. Awake and conversational. Non- toxic appearing. No acute distress.   Respiratory: Breathing comfortable with no increased work on room air.  GI: Abdomen is soft, non-distended, tender with light palpation. Bowel sounds present and normoactive.   Skin: Skin is clean, dry, intact.  No jaundice appreciated.   Musculoskeletal/ Extremities: No redness, warmth, or swelling of the joints appreciated.   Neurologic: Alert and oriented. Speech normal.   Neuropsychiatric: Calm, affect congruent to situation. Appropriate mood and affect. Good judgment and insight. No visual/auditory hallucinations.       Labs & Studies: I personally reviewed the following studies:  ROUTINE LABS (Last four results):  CMP  Recent Labs   Lab 12/04/21  0749 12/03/21  0610 12/02/21  0637 12/01/21  0525 11/30/21  1820 11/29/21  1756    137 136 136   < > 136   POTASSIUM 3.8 3.8 3.9 4.2   < > 3.8   CHLORIDE 102 102 100 102   < > 101   CO2 29 30 28 29   < > 29   ANIONGAP 5 5 8 5   < > 6   * 106* 88 111*   < > 108*   BUN 18 19 17 20   < > 30   CR 0.69 0.90 0.69 0.72   < > 0.85   GFRESTIMATED >90 >90 >90 >90   < > >90   CHRISTINA 11.4* 11.6* 11.0* 10.6*   < > 11.0*   MAG 2.3 2.3 2.2  --   --   --    PROTTOTAL  --   --   --  5.6*  --  5.9*   ALBUMIN  --   --   --  2.0*  --  2.2*   BILITOTAL  --   --   --  0.7  --  0.5   ALKPHOS  --   --   --  104  --  132   AST  --   --   --  19  --  24   ALT  --   --   --  14  --  18    < > = values in this interval not displayed.     CBC  Recent Labs   Lab 12/04/21  0749 12/03/21  0611 12/02/21 2015 12/02/21  0637   WBC 14.7* 15.8* 18.0* 17.2*   RBC 2.86* 2.58* 2.65* 2.68*   HGB 7.8* 7.1* 7.2* 7.5*   HCT 25.7* 23.8* 24.0* 24.7*   MCV 90 92 91 92   MCH 27.3 27.5 27.2 28.0   MCHC 30.4* 29.8* 30.0* 30.4*   RDW 15.8* 15.9* 16.2* 15.9*    218 250 226     INR  Recent Labs   Lab 11/30/21  0549 11/29/21  1756   INR 1.26* 1.23*       Medications list for reference:  Current Facility-Administered Medications   Medication     acetaminophen (TYLENOL) tablet 975 mg     amylase-lipase-protease (ZENPEP) 34852-35186 units delayed release capsule 2-4 capsule     amylase-lipase-protease (ZENPEP) 18117-26787 units delayed release capsule 4-6 capsule     ciprofloxacin (CIPRO) tablet 500 mg      Lidocaine (LIDOCARE) 4 % Patch 2 patch     lidocaine patch in PLACE     menthol (Topical Analgesic) 2.5% (BENGAY VANISHING SCENT) 2.5 % topical gel     metroNIDAZOLE (FLAGYL) tablet 250 mg     naloxone (NARCAN) injection 0.2 mg    Or     naloxone (NARCAN) injection 0.4 mg    Or     naloxone (NARCAN) injection 0.2 mg    Or     naloxone (NARCAN) injection 0.4 mg     ondansetron (ZOFRAN) injection 4 mg     oxyCODONE IR (ROXICODONE) half-tab 2.5-5 mg     pantoprazole (PROTONIX) IV push injection 40 mg     sennosides (SENOKOT) tablet 8.6 mg     sennosides (SENOKOT) tablet 8.6 mg     sodium chloride (PF) 0.9% PF flush 3 mL     traZODone (DESYREL) tablet 50 mg

## 2021-12-04 NOTE — DISCHARGE SUMMARY
[facility]  Hospitalist Discharge Summary      Date of Admission:  11/29/2021  Date of Discharge:  12/4/2021  Discharging Provider: Blanca Pat MD  Discharge Team: Hospitalist Service, Gold 7    Discharge Diagnoses   Anemia due to acute blood loss  Upper GI bleed in setting of pancreatic tumor/cancer  SIRS without infection   Hx of metastatic pancreatic adenocarcinoma w/ hepatic involvement  Cancer Pain     Follow-ups Needed After Discharge   Follow-up Appointments     Follow Up ([facility])      Follow up with primary care provider, Cem Herrera, within 7 days to   evaluate treatment change and for hospital follow- up.  The following   labs/tests are recommended: Complete blood count.      Follow up with Dr. CHRISTINA Kemp , at oncology clinic within 2-3 days of   discharge for hospital follow- up and Follow up blood work. Possible   outpatient blood transfusions.     Pain Management Clinic     Palliative Care Clinic     Appointments on [place] and/or [facility] (with [facility] or [facility]   provider or service). Call [phone] if you haven't heard regarding   these appointments within 7 days of discharge.             Unresulted Labs Ordered in the Past 30 Days of this Admission     Date and Time Order Name Status Description    12/3/2021  3:00 PM Prepare red blood cells (unit) Preliminary     12/1/2021  7:59 PM Blood Culture Arm, Right Preliminary     12/1/2021  7:59 PM Blood Culture Line, Other Preliminary     11/30/2021  8:30 PM Blood Culture Peripheral Blood Preliminary     11/30/2021  8:30 PM Blood Culture Line, Other Preliminary     11/30/2021  4:00 PM Transfusion Reaction Culture and Stain Preliminary     11/30/2021  4:00 PM Transfusion Reaction Pathology Evaluation In process     11/30/2021  8:00 AM Prepare red blood cells (unit) Preliminary     11/30/2021  7:44 AM Transfusion Reaction Culture and Stain Preliminary           Discharge Disposition   Discharged  to home  Condition at discharge: Guarded    Hospital Course   Markus Stallworth is a 61 year old male admitted on 11/29/2021. He has a pmhx of metastatic pancreatic cancer (diagnosed 5/2021) who presents with acute blood clot anemia likely 2/2 upper GI bleed      Anemia - Hgb 6.9 on admission   Melena/Hematemesis  Upper GI bleed   Reported use of ~1600 mg of ibuprofen daily  X 1 month for cancer related pain management vs CTA ordered and prelim read indicates that pancreatic mass eroding into the stomach may be etiology of bleed. Has not had recurrent episodes of hematemesis or melena since arrival to ED. last episode of emesis with blood clots was on Saturday 11/27.  GI consulted in the ED will manage as below.   - s/p EGD 11/30: 12 CM fungating/ulcerated mass eroding through stomach. Actively oozing. Hemostatic spray applied. Not a long term solution.   - Discussed with IR 12/1-12/2, he is not a good candidate for embolization   - Transfused third unit of PRBCs on 12/3.  - Given that the source of the bleed has not been resolved and that this bleed is likely to continue, this patient is likely to benefit from weekly transfusions to improve his quality of life.   - CBC ordered for Tuesday routed to his oncologist.   - Continue PPI      Fevers  - Persistent fevers despite receiving tylenol round the clock likely masking fevers.    - COVID test neg x 2 12/1 & 12/2  - Blood cultures 11/30 ngtd. Repeated 12/1. NGTD.  - Rocephin changed to Zosyn 12/2 with no significant change.   - ID consulted; favor more inflammatory response that true infection. Switched to Cipro/Flagyl to complete on 12/13     Suspected transfusion reaction ruled out.   Patient had fevers to 101 during his packed red blood cells transfusion on 11/29. Subsequently transfusion was stopped due to possible transfusion reaction. Patient notes that he felt fevers during that time alongside his chronic abdominal pain.   - Tolerated transfusion of second unit of  PRBCs on 11/30/2021   - Doubt transfusion reaction as fevers, chills have been present prior to and after transfusions.     Hx of metastatic pancreatic adenocarcinoma w/ hepatic involvement  ECOG status 1  Cancer Pain   Follows with Dr. Ra Quintanilla, last seen in clinic 11/3/21. Has been looking into research trials for treatment.  Admitting team broached subject of palliative care with patient for pain management and he was initially interested in potentially following up outpatient.  - His pain has not been well controlled partially due to reluctance to opioids. More agreeable recently. Will discharge with oxycodone as needed.  - Palliative care saw on 12/3. Outpatient referral ordered.    - Pain management also consulted. He may not be a good candidate for nerve blockade. Outpatient referral also ordered.    Consultations This Hospital Stay   ONCOLOGY ADULT IP CONSULT  GI LUMINAL ADULT IP CONSULT  VASCULAR ACCESS CARE ADULT IP CONSULT  VASCULAR ACCESS CARE ADULT IP CONSULT  INTERVENTIONAL RADIOLOGY ADULT/PEDS IP CONSULT  INFECTIOUS DISEASE GENERAL ADULT IP CONSULT  PALLIATIVE CARE ADULT IP CONSULT  PAIN MANAGEMENT ADULT IP CONSULT    Code Status   Full Code    Time Spent on this Encounter   I, Blanca Pat MD, personally saw the patient today and spent greater than 30 minutes discharging this patient.       Blanca Pat MD  [facility] [address] [address]  Phone: [phone]  ______________________________________________________________________    Physical Exam   Vital Signs: Temp: 97.4  F (36.3  C) Temp src: Oral BP: 101/59 Pulse: 93   Resp: 16 SpO2: 94 % O2 Device: None (Room air)    Weight: 166 lbs 14.21 oz  General: Chronically ill appearing. In NAD.  HEENT: NCAT, EOMI  CV: Normal S1S2, regular rhythm, normal rate  Lungs: CTAB  Abdomen: Soft, NT, ND, +BS  Extremities: No LE edema b/l  Neuro: AAOx3.   Psych: Normal Affect.        Primary Care Physician    Cem Herrera    Discharge Orders      Palliative Care Referral      Pain Management Referral      Follow Up ([facility])    Follow up with primary care provider, Cem Herrera, within 7 days to evaluate treatment change and for hospital follow- up.  The following labs/tests are recommended: Complete blood count.      Follow up with Dr. CHRISTINA Kemp , at oncology clinic within 2-3 days of discharge for hospital follow- up and Follow up blood work. Possible outpatient blood transfusions.     Pain Management Clinic     Palliative Care Clinic     Appointments on [place] and/or [facility] (with [facility] or [facility] provider or service). Call [phone] if you haven't heard regarding these appointments within 7 days of discharge.     Reason for your hospital stay    Upper GI bleed due to Pancreatic Tumor  Anemia     Activity    Your activity upon discharge: activity as tolerated     Diet    Follow this diet upon discharge: Orders Placed This Encounter      Snacks/Supplements Adult: Ensure Enlive; With Meals      Advance Diet as Tolerated: Regular Diet Adult     CBC with Platelets & Differential       Significant Results and Procedures     Discharge Medications   Current Discharge Medication List      START taking these medications    Details   ciprofloxacin (CIPRO) 500 MG tablet Take 1 tablet (500 mg) by mouth every 12 hours for 10 days  Qty: 21 tablet, Refills: 0    Associated Diagnoses: SIRS (systemic inflammatory response syndrome) (H)      HYDROmorphone (DILAUDID) 2 MG tablet Take 0.5-1 tablets (1-2 mg) by mouth every 6 hours as needed for breakthrough pain or severe pain  Qty: 15 tablet, Refills: 0    Associated Diagnoses: Primary malignant neoplasm of pancreas with metastasis to other site (H)      metroNIDAZOLE (FLAGYL) 500 MG tablet Take 1 tablet (500 mg) by mouth 3 times daily for 10 days  Qty: 32 tablet, Refills: 0    Associated Diagnoses: SIRS (systemic inflammatory response syndrome) (H)      oxyCODONE  (ROXICODONE) 5 MG tablet Take 0.5-1 tablets (2.5-5 mg) by mouth every 4 hours as needed for moderate to severe pain  Qty: 15 tablet, Refills: 0    Associated Diagnoses: Primary malignant neoplasm of pancreas with metastasis to other site (H)      pantoprazole (PROTONIX) 40 MG EC tablet Take 1 tablet (40 mg) by mouth 2 times daily  Qty: 60 tablet, Refills: 0    Associated Diagnoses: Acute GI bleeding; Melena      polyethylene glycol (MIRALAX) 17 GM/Dose powder Take 17 g by mouth daily  Qty: 510 g, Refills: 0    Associated Diagnoses: Pancreatic cancer metastasized to liver (H)      sennosides (SENOKOT) 8.6 MG tablet Take 2 tablets by mouth At Bedtime  Qty: 60 tablet, Refills: 0    Associated Diagnoses: Pancreatic cancer metastasized to liver (H)      traZODone (DESYREL) 50 MG tablet Take 1 tablet (50 mg) by mouth nightly as needed for sleep  Qty: 30 tablet, Refills: 0    Associated Diagnoses: Other insomnia         CONTINUE these medications which have NOT CHANGED    Details   OLANZapine (ZYPREXA) 2.5 MG tablet Take 1 tablet (2.5 mg) by mouth At Bedtime  Qty: 30 tablet, Refills: 3    Associated Diagnoses: Nausea and vomiting, intractability of vomiting not specified, unspecified vomiting type      ondansetron (ZOFRAN-ODT) 8 MG ODT tab Take 1 tablet (8 mg) by mouth every 8 hours as needed for nausea  Qty: 30 tablet, Refills: 3    Associated Diagnoses: Malignant neoplasm of tail of pancreas (H)      ZENPEP 42268-821743 units CPEP TAKE 2-3 CAPS WITH MEALS AND 1-2 CAPS WITH SNACKS  Qty: 360 capsule, Refills: 1    Associated Diagnoses: Malignant neoplasm of tail of pancreas (H)         STOP taking these medications       benzonatate (TESSALON) 100 MG capsule Comments:   Reason for Stopping:         diphenoxylate-atropine (LOMOTIL) 2.5-0.025 MG tablet Comments:   Reason for Stopping:         ibuprofen (ADVIL) 200 MG tablet Comments:   Reason for Stopping:         lidocaine (XYLOCAINE) 2 % solution Comments:   Reason for  Stopping:         loperamide (IMODIUM) 2 MG capsule Comments:   Reason for Stopping:         loratadine (CLARITIN) 10 MG tablet Comments:   Reason for Stopping:         methocarbamol (ROBAXIN) 750 MG tablet Comments:   Reason for Stopping:         prochlorperazine (COMPAZINE) 10 MG tablet Comments:   Reason for Stopping:         valACYclovir (VALTREX) 1000 mg tablet Comments:   Reason for Stopping:             Allergies   No Known Allergies

## 2021-12-04 NOTE — PLAN OF CARE
[id number]:  OVSS on RA, afebrile. Intermittent abdominal pain, managed with scheduled tylenol & prn oxycodone x1 overnight. Denies N/V, SOB. IV abx discontinued, started PO Flagyl & Cipro. Voiding spontaneously. LBM 12/2. Slept between cares.

## 2021-12-04 NOTE — DISCHARGE INSTRUCTIONS
- Blood work (CBC) order provided. Results will be sent to Dr. Kemp. Follow up with your PCP and Dr. Kemp about this.   - Ciprofloxacin (twice daily) & Flagyl (Three times daily), last day on 12/13/2021

## 2021-12-04 NOTE — PLAN OF CARE
"[id number]    BP 94/52 (BP Location: Left arm)   Pulse 79   Temp 97.8  F (36.6  C) (Oral)   Resp 16   Wt 74.5 kg (164 lb 3.2 oz)   SpO2 97%   BMI 22.27 kg/m      Reason for admission: Presented with acute blood clot anemia likely 2/2 upper GI bleed   Activity: SBA  Pain: Pt reporting intermittent abdominal pain throughout shift that \"comes and goes in waves\" Lidocaine patches applied x2, PRN IV Dilaudid x1, PRN Oxycodone x1.   Neuro: AxOx4. Neuros intact.   Cardiac: Tmax 101. Soft BP within parameters. Transfused 1u pRBC without reaction for Hgb 7.1 with active bleed.   Respiratory: NLB on RA. O2 sats WDL.   GI/: WDL. Voiding spontaneously. LBM 12/2.  Diet: Advanced to regular diet, tolerating well.   Lines: R chest port intact, SL. PIV SL. Sites WDL.  Wounds: No noted deficits.   Labs/imaging: Reviewed. See chart.       Continue to monitor and follow POC    "

## 2021-12-06 NOTE — PROGRESS NOTES
Prior Authorization Denial    pantoprazole 40mg tabs  Date Initiated: 12/6/2021  Date Completed: 12/6/2021  Prior Auth Type: Quantity Limit                Status: Denied    Denial Date: 12/06/2021   Denial Reasoning: Patient does not meet insurance criteria for additional quantity. Patient is limited to 30 tablets per month.   Appeal Info: Send a letter of medical necessity to [address] [address]  [place] [address]  Phone: [phone]  Fax: [phone]     Insurance: [employer] ([facility]) - Phone [phone] Fax [phone]  ID: [id number]  Case Number: [id number]   Submitted Via: Juliet Beltran  [facility] Liaison  Ph: [phone] Pager: [phone]

## 2021-12-06 NOTE — PROGRESS NOTES
RN Care Coordination Note  Post Hospital Discharge     Placed call to patient to follow-up after recent hospital discharge. Patient states he is feeling much better. He has been using dilaudid and oxycodone for pain control, he feels this has been very helpful. He currently has no oxycodone and only 1-2 dilaudid remaining. Reviewed with Dr Kemp who is able to refill. Rx sent to Dr Kemp for signing.     Patient also reports he had blood in stool today. Dr Kemp feels this is likely to continue with tumor evasion. Can support with transfusions as needed.     Reviewed upcoming appointments with patient. He voiced understanding and has no further questions at this time.        Mitali Will RN, BSN, OCN   RN Care Coordinator   [facility]

## 2021-12-07 PROBLEM — E83.52 HYPERCALCEMIA: Status: ACTIVE | Noted: 2021-01-01

## 2021-12-07 NOTE — PROGRESS NOTES
Infusion Nursing Note:  Markus Stallworth presents today for IVF.    Patient seen by provider today: No   present during visit today: Not Applicable.      Intravenous Access:  Implanted Port.    Treatment Conditions:  Lab Results   Component Value Date    HGB 7.8 (L) 12/04/2021    WBC 14.7 (H) 12/04/2021    ANEU 0.8 (L) 09/14/2021    ANEUTAUTO 12.0 (H) 12/04/2021     12/04/2021      Lab Results   Component Value Date     12/04/2021    POTASSIUM 3.8 12/04/2021    MAG 2.3 12/04/2021    CR 0.69 12/04/2021    CHRISTINA 11.4 (H) 12/04/2021    BILITOTAL 0.7 12/01/2021    ALBUMIN 2.0 (L) 12/01/2021    ALT 14 12/01/2021    AST 19 12/01/2021     Urine also sent.      Post Infusion Assessment:  Patient tolerated infusion without incident.    Blood return noted pre and post infusion. Good easy blood return at deacess of port needle.    Site patent and intact, free from redness, edema or discomfort.  No evidence of extravasations.  Access discontinued per protocol.       Discharge Plan:   Patient discharged in stable condition accompanied by: wife and son.  Departure Mode: Wheelchair.  Markus will follow up as ordered by Merle Freeman Np today. It had not been scheduled by discharged time today.      Bouchra Farmer RN

## 2021-12-07 NOTE — LETTER
12/7/2021         RE: Markus Stallworth  [id number]  [facility] [address]      Oncology/Hematology Visit Note  Dec 7, 2021    Reason for Visit: seen in follow up for metastatic pancreatic cancer    Oncology HPI:  Markus Stallworth is a 61 year old male with metastatic pancreatic cancer. He initially presented with modest left upper quadrant pain that led to an ultrasound and then further imaging demonstrating the presence of a large mass in the tail of his pancreas with associated splenic vascular involvement with splenomegaly and liver metastases.  A biopsy of one of his liver lesions shows an adenocarcinoma consistent with pancreatic primary. Targeted gene fusions of ALK, ROS1, RET, NTRK1, and NTRK3, as well as MET e14 skipping mutations were not detected in the analyzed sample. His port was placed on 6/4/21. He started on treatment with 5FU, irinotecan, and oxaliplatin (FOLFIRINOX) on 6/7/21. He received 6 cycles, last on 8/16/21. CT CAP on 8/23/21 showed disease progression. He then started on treatment with Gemzar and Abraxane on 9/8/21. Due to neutropenia and thrombocytopenia, his treatment was changed from a day 1/8/15 every 28 day schedule to a day 1/15 every 28 day schedule with Neulasta.     Pt has been off treatment since 10/2021. He was admitted on 11/29/2021 with symptomatic anemia, dark stools and dark emesis with upper GI bleed due to enlarging mass above the pancreatic tail eroding into the stomach. He had been undergoing evaluation for phase 1 trials here as well as at [place] however he has had a significant decline over the past 3-4 weeks and now with poor performance status as well as increasing pain and weakness. He was discharged home on 12/4/2021 with plans to trend hgb and transfuse as needed.     Interval history:   Markus has had ups and downs over last week  Feeling weak- wants to request routine blood transfusions as this help immensely during recent hospital stay  Bleeding- has drew taking a lot  of miralax/ senna and has had frequent BMs, stools have been pretty dark- no bright red blood. The last BM was completely normal in color  No blood in vomiting  Has vomited a few times- bilious and food  Appetite has been poor, does drink Ensure well. Eats a few bites and then is done. Feels this is an appetite issue, not necessarily pain although some pain  1 ensure/ day currently- wife wants them to increase to 3/ day. No heartburn  Pain is well managed right at this moment, hiccups trigger the pain. Never fully gone, Pain worse on left side below rib cage, from sternum down to the left (was on the right more so in the hospital)- taking tylenol q6, dilaudid 2 mg every 6 hours. Takes a dose at bedtime and then again in the morning. Pain never fully goes away. Has a new Rx for oxycodone he hasn't started yet  Has chills occ, no fevers  No cough or congestion or sore throat  He became incontinent of urine during the end of recent hospitalization- no dysuria   Has had several falls since getting home- 2-3x, once slipping on the carpet on the stairs, once or twice in BR recently. No dizziness, just loses balance.    Current Outpatient Medications   Medication Sig Dispense Refill     ciprofloxacin (CIPRO) 500 MG tablet Take 1 tablet (500 mg) by mouth every 12 hours for 10 days 21 tablet 0     HYDROmorphone (DILAUDID) 2 MG tablet Take 0.5-1 tablets (1-2 mg) by mouth every 6 hours as needed for breakthrough pain or severe pain 56 tablet 0     metroNIDAZOLE (FLAGYL) 500 MG tablet Take 1 tablet (500 mg) by mouth 3 times daily for 10 days 32 tablet 0     OLANZapine (ZYPREXA) 2.5 MG tablet Take 1 tablet (2.5 mg) by mouth At Bedtime 30 tablet 3     ondansetron (ZOFRAN-ODT) 8 MG ODT tab Take 1 tablet (8 mg) by mouth every 8 hours as needed for nausea 30 tablet 3     oxyCODONE (ROXICODONE) 5 MG tablet Take 0.5-1 tablets (2.5-5 mg) by mouth every 4 hours as needed for moderate to severe pain 84 tablet 0     pantoprazole  (PROTONIX) 40 MG EC tablet Take 1 tablet (40 mg) by mouth 2 times daily 60 tablet 0     polyethylene glycol (MIRALAX) 17 GM/Dose powder Take 17 g by mouth daily 510 g 0     sennosides (SENOKOT) 8.6 MG tablet Take 2 tablets by mouth At Bedtime 60 tablet 0     traZODone (DESYREL) 50 MG tablet Take 1 tablet (50 mg) by mouth nightly as needed for sleep 30 tablet 0     ZENPEP 27531-868688 units CPEP TAKE 2-3 CAPS WITH MEALS AND 1-2 CAPS WITH SNACKS 360 capsule 1      No Known Allergies    Video physical exam  General: Patient appears tired and fatigued, he is lying down, in no acute distress.   Skin: No visualized rash or lesions on visualized skin  Eyes: EOMI, no erythema, sclera icterus or discharge noted  Resp: Appears to be breathing comfortably without accessory muscle usage, speaking in full sentences, no cough  MSK: Appears to have normal range of motion based on visualized movements  Neurologic: No apparent tremors, facial movements symmetric  Psych: affect pleasant, alert and oriented however lethargic    The rest of a comprehensive physical examination is deferred due to PHE (public health emergency) video restrictions    Labs:    12/7/2021 14:59   Sodium 135   Potassium 3.7   Chloride 99   Carbon Dioxide 30   Urea Nitrogen 23   Creatinine 0.79   GFR Estimate >90   Calcium 12.7 (H)   Anion Gap 6   Albumin 2.0 (L)   Protein Total 5.9 (L)   Bilirubin Total 0.7   Alkaline Phosphatase 277 (H)   ALT 26   AST 37   Glucose 118 (H)   WBC 20.6 (H)   Hemoglobin 8.6 (L)   Hematocrit 28.6 (L)   Platelet Count 283   RBC Count 3.15 (L)   MCV 91   MCH 27.3   MCHC 30.1 (L)   RDW 16.2 (H)   % Neutrophils 84   % Lymphocytes 8   % Monocytes 7   % Eosinophils 0   % Basophils 0   Absolute Basophils 0.1   Absolute Eosinophils 0.1   Absolute Immature Granulocytes 0.2   Absolute Lymphocytes 1.6   Absolute Monocytes 1.5 (H)   % Immature Granulocytes 1   Absolute Neutrophils 17.2 (H)   Absolute NRBCs 0.0   NRBCs per 100 WBC 0   Color  Urine Yellow   Appearance Urine Clear   Glucose Urine Negative   Bilirubin Urine Negative   Ketones Urine Negative   Specific Gravity Urine 1.017   pH Urine 5.5   Protein Albumin Urine Negative   Urobilinogen mg/dL Normal   Nitrite Urine Negative   Blood Urine Negative   Leukocyte Esterase Urine Negative   WBC Urine 0-5   RBC Urine 0-2   Mucus Urine Present (A)   Amorphous Crystals Few (A)   Labs reviewed and interpreted by me.     Impression/plan:   # Metastatic pancreatic cancer  Patient was diagnosed with metastatic pancreatic cancer in 6/2021. Patient has quickly progressed after FOLFIRINOX then Gemzar + Abraxane. Last dose of any treatment 10/2021.  - Patient had been undergoing evaluation for phase 1 trials under [facility] (with Dr Quintanilla) and [place] however due to poor performance status (ECOG 3 currently) he is unlikely to qualify  - Discussed this with Markus and his family today, they understand his anemia, weakness etc are large barriers to further treatment  - Continue [facility] conversations, pt appears to have tremendous family support  - I will see him back next week in person.     # GIB 2/2 gastric erosion  # Anemia- acute blood loss  Tumor burden has eroded into stomach wall causing GIB, pt has been having both hematemesis and melena. Recent admission due to this. Hgb now stable OP however at high risk for re bleed due to tumor status as well as continuing to take PO.   - Trend CBC twice weekly for now with potential transfusions    # Fatigue/ weakness  Following recent hospital stay, pt has had a dramatic decline in functional status since about 2 weeks prior to thanksgiving holiday. Recent GIB with severe anemia likely a large component however disease status and general deconditioning have progressed.   - Previously had declined cancer rehab  - Pt is recovering from recent hospital stay, if anemia improves could re visit this     # Pain 2/2 tumor burden  Pt has tumor erosion of his stomach, is having  pain from sternum down and to left upper abdomen, occ to the right side. This is new pain within the last month.   - Saw pall care in hospital on 12/2  - Currently has dilaudid and picked up new Rx for oxycodone as well- encouraged pt to chose one or the other, and educated that we can adjust dosing if needed however typically prefer to use one short acting agent  - May need to consider long acting pain medicine as this pain has progressed quite quickly over past 4 weeks or so  - continue pall care- next visit 12/8    # Nutrition status  Is still taking some PO, is able to tolerate 1 ensure/ daily and is hoping to increase to 3 daily. Eating small amounts of food, has really been struggling with intake.   - Encourage PO intake as able, protein shakes are great option  - May consider appetite stimulant however with recent GIB unsure this is a safe route to take    # Hypercalcemia  Likely in s/o malignancy, has been elevated since 11/29. Recheck today 12/7  - IVF and zometa in infusion today  - Will trend twice weekly additionally     # Urinary incontience  New issue since hospital stay- ddx include pain meds vs infection vs other. Another component seems to be weakness and his mobility in getting to the bathroom.   - UA appears clean, UCx pending  - Pt's wife has bought depends     # Insomnia  - Has been taking trazedone, educated he can double his dose to 100 mg nightly if interested     # Nausea  Had been secondary to chemotherapy, however has been off treatment for ? 30 days.   - Does not appear to be active issue     # Peripheral neuropathy. Stable. Discussed that he may try acupuncture and okay to take a vitamin B complex supplement.      # Pancreatic insufficiency. Patient will continue on pancreatic enzymes. He has met with our dietician this week.     Yodit Mni [facility]-BC    60 minutes spent on the date of the encounter doing chart review, review of test results, interpretation of tests, patient visit,  documentation and discussion with family     The patient was seen in conjunction with Yodit Min [facility]-BC who served as a scribe for today's visit. I have reviewed and edited the above note, and agree with the above findings and plan.        Merle Freeman PA-C

## 2021-12-07 NOTE — PROGRESS NOTES
RECORDS STATUS - ALL OTHER DIAGNOSIS      RECORDS RECEIVED FROM: Psychiatric   DATE RECEIVED: 12/8/2021   NOTES STATUS DETAILS   OFFICE NOTE from referring provider     OFFICE NOTE from medical oncologist Complete 11/29/2021 Symptomatic anemia     11/12/2021 Malignant Neoplasm Of Pancreas Adenocarcinoma (HCC)     More in EPIC   DISCHARGE SUMMARY from hospital Complete 11/29/2021 Symptomatic anemia     More in Psychiatric   DISCHARGE REPORT from the ER Complete 11/29/2021 Symptomatic anemia    OPERATIVE REPORT Complete 11/29/2021 Upper GI Endoscopy    MEDICATION LIST Complete Psychiatric   CLINICAL TRIAL TREATMENTS TO DATE     LABS     PATHOLOGY REPORTS Complete 6/3/2021 Pancreatic Cancer Biopsy Consult in EPIC   ANYTHING RELATED TO DIAGNOSIS Complete Labs last updated on 12/4/2021 6/29/2021   Next Generation Sequencing Oncology-COLORECTAL   GENONOMIC TESTING     TYPE:     IMAGING (NEED IMAGES & REPORT)     CT SCANS Complete CTA Abdomen Pelvis 11/29/2021    CT Chest abdomen 10/25/2021, 8/23/2021 more in PACS   MRI     MAMMO     Xray Chest Complete 11/30/2021   ULTRASOUND     PET

## 2021-12-07 NOTE — PROGRESS NOTES
Markus is a 61 year old who is being evaluated via a billable video visit.      How would you like to obtain your AVS? [email or web address]  If the video visit is dropped, the invitation should be resent by: Text to cell phone: [phone]  Will anyone else be joining your video visit? No      Video-Visit Details    Type of service:  Video Visit    Video Start Time: 1215    Video End Time:1310    Originating Location (pt. Location): Home    Distant Location (provider location):  [facility]     Platform used for Video Visit: [facility]    Oncology/Hematology Visit Note  Dec 7, 2021    Reason for Visit: seen in follow up for metastatic pancreatic cancer    Oncology HPI:  Markus Stallworth is a 61 year old male with metastatic pancreatic cancer. He initially presented with modest left upper quadrant pain that led to an ultrasound and then further imaging demonstrating the presence of a large mass in the tail of his pancreas with associated splenic vascular involvement with splenomegaly and liver metastases.  A biopsy of one of his liver lesions shows an adenocarcinoma consistent with pancreatic primary. Targeted gene fusions of ALK, ROS1, RET, NTRK1, and NTRK3, as well as MET e14 skipping mutations were not detected in the analyzed sample. His port was placed on 6/4/21. He started on treatment with 5FU, irinotecan, and oxaliplatin (FOLFIRINOX) on 6/7/21. He received 6 cycles, last on 8/16/21. CT CAP on 8/23/21 showed disease progression. He then started on treatment with Gemzar and Abraxane on 9/8/21. Due to neutropenia and thrombocytopenia, his treatment was changed from a day 1/8/15 every 28 day schedule to a day 1/15 every 28 day schedule with Neulasta.     Pt has been off treatment since 10/2021. He was admitted on 11/29/2021 with symptomatic anemia, dark stools and dark emesis with upper GI bleed due to enlarging mass above the pancreatic tail eroding into the stomach. He had been undergoing evaluation for  phase 1 trials here as well as at [place] however he has had a significant decline over the past 3-4 weeks and now with poor performance status as well as increasing pain and weakness. He was discharged home on 12/4/2021 with plans to trend hgb and transfuse as needed.     Interval history:   Markus has had ups and downs over last week  Feeling weak- wants to request routine blood transfusions as this help immensely during recent hospital stay  Bleeding- has drew taking a lot of miralax/ senna and has had frequent BMs, stools have been pretty dark- no bright red blood. The last BM was completely normal in color  No blood in vomiting  Has vomited a few times- bilious and food  Appetite has been poor, does drink Ensure well. Eats a few bites and then is done. Feels this is an appetite issue, not necessarily pain although some pain  1 ensure/ day currently- wife wants them to increase to 3/ day. No heartburn  Pain is well managed right at this moment, hiccups trigger the pain. Never fully gone, Pain worse on left side below rib cage, from sternum down to the left (was on the right more so in the hospital)- taking tylenol q6, dilaudid 2 mg every 6 hours. Takes a dose at bedtime and then again in the morning. Pain never fully goes away. Has a new Rx for oxycodone he hasn't started yet  Has chills occ, no fevers  No cough or congestion or sore throat  He became incontinent of urine during the end of recent hospitalization- no dysuria   Has had several falls since getting home- 2-3x, once slipping on the carpet on the stairs, once or twice in BR recently. No dizziness, just loses balance.    Current Outpatient Medications   Medication Sig Dispense Refill     ciprofloxacin (CIPRO) 500 MG tablet Take 1 tablet (500 mg) by mouth every 12 hours for 10 days 21 tablet 0     HYDROmorphone (DILAUDID) 2 MG tablet Take 0.5-1 tablets (1-2 mg) by mouth every 6 hours as needed for breakthrough pain or severe pain 56 tablet 0     metroNIDAZOLE  (FLAGYL) 500 MG tablet Take 1 tablet (500 mg) by mouth 3 times daily for 10 days 32 tablet 0     OLANZapine (ZYPREXA) 2.5 MG tablet Take 1 tablet (2.5 mg) by mouth At Bedtime 30 tablet 3     ondansetron (ZOFRAN-ODT) 8 MG ODT tab Take 1 tablet (8 mg) by mouth every 8 hours as needed for nausea 30 tablet 3     oxyCODONE (ROXICODONE) 5 MG tablet Take 0.5-1 tablets (2.5-5 mg) by mouth every 4 hours as needed for moderate to severe pain 84 tablet 0     pantoprazole (PROTONIX) 40 MG EC tablet Take 1 tablet (40 mg) by mouth 2 times daily 60 tablet 0     polyethylene glycol (MIRALAX) 17 GM/Dose powder Take 17 g by mouth daily 510 g 0     sennosides (SENOKOT) 8.6 MG tablet Take 2 tablets by mouth At Bedtime 60 tablet 0     traZODone (DESYREL) 50 MG tablet Take 1 tablet (50 mg) by mouth nightly as needed for sleep 30 tablet 0     ZENPEP 27945-213275 units CPEP TAKE 2-3 CAPS WITH MEALS AND 1-2 CAPS WITH SNACKS 360 capsule 1      No Known Allergies    Video physical exam  General: Patient appears tired and fatigued, he is lying down, in no acute distress.   Skin: No visualized rash or lesions on visualized skin  Eyes: EOMI, no erythema, sclera icterus or discharge noted  Resp: Appears to be breathing comfortably without accessory muscle usage, speaking in full sentences, no cough  MSK: Appears to have normal range of motion based on visualized movements  Neurologic: No apparent tremors, facial movements symmetric  Psych: affect pleasant, alert and oriented however lethargic    The rest of a comprehensive physical examination is deferred due to PHE (public health emergency) video restrictions    Labs:    12/7/2021 14:59   Sodium 135   Potassium 3.7   Chloride 99   Carbon Dioxide 30   Urea Nitrogen 23   Creatinine 0.79   GFR Estimate >90   Calcium 12.7 (H)   Anion Gap 6   Albumin 2.0 (L)   Protein Total 5.9 (L)   Bilirubin Total 0.7   Alkaline Phosphatase 277 (H)   ALT 26   AST 37   Glucose 118 (H)   WBC 20.6 (H)   Hemoglobin 8.6  (L)   Hematocrit 28.6 (L)   Platelet Count 283   RBC Count 3.15 (L)   MCV 91   MCH 27.3   MCHC 30.1 (L)   RDW 16.2 (H)   % Neutrophils 84   % Lymphocytes 8   % Monocytes 7   % Eosinophils 0   % Basophils 0   Absolute Basophils 0.1   Absolute Eosinophils 0.1   Absolute Immature Granulocytes 0.2   Absolute Lymphocytes 1.6   Absolute Monocytes 1.5 (H)   % Immature Granulocytes 1   Absolute Neutrophils 17.2 (H)   Absolute NRBCs 0.0   NRBCs per 100 WBC 0   Color Urine Yellow   Appearance Urine Clear   Glucose Urine Negative   Bilirubin Urine Negative   Ketones Urine Negative   Specific Gravity Urine 1.017   pH Urine 5.5   Protein Albumin Urine Negative   Urobilinogen mg/dL Normal   Nitrite Urine Negative   Blood Urine Negative   Leukocyte Esterase Urine Negative   WBC Urine 0-5   RBC Urine 0-2   Mucus Urine Present (A)   Amorphous Crystals Few (A)   Labs reviewed and interpreted by me.     Impression/plan:   # Metastatic pancreatic cancer  Patient was diagnosed with metastatic pancreatic cancer in 6/2021. Patient has quickly progressed after FOLFIRINOX then Gemzar + Abraxane. Last dose of any treatment 10/2021.  - Patient had been undergoing evaluation for phase 1 trials under [facility] (with Dr Quintanilla) and [place] however due to poor performance status (ECOG 3 currently) he is unlikely to qualify  - Discussed this with Markus and his family today, they understand his anemia, weakness etc are large barriers to further treatment  - Continue [facility] conversations, pt appears to have tremendous family support  - I will see him back next week in person.     # GIB 2/2 gastric erosion  # Anemia- acute blood loss  Tumor burden has eroded into stomach wall causing GIB, pt has been having both hematemesis and melena. Recent admission due to this. Hgb now stable OP however at high risk for re bleed due to tumor status as well as continuing to take PO.   - Trend CBC twice weekly for now with potential transfusions    # Fatigue/  weakness  Following recent hospital stay, pt has had a dramatic decline in functional status since about 2 weeks prior to thanksgiving holiday. Recent GIB with severe anemia likely a large component however disease status and general deconditioning have progressed.   - Previously had declined cancer rehab  - Pt is recovering from recent hospital stay, if anemia improves could re visit this     # Pain 2/2 tumor burden  Pt has tumor erosion of his stomach, is having pain from sternum down and to left upper abdomen, occ to the right side. This is new pain within the last month.   - Saw pall care in hospital on 12/2  - Currently has dilaudid and picked up new Rx for oxycodone as well- encouraged pt to chose one or the other, and educated that we can adjust dosing if needed however typically prefer to use one short acting agent  - May need to consider long acting pain medicine as this pain has progressed quite quickly over past 4 weeks or so  - continue pall care- next visit 12/8    # Nutrition status  Is still taking some PO, is able to tolerate 1 ensure/ daily and is hoping to increase to 3 daily. Eating small amounts of food, has really been struggling with intake.   - Encourage PO intake as able, protein shakes are great option  - May consider appetite stimulant however with recent GIB unsure this is a safe route to take    # Hypercalcemia  Likely in s/o malignancy, has been elevated since 11/29. Recheck today 12/7  - IVF and zometa in infusion today  - Will trend twice weekly additionally     # Urinary incontience  New issue since hospital stay- ddx include pain meds vs infection vs other. Another component seems to be weakness and his mobility in getting to the bathroom.   - UA appears clean, UCx pending  - Pt's wife has bought depends     # Insomnia  - Has been taking trazedone, educated he can double his dose to 100 mg nightly if interested     # Nausea  Had been secondary to chemotherapy, however has been off  treatment for ? 30 days.   - Does not appear to be active issue     # Peripheral neuropathy. Stable. Discussed that he may try acupuncture and okay to take a vitamin B complex supplement.      # Pancreatic insufficiency. Patient will continue on pancreatic enzymes. He has met with our dietician this week.     Yodit DESAI    60 minutes spent on the date of the encounter doing chart review, review of test results, interpretation of tests, patient visit, documentation and discussion with family     The patient was seen in conjunction with Yodit DESAI who served as a scribe for today's visit. I have reviewed and edited the above note, and agree with the above findings and plan.  Merle Freeman PA-C

## 2021-12-08 NOTE — PROGRESS NOTES
"RN Care Coordination Note   Placed call to patient to check on how he is feeling. He reports \"today is a great day.\" States he continues to remain in bed most of time. However, he is feeling more at peace with health status. Reports he had a great visit with palliative care. He and his family are ready to transition into hospice. Patient and wife states they feel they have adequate support at this time and do not need anything from oncology team at this time.        Mitali Will RN, BSN, OCN   RN Care Coordinator   [facility]          "

## 2022-01-24 NOTE — PROGRESS NOTES
RN Care Coordination Note  Placed call to patient to review we have not yet been able to confirm insurance approval for chemo tomorrow. Per Dr Kemp, he does not wish to delay treatment. Patient states he has received a letter in mail stating treatment was deemed to be medically necessary. He will upload letter into [employer]. He wishes to move forward with treatment tomorrow.        Mitali Will RN, BSN, OCN   RN Care Coordinator   [facility]          
7

## 2022-03-16 ENCOUNTER — PATIENT OUTREACH (OUTPATIENT)
Dept: ONCOLOGY | Facility: CLINIC | Age: 62
End: 2022-03-16
Payer: COMMERCIAL

## 2023-04-03 PROBLEM — C78.7 MALIGNANT NEOPLASM METASTATIC TO LIVER (H): Status: ACTIVE | Noted: 2021-01-01

## (undated) RX ORDER — FENTANYL CITRATE 50 UG/ML
INJECTION, SOLUTION INTRAMUSCULAR; INTRAVENOUS
Status: DISPENSED
Start: 2021-01-01